# Patient Record
Sex: MALE | Race: WHITE | NOT HISPANIC OR LATINO | Employment: FULL TIME | URBAN - METROPOLITAN AREA
[De-identification: names, ages, dates, MRNs, and addresses within clinical notes are randomized per-mention and may not be internally consistent; named-entity substitution may affect disease eponyms.]

---

## 2017-01-09 RX ORDER — CELECOXIB 200 MG/1
CAPSULE ORAL
Qty: 30 CAPSULE | Refills: 3 | Status: SHIPPED | OUTPATIENT
Start: 2017-01-09 | End: 2017-05-22 | Stop reason: SDUPTHER

## 2017-01-23 RX ORDER — OMEPRAZOLE 20 MG/1
CAPSULE, DELAYED RELEASE ORAL
Qty: 30 CAPSULE | Refills: 5 | Status: SHIPPED | OUTPATIENT
Start: 2017-01-23 | End: 2017-07-23 | Stop reason: SDUPTHER

## 2017-02-15 ENCOUNTER — PATIENT MESSAGE (OUTPATIENT)
Dept: FAMILY MEDICINE | Facility: CLINIC | Age: 42
End: 2017-02-15

## 2017-02-16 ENCOUNTER — PATIENT MESSAGE (OUTPATIENT)
Dept: FAMILY MEDICINE | Facility: CLINIC | Age: 42
End: 2017-02-16

## 2017-02-16 RX ORDER — MUPIROCIN 20 MG/G
OINTMENT TOPICAL 3 TIMES DAILY
Qty: 39 G | Refills: 2 | Status: SHIPPED | OUTPATIENT
Start: 2017-02-16 | End: 2017-02-26

## 2017-02-22 RX ORDER — LOSARTAN POTASSIUM 100 MG/1
TABLET ORAL
Qty: 30 TABLET | Refills: 2 | Status: SHIPPED | OUTPATIENT
Start: 2017-02-22 | End: 2017-05-22 | Stop reason: SDUPTHER

## 2017-05-23 RX ORDER — CELECOXIB 200 MG/1
CAPSULE ORAL
Qty: 30 CAPSULE | Refills: 3 | Status: SHIPPED | OUTPATIENT
Start: 2017-05-23 | End: 2017-09-10 | Stop reason: SDUPTHER

## 2017-05-23 RX ORDER — LOSARTAN POTASSIUM 100 MG/1
TABLET ORAL
Qty: 30 TABLET | Refills: 2 | Status: SHIPPED | OUTPATIENT
Start: 2017-05-23 | End: 2017-08-30 | Stop reason: SDUPTHER

## 2017-07-23 RX ORDER — OMEPRAZOLE 20 MG/1
CAPSULE, DELAYED RELEASE ORAL
Qty: 30 CAPSULE | Refills: 5 | Status: SHIPPED | OUTPATIENT
Start: 2017-07-23 | End: 2017-10-02 | Stop reason: SDUPTHER

## 2017-08-30 RX ORDER — LOSARTAN POTASSIUM 100 MG/1
TABLET ORAL
Qty: 30 TABLET | Refills: 2 | Status: SHIPPED | OUTPATIENT
Start: 2017-08-30 | End: 2017-11-29 | Stop reason: SDUPTHER

## 2017-09-10 RX ORDER — CELECOXIB 200 MG/1
CAPSULE ORAL
Qty: 30 CAPSULE | Refills: 3 | Status: SHIPPED | OUTPATIENT
Start: 2017-09-10 | End: 2017-10-02 | Stop reason: SDUPTHER

## 2017-09-30 DIAGNOSIS — M51.9 LUMBAR DISC DISEASE: Primary | ICD-10-CM

## 2017-10-02 RX ORDER — MUPIROCIN 20 MG/G
OINTMENT TOPICAL
Qty: 44 G | Refills: 1 | Status: SHIPPED | OUTPATIENT
Start: 2017-10-02 | End: 2019-06-13 | Stop reason: SDUPTHER

## 2017-10-02 RX ORDER — CELECOXIB 200 MG/1
CAPSULE ORAL
Qty: 30 CAPSULE | Refills: 3 | Status: SHIPPED | OUTPATIENT
Start: 2017-10-02 | End: 2017-12-14 | Stop reason: SDUPTHER

## 2017-10-02 NOTE — PROGRESS NOTES
Refill Authorization Note     is requesting a refill authorization.    Brief assessment and rational for refill: DENY; recently sent in / needs fu  Name of medication: CELECOXIB 200 MG CAPSULE / mupirocin ointment   How patient will take medication: t1t po BID / apply to affected area TID  Amount/Quantity of medication ordered: 30d           Refills Authorized: No  If authorized number of refills: 0  If refills not authorized provide recommendations: Needs f/u  Medication-related problems identified: Therapeutic duplication  Medication Therapy Plan: DENY Celecoxib recently sent in for 90d on 9/10.  Will order CMP for updated labs.  Needs to be scheduled. DENY mupirocin: pt reported past use for heat rash in past.  Should f/u if pt still needs relief  Comments: Dc'd duplicate meds    Lab Results   Component Value Date    CREATININE 1.1 11/06/2013    BUN 9 11/06/2013     11/06/2013    K 3.9 11/06/2013     11/06/2013    CO2 28 11/06/2013      BP Readings from Last 3 Encounters:   09/30/16 138/88   02/16/16 114/70   04/14/15 (!) 142/82

## 2017-10-02 NOTE — PROGRESS NOTES
Refill Authorization Note     is requesting a refill authorization.    Brief assessment and rational for refill: APPROVE: prr  Name of medication: omeprazole (PRILOSEC) 20 MG capsule  How patient will take medication: t1t po daily   Amount/Quantity of medication ordered: 90d            Refills Authorized: Yes  If authorized number of refills: 0        Medication Therapy Plan: Last commented as stable on 9/16.  Consider weaning conversation with pt.  Approve for 90d   Comments:   Lab Results   Component Value Date    WBC 12.55 11/06/2013    HGB 15.3 11/06/2013    HCT 45.2 11/06/2013    MCV 92 11/06/2013     (H) 11/06/2013

## 2017-10-03 RX ORDER — OMEPRAZOLE 20 MG/1
20 CAPSULE, DELAYED RELEASE ORAL DAILY
Qty: 90 CAPSULE | Refills: 0 | Status: SHIPPED | OUTPATIENT
Start: 2017-10-03 | End: 2018-01-22 | Stop reason: SDUPTHER

## 2017-10-12 ENCOUNTER — PATIENT MESSAGE (OUTPATIENT)
Dept: FAMILY MEDICINE | Facility: CLINIC | Age: 42
End: 2017-10-12

## 2017-10-13 ENCOUNTER — PATIENT MESSAGE (OUTPATIENT)
Dept: FAMILY MEDICINE | Facility: CLINIC | Age: 42
End: 2017-10-13

## 2017-10-13 ENCOUNTER — LAB VISIT (OUTPATIENT)
Dept: LAB | Facility: HOSPITAL | Age: 42
End: 2017-10-13
Attending: FAMILY MEDICINE
Payer: COMMERCIAL

## 2017-10-13 DIAGNOSIS — M51.9 LUMBAR DISC DISEASE: ICD-10-CM

## 2017-10-13 LAB
ALBUMIN SERPL BCP-MCNC: 3.7 G/DL
ALP SERPL-CCNC: 67 U/L
ALT SERPL W/O P-5'-P-CCNC: 38 U/L
ANION GAP SERPL CALC-SCNC: 8 MMOL/L
AST SERPL-CCNC: 26 U/L
BILIRUB SERPL-MCNC: 0.7 MG/DL
BUN SERPL-MCNC: 15 MG/DL
CALCIUM SERPL-MCNC: 9.5 MG/DL
CHLORIDE SERPL-SCNC: 107 MMOL/L
CO2 SERPL-SCNC: 26 MMOL/L
CREAT SERPL-MCNC: 1.1 MG/DL
EST. GFR  (AFRICAN AMERICAN): >60 ML/MIN/1.73 M^2
EST. GFR  (NON AFRICAN AMERICAN): >60 ML/MIN/1.73 M^2
GLUCOSE SERPL-MCNC: 134 MG/DL
POTASSIUM SERPL-SCNC: 4.7 MMOL/L
PROT SERPL-MCNC: 7.8 G/DL
SODIUM SERPL-SCNC: 141 MMOL/L

## 2017-10-13 PROCEDURE — 36415 COLL VENOUS BLD VENIPUNCTURE: CPT | Mod: PO

## 2017-10-13 PROCEDURE — 80053 COMPREHEN METABOLIC PANEL: CPT

## 2017-11-14 ENCOUNTER — PATIENT OUTREACH (OUTPATIENT)
Dept: ADMINISTRATIVE | Facility: HOSPITAL | Age: 42
End: 2017-11-14

## 2017-11-29 ENCOUNTER — OFFICE VISIT (OUTPATIENT)
Dept: FAMILY MEDICINE | Facility: CLINIC | Age: 42
End: 2017-11-29
Payer: COMMERCIAL

## 2017-11-29 ENCOUNTER — PATIENT MESSAGE (OUTPATIENT)
Dept: FAMILY MEDICINE | Facility: CLINIC | Age: 42
End: 2017-11-29

## 2017-11-29 VITALS
BODY MASS INDEX: 36.93 KG/M2 | WEIGHT: 278.69 LBS | DIASTOLIC BLOOD PRESSURE: 84 MMHG | HEIGHT: 73 IN | TEMPERATURE: 99 F | HEART RATE: 72 BPM | SYSTOLIC BLOOD PRESSURE: 134 MMHG

## 2017-11-29 DIAGNOSIS — I10 ESSENTIAL HYPERTENSION: Primary | ICD-10-CM

## 2017-11-29 DIAGNOSIS — M51.9 LUMBAR DISC DISEASE: ICD-10-CM

## 2017-11-29 DIAGNOSIS — K21.9 GASTROESOPHAGEAL REFLUX DISEASE, ESOPHAGITIS PRESENCE NOT SPECIFIED: ICD-10-CM

## 2017-11-29 DIAGNOSIS — Z23 NEED FOR PNEUMOCOCCAL VACCINATION: ICD-10-CM

## 2017-11-29 PROCEDURE — 90472 IMMUNIZATION ADMIN EACH ADD: CPT | Mod: S$GLB,,, | Performed by: FAMILY MEDICINE

## 2017-11-29 PROCEDURE — 90670 PCV13 VACCINE IM: CPT | Mod: S$GLB,,, | Performed by: FAMILY MEDICINE

## 2017-11-29 PROCEDURE — 90471 IMMUNIZATION ADMIN: CPT | Mod: S$GLB,,, | Performed by: FAMILY MEDICINE

## 2017-11-29 PROCEDURE — 90686 IIV4 VACC NO PRSV 0.5 ML IM: CPT | Mod: S$GLB,,, | Performed by: FAMILY MEDICINE

## 2017-11-29 PROCEDURE — 99214 OFFICE O/P EST MOD 30 MIN: CPT | Mod: 25,S$GLB,, | Performed by: FAMILY MEDICINE

## 2017-11-29 PROCEDURE — 99999 PR PBB SHADOW E&M-EST. PATIENT-LVL III: CPT | Mod: PBBFAC,,, | Performed by: FAMILY MEDICINE

## 2017-11-29 RX ORDER — LOSARTAN POTASSIUM 100 MG/1
100 TABLET ORAL DAILY
Qty: 90 TABLET | Refills: 3 | Status: SHIPPED | OUTPATIENT
Start: 2017-11-29 | End: 2018-12-04 | Stop reason: SDUPTHER

## 2017-11-29 RX ORDER — CLOTRIMAZOLE AND BETAMETHASONE DIPROPIONATE 10; .64 MG/G; MG/G
CREAM TOPICAL 2 TIMES DAILY
Qty: 60 G | Refills: 2 | Status: SHIPPED | OUTPATIENT
Start: 2017-11-29 | End: 2019-02-15 | Stop reason: SDUPTHER

## 2017-11-29 NOTE — PROGRESS NOTES
Subjective:     THIS DOCUMENT WAS MADE IN PART WITH [employer] DICTATION SOFTWARE. OCCASIONALLY THIS SOFTWARE MAY MISINTERPRET WORDS OR PHRASES.     Patient ID: Jose Boggs is a 41 y.o. male.    Chief Complaint: Follow-up (labs---)    HPI    follow up several chronic conditions. Everything appears stable, no acute concerns or recent exacerbations, with exception of a rash.     Hypertension, chronic condition, this is satisfactory and stable.    GERD, controlled,  With current medications. Although if he does miss a day or two symptoms to return. 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 This seems to have helped improved his back but there is still pain    'heat rash', rash inner thighs. He has been applying Bactroban but this has not been helping.    Active Ambulatory Problems     Diagnosis Date Noted    Hypertension 12/14/2012    GERD (gastroesophageal reflux disease) 12/14/2012    Lumbar disc disease 12/09/2013    Myalgia and myositis 12/09/2013     Resolved Ambulatory Problems     Diagnosis Date Noted    No Resolved Ambulatory Problems     Past Medical History:   Diagnosis Date    GERD (gastroesophageal reflux disease)     Hypertension     Low back pain      Current Outpatient Prescriptions on File Prior to Visit   Medication Sig Dispense Refill    celecoxib (CELEBREX) 200 MG capsule TAKE 1 CAPSULE BY MOUTH TWICE A DAY 30 capsule 3    mupirocin (BACTROBAN) 2 % ointment APPLY TO AFFECTED AREA 3 TIMES A DAY 44 g 1    omeprazole (PRILOSEC) 20 MG capsule Take 1 capsule (20 mg total) by mouth once daily. 90 capsule 0     No current facility-administered medications on file prior to visit.    '  Review of patient's allergies indicates:   Allergen Reactions    Slo-niacin  [niacin]      Other reaction(s): Flushing (skin)     Social History   Substance Use Topics    Smoking status: Never Smoker    Smokeless tobacco: Never Used     Alcohol use Not on file     History reviewed. No pertinent family history.      Review of Systems   Constitutional: Negative for activity change and unexpected weight change.   HENT: Negative for hearing loss, rhinorrhea and trouble swallowing.    Eyes: Negative for discharge and visual disturbance.   Respiratory: Negative for chest tightness and wheezing.    Cardiovascular: Negative for chest pain and palpitations.   Gastrointestinal: Negative for blood in stool, constipation, diarrhea and vomiting.   Endocrine: Negative for polydipsia and polyuria.   Genitourinary: Negative for difficulty urinating, hematuria and urgency.   Musculoskeletal: Negative for arthralgias, joint swelling and neck pain.   Neurological: Negative for weakness and headaches.   Psychiatric/Behavioral: Negative for confusion and dysphoric mood.       Objective:      Physical Exam   Constitutional: He is oriented to person, place, and time. He appears well-developed and well-nourished. No distress.   HENT:   Head: Normocephalic and atraumatic.   Eyes: No scleral icterus.   Neck: Normal range of motion. Neck supple.   Cardiovascular: Normal rate, regular rhythm and normal heart sounds.  Exam reveals no gallop.    No murmur heard.  Pulmonary/Chest: Effort normal. No respiratory distress.   Neurological: He is alert and oriented to person, place, and time. He has normal reflexes.   Skin: Skin is warm and dry. He is not diaphoretic.   Psychiatric: He has a normal mood and affect. His behavior is normal.   Vitals reviewed.      Assessment:       1. Essential hypertension    2. Gastroesophageal reflux disease, esophagitis presence not specified    3. Lumbar disc disease    4. Need for pneumococcal vaccination        Plan:       Jose was seen today for follow-up.    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  -     clotrimazole-betamethasone 1-0.05% (LOTRISONE) cream; Apply topically 2 (two) times daily.  -     naltrexone-bupropion 8-90 mg TbSR; Take 2 tablets by mouth 2 (two) times daily. If this is the first month then follow  or physicians specific instructions.  -     Influenza - Quadrivalent (3 years & older) (PF)    '     H/o pneumonia, had 23    Discussed Contrave, his wife has had good success, he feels he needs some additional help with appetite control and weight loss. After a long discussion about risk and long-term success in maintaining weight he still would wish to try Contrave.  Instructions given, following two months

## 2017-12-08 ENCOUNTER — TELEPHONE (OUTPATIENT)
Dept: FAMILY MEDICINE | Facility: CLINIC | Age: 42
End: 2017-12-08

## 2017-12-08 RX ORDER — OSELTAMIVIR PHOSPHATE 75 MG/1
75 CAPSULE ORAL DAILY
Qty: 10 CAPSULE | Refills: 0 | Status: SHIPPED | OUTPATIENT
Start: 2017-12-08 | End: 2017-12-18

## 2017-12-13 ENCOUNTER — PATIENT MESSAGE (OUTPATIENT)
Dept: FAMILY MEDICINE | Facility: CLINIC | Age: 42
End: 2017-12-13

## 2017-12-15 ENCOUNTER — PATIENT MESSAGE (OUTPATIENT)
Dept: FAMILY MEDICINE | Facility: CLINIC | Age: 42
End: 2017-12-15

## 2017-12-15 RX ORDER — CELECOXIB 200 MG/1
200 CAPSULE ORAL DAILY
Qty: 90 CAPSULE | Refills: 1 | Status: SHIPPED | OUTPATIENT
Start: 2017-12-15 | End: 2018-02-27 | Stop reason: SDUPTHER

## 2017-12-15 NOTE — TELEPHONE ENCOUNTER
Refill Authorization Note     is requesting a refill authorization.    Brief assessment and rationale for refill: APPROVE: prr  Amount/Quantity of medication ordered: 90d         Refills Authorized: Yes  If authorized number of refills: 2           Medication Therapy Plan: Kidney labs WNL: approve 9 more mo.  Name and strength of medication: celecoxib 200 mg  How patient will take medication: t1t po daily   Medication reconciliation completed: No  Comments:   Lab Results   Component Value Date    CREATININE 1.1 10/13/2017    BUN 15 10/13/2017     10/13/2017    K 4.7 10/13/2017     10/13/2017    CO2 26 10/13/2017      BP Readings from Last 3 Encounters:   11/29/17 134/84   09/30/16 138/88   02/16/16 114/70

## 2017-12-19 ENCOUNTER — TELEPHONE (OUTPATIENT)
Dept: FAMILY MEDICINE | Facility: CLINIC | Age: 42
End: 2017-12-19

## 2017-12-19 NOTE — TELEPHONE ENCOUNTER
PA request rec'd from [facility] ([id number]) for celecoxib.  Phone: [phone]    PA Initiated via [email or web address]  Key: [id number]    Awaiting response.

## 2017-12-28 ENCOUNTER — TELEPHONE (OUTPATIENT)
Dept: FAMILY MEDICINE | Facility: CLINIC | Age: 42
End: 2017-12-28

## 2017-12-28 NOTE — TELEPHONE ENCOUNTER
----- Message from Anai Caban sent at 12/28/2017 10:11 AM CST -----  Contact: [facility]  Calling to check on PA status. On Rx celecoxib (CELEBREX) 200 MG   [id number] - [place] - [address] [facility]  [address]  [place] [address]  Phone: [phone] Fax: [phone]

## 2017-12-28 NOTE — TELEPHONE ENCOUNTER
Spoke with [facility] and they stated that they would send PA request via fax.  Awaiting fax and will begin Pa.      PA submitted   KEY: [id number]

## 2018-01-15 ENCOUNTER — PATIENT OUTREACH (OUTPATIENT)
Dept: ADMINISTRATIVE | Facility: HOSPITAL | Age: 43
End: 2018-01-15

## 2018-01-23 RX ORDER — OMEPRAZOLE 20 MG/1
20 CAPSULE, DELAYED RELEASE ORAL DAILY
Qty: 90 CAPSULE | Refills: 1 | Status: SHIPPED | OUTPATIENT
Start: 2018-01-23 | End: 2018-07-25 | Stop reason: SDUPTHER

## 2018-01-23 NOTE — PROGRESS NOTES
Refill Authorization Note     is requesting a refill authorization.    Brief assessment and rationale for refill: APPROVE: prr  Amount/Quantity of medication ordered: 90d         Refills Authorized: Yes  If authorized number of refills: 1           Medication Therapy Plan: GERD commented as stable; approve for 6 more mo   Name and strength of medication: OMEPRAZOLE DR 20 MG CAPSULE  How patient will take medication: t1t po daily   Medication reconciliation completed: No  Comments:   Lab Results   Component Value Date    WBC 12.55 11/06/2013    HGB 15.3 11/06/2013    HCT 45.2 11/06/2013    MCV 92 11/06/2013     (H) 11/06/2013

## 2018-02-25 DIAGNOSIS — M51.9 LUMBAR DISC DISEASE: Primary | ICD-10-CM

## 2018-02-27 RX ORDER — CELECOXIB 200 MG/1
200 CAPSULE ORAL DAILY
Qty: 90 CAPSULE | Refills: 0 | Status: SHIPPED | OUTPATIENT
Start: 2018-02-27 | End: 2018-06-19 | Stop reason: SDUPTHER

## 2018-02-27 NOTE — PROGRESS NOTES
Refill Authorization Note     is requesting a refill authorization.    Brief assessment and rationale for refill: DEFER; looks like dosage was adjusted; needs CBC  Amount/Quantity of medication ordered: 90d         Refills Authorized: Yes  If authorized number of refills: 0        Medication-related problems identified:   Requires labs  Dose adjustment  Medication Therapy Plan: Looks like pt had been receiving sig for BID but last prescription was changed to ONCE daily; per pt chart no other records of dose being changed.  Will adjust to once daily per last Rx and defer to you.  Needs to update CBC   Name and strength of medication: CELECOXIB 200 MG CAPSULE  How patient will take medication: t1t po daily   Medication reconciliation completed: No  Comments:   Lab Results   Component Value Date    CREATININE 1.1 10/13/2017    BUN 15 10/13/2017     10/13/2017    K 4.7 10/13/2017     10/13/2017    CO2 26 10/13/2017      BP Readings from Last 3 Encounters:   11/29/17 134/84   09/30/16 138/88   02/16/16 114/70      Lab Results   Component Value Date    WBC 12.55 11/06/2013    HGB 15.3 11/06/2013    HCT 45.2 11/06/2013    MCV 92 11/06/2013     (H) 11/06/2013

## 2018-02-28 RX ORDER — CELECOXIB 200 MG/1
CAPSULE ORAL
Qty: 30 CAPSULE | Refills: 3 | Status: SHIPPED | OUTPATIENT
Start: 2018-02-28 | End: 2018-07-09

## 2018-02-28 NOTE — TELEPHONE ENCOUNTER
Spoke with pt and informed him that he needs to have CBC lab done. Pt will go 03/03/2018 to [facility] to have labs done.     Please remove medication as it was already sent in on 02/27/2018. Not sure why its showing up again.     Thank you.

## 2018-02-28 NOTE — TELEPHONE ENCOUNTER
----- Message from Trudi Villarreal sent at 2/28/2018 10:02 AM CST -----  Contact: self  Patient is returning Sara's call.  Please call patient at [phone].  Thanks!

## 2018-02-28 NOTE — TELEPHONE ENCOUNTER
Attempted to contact pt to inform him that he is due for lab (CBC) and needs to schedule.     No answer; left message to return call to schedule, also sent pt message on [facility] portal.

## 2018-03-15 ENCOUNTER — PATIENT MESSAGE (OUTPATIENT)
Dept: FAMILY MEDICINE | Facility: CLINIC | Age: 43
End: 2018-03-15

## 2018-03-16 ENCOUNTER — LAB VISIT (OUTPATIENT)
Dept: LAB | Facility: HOSPITAL | Age: 43
End: 2018-03-16
Attending: FAMILY MEDICINE
Payer: MEDICAID

## 2018-03-16 DIAGNOSIS — M51.9 LUMBAR DISC DISEASE: ICD-10-CM

## 2018-03-16 LAB
ERYTHROCYTE [DISTWIDTH] IN BLOOD BY AUTOMATED COUNT: 12.4 %
HCT VFR BLD AUTO: 45.7 %
HGB BLD-MCNC: 15.5 G/DL
MCH RBC QN AUTO: 31.1 PG
MCHC RBC AUTO-ENTMCNC: 33.9 G/DL
MCV RBC AUTO: 92 FL
PLATELET # BLD AUTO: 353 K/UL
PMV BLD AUTO: 10.5 FL
RBC # BLD AUTO: 4.99 M/UL
WBC # BLD AUTO: 8.22 K/UL

## 2018-03-16 PROCEDURE — 36415 COLL VENOUS BLD VENIPUNCTURE: CPT | Mod: PO

## 2018-03-16 PROCEDURE — 85027 COMPLETE CBC AUTOMATED: CPT

## 2018-06-19 ENCOUNTER — PATIENT MESSAGE (OUTPATIENT)
Dept: FAMILY MEDICINE | Facility: CLINIC | Age: 43
End: 2018-06-19

## 2018-06-19 DIAGNOSIS — M51.9 LUMBAR DISC DISEASE: ICD-10-CM

## 2018-06-19 RX ORDER — CELECOXIB 200 MG/1
200 CAPSULE ORAL DAILY
Qty: 90 CAPSULE | Refills: 0 | Status: SHIPPED | OUTPATIENT
Start: 2018-06-19 | End: 2018-07-09

## 2018-06-27 ENCOUNTER — PATIENT MESSAGE (OUTPATIENT)
Dept: FAMILY MEDICINE | Facility: CLINIC | Age: 43
End: 2018-06-27

## 2018-07-09 ENCOUNTER — PATIENT MESSAGE (OUTPATIENT)
Dept: FAMILY MEDICINE | Facility: CLINIC | Age: 43
End: 2018-07-09

## 2018-07-09 RX ORDER — MELOXICAM 15 MG/1
15 TABLET ORAL DAILY
Qty: 30 TABLET | Refills: 1 | Status: SHIPPED | OUTPATIENT
Start: 2018-07-09 | End: 2018-08-13

## 2018-07-09 NOTE — TELEPHONE ENCOUNTER
Try meloxicam 15 mg. Similar type of medication. Tell him not to be concerned about 15 mg versus 200, you can't compare milligram from one product to the next. The effective dosage is equivalent.     Watch for any side effects or gastrointestinal complications. All Nsaids must be taken with caution. If it doesn't work maybe we can use this as information to attempt a repeat prior authorization.

## 2018-07-09 NOTE — TELEPHONE ENCOUNTER
----- Message from Flores Mitchell sent at 7/9/2018  2:30 PM CDT -----  Contact: self [phone]  Please call him regarding the authorization for the celebrex.  His insurance denied the authorization and wants him to try something else. Thank you!

## 2018-07-09 NOTE — TELEPHONE ENCOUNTER
----- Message from Halley Ortiz sent at 7/9/2018 11:50 AM CDT -----  Contact: self   Patient need to speak with a nurse Paulette regarding a authorization on his prescription please call back at [phone]

## 2018-07-25 NOTE — PROGRESS NOTES
Refill Authorization Note     is requesting a refill authorization.    Brief assessment and rationale for refill: APPROVE; prr  Amount/Quantity of medication ordered: 90d  Date of last appointment: 11/29/2017     Refills Authorized: Yes  If authorized number of refills: 0           Medication Therapy Plan: GERD- commented as stable at lov; Labs-wnl; Approve 3 more months   Name and strength of medication: omeprazole (PRILOSEC) 20 MG capsule  How patient will take medication: t1c po qd  Medication reconciliation completed: Yes  Comments:   Lab Results   Component Value Date    WBC 8.22 03/16/2018    HGB 15.5 03/16/2018    HCT 45.7 03/16/2018    MCV 92 03/16/2018     (H) 03/16/2018       No results found for: MG     No results found for: [id number]

## 2018-07-26 RX ORDER — OMEPRAZOLE 20 MG/1
20 CAPSULE, DELAYED RELEASE ORAL DAILY
Qty: 90 CAPSULE | Refills: 0 | Status: SHIPPED | OUTPATIENT
Start: 2018-07-26 | End: 2018-10-24 | Stop reason: SDUPTHER

## 2018-08-12 ENCOUNTER — PATIENT MESSAGE (OUTPATIENT)
Dept: FAMILY MEDICINE | Facility: CLINIC | Age: 43
End: 2018-08-12

## 2018-08-12 DIAGNOSIS — M51.9 LUMBAR DISC DISEASE: ICD-10-CM

## 2018-08-13 RX ORDER — CELECOXIB 200 MG/1
200 CAPSULE ORAL DAILY
Qty: 90 CAPSULE | Refills: 0 | Status: SHIPPED | OUTPATIENT
Start: 2018-08-13 | End: 2019-04-08 | Stop reason: SDUPTHER

## 2018-08-13 NOTE — TELEPHONE ENCOUNTER
See my chart message. I sent in a for Celebrex and anticipate PA required. Note that recently tried meloxicam less effective with upset stomach. Over-the-counter insets like Advil and naproxen also upset stomach. Previously did well in Celebrex without any adverse effects.

## 2018-08-16 ENCOUNTER — PATIENT MESSAGE (OUTPATIENT)
Dept: FAMILY MEDICINE | Facility: CLINIC | Age: 43
End: 2018-08-16

## 2018-08-16 ENCOUNTER — TELEPHONE (OUTPATIENT)
Dept: FAMILY MEDICINE | Facility: CLINIC | Age: 43
End: 2018-08-16

## 2018-08-16 NOTE — TELEPHONE ENCOUNTER
PA request rec'd from [facility] ([id number]) for Celecoxib.  Phone: [phone]  Pt Id: [id number]    PA Initiated via [email or web address]  Key: [id number]    Awaiting response.

## 2018-08-20 NOTE — TELEPHONE ENCOUNTER
rec'd fax from [facility]. Rx approved from 8-16-18 to 8-16-19. Info faxed to pharmacy.

## 2018-09-06 NOTE — TELEPHONE ENCOUNTER
Received refill request for meloxicam though I thought we switched back to Celebrex.  Please verify before I deny or approve

## 2018-09-07 RX ORDER — MELOXICAM 15 MG/1
TABLET ORAL
Qty: 30 TABLET | Refills: 1 | OUTPATIENT
Start: 2018-09-07

## 2018-10-11 DIAGNOSIS — M51.9 LUMBAR DISC DISEASE: ICD-10-CM

## 2018-10-11 RX ORDER — CELECOXIB 200 MG/1
200 CAPSULE ORAL DAILY
Qty: 90 CAPSULE | Refills: 0 | Status: SHIPPED | OUTPATIENT
Start: 2018-10-11 | End: 2019-01-08 | Stop reason: SDUPTHER

## 2018-10-24 RX ORDER — OMEPRAZOLE 20 MG/1
CAPSULE, DELAYED RELEASE ORAL
Qty: 90 CAPSULE | Refills: 0 | Status: SHIPPED | OUTPATIENT
Start: 2018-10-24 | End: 2020-02-11

## 2018-10-29 RX ORDER — OMEPRAZOLE 20 MG/1
CAPSULE, DELAYED RELEASE ORAL
Qty: 90 CAPSULE | Refills: 0 | Status: SHIPPED | OUTPATIENT
Start: 2018-10-29 | End: 2019-01-08 | Stop reason: SDUPTHER

## 2018-11-03 RX ORDER — OMEPRAZOLE 20 MG/1
CAPSULE, DELAYED RELEASE ORAL
Qty: 90 CAPSULE | Refills: 0 | Status: SHIPPED | OUTPATIENT
Start: 2018-11-03 | End: 2019-01-08 | Stop reason: SDUPTHER

## 2018-12-04 RX ORDER — LOSARTAN POTASSIUM 100 MG/1
100 TABLET ORAL DAILY
Qty: 90 TABLET | Refills: 0 | Status: SHIPPED | OUTPATIENT
Start: 2018-12-04 | End: 2019-02-26 | Stop reason: SDUPTHER

## 2019-01-08 ENCOUNTER — OFFICE VISIT (OUTPATIENT)
Dept: FAMILY MEDICINE | Facility: CLINIC | Age: 44
End: 2019-01-08
Payer: MEDICAID

## 2019-01-08 VITALS
TEMPERATURE: 98 F | DIASTOLIC BLOOD PRESSURE: 88 MMHG | HEIGHT: 73 IN | BODY MASS INDEX: 36.41 KG/M2 | HEART RATE: 84 BPM | RESPIRATION RATE: 18 BRPM | SYSTOLIC BLOOD PRESSURE: 130 MMHG | WEIGHT: 274.69 LBS

## 2019-01-08 DIAGNOSIS — K21.9 GASTROESOPHAGEAL REFLUX DISEASE, ESOPHAGITIS PRESENCE NOT SPECIFIED: ICD-10-CM

## 2019-01-08 DIAGNOSIS — R35.0 URINARY FREQUENCY: ICD-10-CM

## 2019-01-08 DIAGNOSIS — I10 ESSENTIAL HYPERTENSION: ICD-10-CM

## 2019-01-08 DIAGNOSIS — M51.9 LUMBAR DISC DISEASE: ICD-10-CM

## 2019-01-08 DIAGNOSIS — Z00.00 WELLNESS EXAMINATION: Primary | ICD-10-CM

## 2019-01-08 PROCEDURE — 99396 PR PREVENTIVE VISIT,EST,40-64: ICD-10-PCS | Mod: S$PBB,,, | Performed by: FAMILY MEDICINE

## 2019-01-08 PROCEDURE — 99999 PR PBB SHADOW E&M-EST. PATIENT-LVL IV: ICD-10-PCS | Mod: PBBFAC,,, | Performed by: FAMILY MEDICINE

## 2019-01-08 PROCEDURE — 99214 OFFICE O/P EST MOD 30 MIN: CPT | Mod: PBBFAC,PN | Performed by: FAMILY MEDICINE

## 2019-01-08 PROCEDURE — 99396 PREV VISIT EST AGE 40-64: CPT | Mod: S$PBB,,, | Performed by: FAMILY MEDICINE

## 2019-01-08 PROCEDURE — 99999 PR PBB SHADOW E&M-EST. PATIENT-LVL IV: CPT | Mod: PBBFAC,,, | Performed by: FAMILY MEDICINE

## 2019-01-08 NOTE — PROGRESS NOTES
THIS DOCUMENT WAS MADE IN PART WITH VOICE RECOGNITION SOFTWARE.  OCCASIONALLY THIS SOFTWARE WILL MISINTERPRET WORDS OR PHRASES.      Jose Boggs  1975    Jose was seen today for annual exam and urinary frequency.    Diagnoses and all orders for this visit:    Wellness examination  -     Comprehensive metabolic panel; Future  -     Lipid panel; Future  -     CBC auto differential; Future    Urinary frequency  -     Ambulatory referral to Urology  He asked about hytrin, Though if he does not have significant BPH I don't know that this will help. Since symptoms began at a young age need to rule out congenital outflow instruction. Could consider anti-cholinergic but this should not be started without ruling out obstruction first therefore I do recommend proceeding with urology consultation    Essential hypertension  This chronic condition is currently stable.    Gastroesophageal reflux disease, esophagitis presence not specified  This chronic condition is currently stable.    Lumbar disc disease  Improved now that not driving  Working in [employer]         Subjective     Chief Complaint   Patient presents with    Annual Exam    Urinary Frequency      x years        HPI:      Urinary freq, 'all my life', and many in family  Father on hytrin  Worse with coffee, also at night      HPI elements addressed above in the assessment and plan including problems, diagnosis, stability/instability,  improving/worsening, and chronicity will not be duplicated in this section. Any important additional HPI topics will be discussed here if needed.      Active Ambulatory Problems     Diagnosis Date Noted    Hypertension 12/14/2012    GERD (gastroesophageal reflux disease) 12/14/2012    Lumbar disc disease 12/09/2013    Myalgia and myositis 12/09/2013     Resolved Ambulatory Problems     Diagnosis Date Noted    No Resolved Ambulatory Problems     Past Medical History:   Diagnosis Date    GERD (gastroesophageal  reflux disease)     Hypertension     Low back pain        Current Outpatient Medications on File Prior to Visit   Medication Sig Dispense Refill    celecoxib (CELEBREX) 200 MG capsule Take 1 capsule (200 mg total) by mouth once daily. 90 capsule 0    clotrimazole-betamethasone 1-0.05% (LOTRISONE) cream Apply topically 2 (two) times daily. 60 g 2    losartan (COZAAR) 100 MG tablet Take 1 tablet (100 mg total) by mouth once daily. 90 tablet 0    mupirocin (BACTROBAN) 2 % ointment APPLY TO AFFECTED AREA 3 TIMES A DAY 44 g 1    omeprazole (PRILOSEC) 20 MG capsule TAKE 1 CAPSULE BY MOUTH EVERY DAY 90 capsule 0    [DISCONTINUED] celecoxib (CELEBREX) 200 MG capsule TAKE 1 CAPSULE (200 MG TOTAL) BY MOUTH ONCE DAILY. 90 capsule 0    [DISCONTINUED] naltrexone-bupropion 8-90 mg TbSR Take 2 tablets by mouth 2 (two) times daily. If this is the first month then follow  or physicians specific instructions. 120 tablet 5    [DISCONTINUED] omeprazole (PRILOSEC) 20 MG capsule TAKE 1 CAPSULE BY MOUTH EVERY DAY 90 capsule 0    [DISCONTINUED] omeprazole (PRILOSEC) 20 MG capsule TAKE 1 CAPSULE BY MOUTH EVERY DAY 90 capsule 0     No current facility-administered medications on file prior to visit.        Review of patient's allergies indicates:   Allergen Reactions    Slo-niacin  [niacin]      Other reaction(s): Flushing (skin)       History reviewed. No pertinent family history.    Social History     Tobacco Use    Smoking status: Never Smoker    Smokeless tobacco: Never Used   Substance Use Topics    Alcohol use: Not on file    Drug use: Not on file         Review of Systems   Constitutional: Negative for activity change and unexpected weight change.   HENT: Negative for hearing loss, rhinorrhea and trouble swallowing.    Eyes: Negative for discharge and visual disturbance.   Respiratory: Negative for chest tightness and wheezing.    Cardiovascular: Negative for chest pain and palpitations.   Gastrointestinal:  Negative for blood in stool, constipation, diarrhea and vomiting.   Endocrine: Positive for polyuria. Negative for polydipsia.   Genitourinary: Positive for urgency. Negative for difficulty urinating and hematuria.   Musculoskeletal: Negative for arthralgias, joint swelling and neck pain.   Neurological: Negative for weakness and headaches.   Psychiatric/Behavioral: Negative for confusion and dysphoric mood.       Objective     Physical Exam   Constitutional: He is oriented to person, place, and time. He appears well-developed and well-nourished. No distress.   HENT:   Head: Normocephalic and atraumatic.   Nose: Nose normal.   Mouth/Throat: Oropharynx is clear and moist. No oropharyngeal exudate.   bilat cerumen   Eyes: Conjunctivae and EOM are normal. Pupils are equal, round, and reactive to light. Right eye exhibits no discharge. Left eye exhibits no discharge. No scleral icterus.   Neck: Normal range of motion. Neck supple. No JVD present. No tracheal deviation present. No thyromegaly present.   Cardiovascular: Normal rate, regular rhythm, normal heart sounds and intact distal pulses. Exam reveals no gallop and no friction rub.   No murmur heard.  Pulmonary/Chest: Effort normal and breath sounds normal. No stridor. No respiratory distress. He has no wheezes. He has no rales. He exhibits no tenderness.   Abdominal: Soft. Bowel sounds are normal. He exhibits no distension and no mass. There is no tenderness. There is no rebound and no guarding.   Lymphadenopathy:     He has no cervical adenopathy.   Neurological: He is alert and oriented to person, place, and time. He has normal reflexes. He displays normal reflexes. No cranial nerve deficit. He exhibits normal muscle tone. Coordination normal.   Skin: Skin is warm. No rash noted. He is not diaphoretic.   Psychiatric: He has a normal mood and affect. His behavior is normal. Judgment and thought content normal.   Vitals reviewed.      Vitals:    01/08/19 1025   BP:  "130/88   BP Location: Right arm   Patient Position: Sitting   BP Method: X-Large (Manual)   Pulse: 84   Resp: 18   Temp: 98.1 °F (36.7 °C)   TempSrc: Oral   Weight: 124.6 kg (274 lb 11.1 oz)   Height: 6' 1" (1.854 m)       MOST RECENT LABS IN OUR ELECTRONIC MEDICAL RECORD:     Results for orders placed or performed in visit on 03/16/18   CBC Without Differential   Result Value Ref Range    WBC 8.22 3.90 - 12.70 K/uL    RBC 4.99 4.60 - 6.20 M/uL    Hemoglobin 15.5 14.0 - 18.0 g/dL    Hematocrit 45.7 40.0 - 54.0 %    MCV 92 82 - 98 fL    MCH 31.1 (H) 27.0 - 31.0 pg    MCHC 33.9 32.0 - 36.0 g/dL    RDW 12.4 11.5 - 14.5 %    Platelets 353 (H) 150 - 350 K/uL    MPV 10.5 9.2 - 12.9 fL         Age specific and appropriate preventative healthcare discussed/ health maintenance reviewed. Discussed healthy diet and regular exercise. Discussed age-appropriate preventative screening tests and recommendations. Discussed recommended follow-up based on age and conditions.  "

## 2019-01-12 ENCOUNTER — LAB VISIT (OUTPATIENT)
Dept: LAB | Facility: HOSPITAL | Age: 44
End: 2019-01-12
Attending: FAMILY MEDICINE
Payer: MEDICAID

## 2019-01-12 DIAGNOSIS — Z00.00 WELLNESS EXAMINATION: ICD-10-CM

## 2019-01-12 LAB
ALBUMIN SERPL BCP-MCNC: 3.8 G/DL
ALP SERPL-CCNC: 60 U/L
ALT SERPL W/O P-5'-P-CCNC: 28 U/L
ANION GAP SERPL CALC-SCNC: 8 MMOL/L
AST SERPL-CCNC: 23 U/L
BASOPHILS # BLD AUTO: 0.06 K/UL
BASOPHILS NFR BLD: 0.7 %
BILIRUB SERPL-MCNC: 0.8 MG/DL
BUN SERPL-MCNC: 16 MG/DL
CALCIUM SERPL-MCNC: 9.4 MG/DL
CHLORIDE SERPL-SCNC: 103 MMOL/L
CHOLEST SERPL-MCNC: 164 MG/DL
CHOLEST/HDLC SERPL: 4 {RATIO}
CO2 SERPL-SCNC: 28 MMOL/L
CREAT SERPL-MCNC: 1 MG/DL
DIFFERENTIAL METHOD: ABNORMAL
EOSINOPHIL # BLD AUTO: 0.3 K/UL
EOSINOPHIL NFR BLD: 3.9 %
ERYTHROCYTE [DISTWIDTH] IN BLOOD BY AUTOMATED COUNT: 12.3 %
EST. GFR  (AFRICAN AMERICAN): >60 ML/MIN/1.73 M^2
EST. GFR  (NON AFRICAN AMERICAN): >60 ML/MIN/1.73 M^2
GLUCOSE SERPL-MCNC: 119 MG/DL
HCT VFR BLD AUTO: 48.1 %
HDLC SERPL-MCNC: 41 MG/DL
HDLC SERPL: 25 %
HGB BLD-MCNC: 15.7 G/DL
IMM GRANULOCYTES # BLD AUTO: 0.03 K/UL
IMM GRANULOCYTES NFR BLD AUTO: 0.4 %
LDLC SERPL CALC-MCNC: 103.2 MG/DL
LYMPHOCYTES # BLD AUTO: 3.6 K/UL
LYMPHOCYTES NFR BLD: 42.7 %
MCH RBC QN AUTO: 30.8 PG
MCHC RBC AUTO-ENTMCNC: 32.6 G/DL
MCV RBC AUTO: 94 FL
MONOCYTES # BLD AUTO: 0.6 K/UL
MONOCYTES NFR BLD: 7.4 %
NEUTROPHILS # BLD AUTO: 3.8 K/UL
NEUTROPHILS NFR BLD: 44.9 %
NONHDLC SERPL-MCNC: 123 MG/DL
NRBC BLD-RTO: 0 /100 WBC
PLATELET # BLD AUTO: 363 K/UL
PMV BLD AUTO: 10.5 FL
POTASSIUM SERPL-SCNC: 4.1 MMOL/L
PROT SERPL-MCNC: 7.8 G/DL
RBC # BLD AUTO: 5.1 M/UL
SODIUM SERPL-SCNC: 139 MMOL/L
TRIGL SERPL-MCNC: 99 MG/DL
WBC # BLD AUTO: 8.5 K/UL

## 2019-01-12 PROCEDURE — 85025 COMPLETE CBC W/AUTO DIFF WBC: CPT

## 2019-01-12 PROCEDURE — 36415 COLL VENOUS BLD VENIPUNCTURE: CPT | Mod: PO

## 2019-01-12 PROCEDURE — 80053 COMPREHEN METABOLIC PANEL: CPT

## 2019-01-12 PROCEDURE — 80061 LIPID PANEL: CPT

## 2019-01-14 ENCOUNTER — PATIENT MESSAGE (OUTPATIENT)
Dept: FAMILY MEDICINE | Facility: CLINIC | Age: 44
End: 2019-01-14

## 2019-02-15 RX ORDER — CLOTRIMAZOLE AND BETAMETHASONE DIPROPIONATE 10; .64 MG/G; MG/G
CREAM TOPICAL 2 TIMES DAILY
Qty: 60 G | Refills: 1 | Status: SHIPPED | OUTPATIENT
Start: 2019-02-15 | End: 2020-05-05 | Stop reason: SDUPTHER

## 2019-02-26 RX ORDER — LOSARTAN POTASSIUM 100 MG/1
TABLET ORAL
Qty: 90 TABLET | Refills: 0 | Status: SHIPPED | OUTPATIENT
Start: 2019-02-26 | End: 2019-05-26 | Stop reason: SDUPTHER

## 2019-04-01 ENCOUNTER — PATIENT MESSAGE (OUTPATIENT)
Dept: FAMILY MEDICINE | Facility: CLINIC | Age: 44
End: 2019-04-01

## 2019-04-08 DIAGNOSIS — M51.9 LUMBAR DISC DISEASE: ICD-10-CM

## 2019-04-08 RX ORDER — CELECOXIB 200 MG/1
200 CAPSULE ORAL DAILY
Qty: 90 CAPSULE | Refills: 0 | Status: SHIPPED | OUTPATIENT
Start: 2019-04-08 | End: 2019-07-09 | Stop reason: SDUPTHER

## 2019-04-11 ENCOUNTER — PATIENT MESSAGE (OUTPATIENT)
Dept: FAMILY MEDICINE | Facility: CLINIC | Age: 44
End: 2019-04-11

## 2019-04-11 NOTE — TELEPHONE ENCOUNTER
See my chart message, okay for jury duty excuse for lumbar degenerative disc disease and chronic back pain. I will sign off on this.  I responded to his other question through my chart.

## 2019-04-17 ENCOUNTER — OFFICE VISIT (OUTPATIENT)
Dept: FAMILY MEDICINE | Facility: CLINIC | Age: 44
End: 2019-04-17
Payer: MEDICAID

## 2019-04-17 VITALS
SYSTOLIC BLOOD PRESSURE: 140 MMHG | BODY MASS INDEX: 36.41 KG/M2 | WEIGHT: 274.69 LBS | TEMPERATURE: 98 F | HEIGHT: 73 IN | DIASTOLIC BLOOD PRESSURE: 78 MMHG | OXYGEN SATURATION: 97 % | HEART RATE: 88 BPM

## 2019-04-17 DIAGNOSIS — K13.0 LIP FISSURE: Primary | ICD-10-CM

## 2019-04-17 DIAGNOSIS — J00 ACUTE RHINITIS: ICD-10-CM

## 2019-04-17 PROCEDURE — 99999 PR PBB SHADOW E&M-EST. PATIENT-LVL III: ICD-10-PCS | Mod: PBBFAC,,, | Performed by: PHYSICIAN ASSISTANT

## 2019-04-17 PROCEDURE — 99213 PR OFFICE/OUTPT VISIT, EST, LEVL III, 20-29 MIN: ICD-10-PCS | Mod: S$PBB,,, | Performed by: PHYSICIAN ASSISTANT

## 2019-04-17 PROCEDURE — 99999 PR PBB SHADOW E&M-EST. PATIENT-LVL III: CPT | Mod: PBBFAC,,, | Performed by: PHYSICIAN ASSISTANT

## 2019-04-17 PROCEDURE — 99213 OFFICE O/P EST LOW 20 MIN: CPT | Mod: PBBFAC,PO | Performed by: PHYSICIAN ASSISTANT

## 2019-04-17 PROCEDURE — 99213 OFFICE O/P EST LOW 20 MIN: CPT | Mod: S$PBB,,, | Performed by: PHYSICIAN ASSISTANT

## 2019-04-17 NOTE — PROGRESS NOTES
Subjective:       Patient ID: Jose Boggs is a 43 y.o. male.    Chief Complaint: cracked lip (3 weeks)    HPI   Cracked mid lower lip x 3 wks  Irritated  No hx trauma  Congestion  Mouth breathing at night  Sx worse at night  Pt using moisturizer   Review of Systems   Constitutional: Negative.  Negative for activity change, appetite change, chills, diaphoresis, fatigue, fever and unexpected weight change.   HENT: Positive for congestion and postnasal drip. Negative for sinus pressure, sinus pain and sore throat.    Eyes: Negative.    Respiratory: Negative.    Cardiovascular: Negative.  Negative for chest pain and leg swelling.   Gastrointestinal: Negative.    Endocrine: Negative.    Genitourinary: Negative.    Musculoskeletal: Negative.    Skin: Positive for rash.       Objective:      Physical Exam   Constitutional: He appears well-developed and well-nourished. No distress.   HENT:   Head: Normocephalic and atraumatic.   Right Ear: External ear normal.   Left Ear: External ear normal.   Nose: Nose normal.   Mouth/Throat: Oropharynx is clear and moist. No oropharyngeal exudate.   Sinuses non tender  Mucus clear  Small 1 cm mid lower lip fissure  No sign of infection   Musculoskeletal: He exhibits no edema.   Skin: Skin is warm and dry. No rash noted.   Vitals reviewed.      Assessment:       1. Lip fissure    2. Acute rhinitis        Plan:       Lip fissure    Acute rhinitis    discussed otc's  Discussed moisturizer and then seal with paraffin base sealant   vaporizer

## 2019-05-26 RX ORDER — LOSARTAN POTASSIUM 100 MG/1
TABLET ORAL
Qty: 90 TABLET | Refills: 0 | Status: SHIPPED | OUTPATIENT
Start: 2019-05-26 | End: 2019-08-25 | Stop reason: SDUPTHER

## 2019-06-05 ENCOUNTER — PATIENT MESSAGE (OUTPATIENT)
Dept: FAMILY MEDICINE | Facility: CLINIC | Age: 44
End: 2019-06-05

## 2019-06-06 ENCOUNTER — OFFICE VISIT (OUTPATIENT)
Dept: FAMILY MEDICINE | Facility: CLINIC | Age: 44
End: 2019-06-06
Payer: MEDICAID

## 2019-06-06 VITALS
BODY MASS INDEX: 36.54 KG/M2 | HEART RATE: 74 BPM | SYSTOLIC BLOOD PRESSURE: 128 MMHG | DIASTOLIC BLOOD PRESSURE: 86 MMHG | WEIGHT: 275.69 LBS | TEMPERATURE: 98 F | OXYGEN SATURATION: 97 % | HEIGHT: 73 IN

## 2019-06-06 DIAGNOSIS — H91.92 DECREASED HEARING OF LEFT EAR: Primary | ICD-10-CM

## 2019-06-06 DIAGNOSIS — H61.23 BILATERAL IMPACTED CERUMEN: ICD-10-CM

## 2019-06-06 DIAGNOSIS — L01.00 IMPETIGO: ICD-10-CM

## 2019-06-06 PROCEDURE — 69210 EAR CERUMEN REMOVAL: ICD-10-PCS | Mod: S$PBB,,, | Performed by: NURSE PRACTITIONER

## 2019-06-06 PROCEDURE — 69210 REMOVE IMPACTED EAR WAX UNI: CPT | Mod: PBBFAC,PN | Performed by: NURSE PRACTITIONER

## 2019-06-06 PROCEDURE — 99999 PR PBB SHADOW E&M-EST. PATIENT-LVL IV: ICD-10-PCS | Mod: PBBFAC,,, | Performed by: NURSE PRACTITIONER

## 2019-06-06 PROCEDURE — 99214 OFFICE O/P EST MOD 30 MIN: CPT | Mod: S$PBB,25,, | Performed by: NURSE PRACTITIONER

## 2019-06-06 PROCEDURE — 99999 PR PBB SHADOW E&M-EST. PATIENT-LVL IV: CPT | Mod: PBBFAC,,, | Performed by: NURSE PRACTITIONER

## 2019-06-06 PROCEDURE — 99214 OFFICE O/P EST MOD 30 MIN: CPT | Mod: PBBFAC,PN | Performed by: NURSE PRACTITIONER

## 2019-06-06 PROCEDURE — 69210 REMOVE IMPACTED EAR WAX UNI: CPT | Mod: S$PBB,,, | Performed by: NURSE PRACTITIONER

## 2019-06-06 PROCEDURE — 99214 PR OFFICE/OUTPT VISIT, EST, LEVL IV, 30-39 MIN: ICD-10-PCS | Mod: S$PBB,25,, | Performed by: NURSE PRACTITIONER

## 2019-06-06 RX ORDER — AMOXICILLIN AND CLAVULANATE POTASSIUM 875; 125 MG/1; MG/1
1 TABLET, FILM COATED ORAL EVERY 12 HOURS
Qty: 14 TABLET | Refills: 0 | Status: SHIPPED | OUTPATIENT
Start: 2019-06-06 | End: 2019-06-13 | Stop reason: ALTCHOICE

## 2019-06-06 NOTE — PROCEDURES
Ear Cerumen Removal  Date/Time: 6/6/2019 1:50 PM  Performed by: Gucci Montilla NP  Authorized by: Gucci Montilla NP       Local anesthetic:  None  Location details:  Left ear  Procedure type: curette    Patient tolerance:  Patient tolerated the procedure well with no immediate complications     Copious irrigation.  All of wax removed.
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
## 2019-06-13 ENCOUNTER — OFFICE VISIT (OUTPATIENT)
Dept: FAMILY MEDICINE | Facility: CLINIC | Age: 44
End: 2019-06-13
Payer: MEDICAID

## 2019-06-13 VITALS
DIASTOLIC BLOOD PRESSURE: 74 MMHG | WEIGHT: 275.13 LBS | TEMPERATURE: 98 F | HEART RATE: 81 BPM | HEIGHT: 73 IN | OXYGEN SATURATION: 96 % | SYSTOLIC BLOOD PRESSURE: 124 MMHG | BODY MASS INDEX: 36.46 KG/M2

## 2019-06-13 DIAGNOSIS — K12.1 STOMATITIS: ICD-10-CM

## 2019-06-13 DIAGNOSIS — L01.00 IMPETIGO: Primary | ICD-10-CM

## 2019-06-13 PROCEDURE — 99213 PR OFFICE/OUTPT VISIT, EST, LEVL III, 20-29 MIN: ICD-10-PCS | Mod: S$PBB,,, | Performed by: NURSE PRACTITIONER

## 2019-06-13 PROCEDURE — 99999 PR PBB SHADOW E&M-EST. PATIENT-LVL IV: ICD-10-PCS | Mod: PBBFAC,,, | Performed by: NURSE PRACTITIONER

## 2019-06-13 PROCEDURE — 99214 OFFICE O/P EST MOD 30 MIN: CPT | Mod: PBBFAC,PN | Performed by: NURSE PRACTITIONER

## 2019-06-13 PROCEDURE — 99999 PR PBB SHADOW E&M-EST. PATIENT-LVL IV: CPT | Mod: PBBFAC,,, | Performed by: NURSE PRACTITIONER

## 2019-06-13 PROCEDURE — 99213 OFFICE O/P EST LOW 20 MIN: CPT | Mod: S$PBB,,, | Performed by: NURSE PRACTITIONER

## 2019-06-13 RX ORDER — MUPIROCIN 20 MG/G
OINTMENT TOPICAL 3 TIMES DAILY
Qty: 22 G | Refills: 1 | Status: SHIPPED | OUTPATIENT
Start: 2019-06-13 | End: 2019-06-20

## 2019-06-13 RX ORDER — AMOXICILLIN AND CLAVULANATE POTASSIUM 875; 125 MG/1; MG/1
1 TABLET, FILM COATED ORAL EVERY 12 HOURS
Qty: 14 TABLET | Refills: 0 | Status: SHIPPED | OUTPATIENT
Start: 2019-06-13 | End: 2019-06-20

## 2019-06-13 NOTE — PROGRESS NOTES
"This dictation has been generated using Modal Fluency Dictation some phonetic errors may occur. Please contact author for clarification if needed.     Problem List Items Addressed This Visit     None      Visit Diagnoses     Impetigo    -  Primary    Stomatitis              Orders Placed This Encounter    amoxicillin-clavulanate 875-125mg (AUGMENTIN) 875-125 mg per tablet    mupirocin (BACTROBAN) 2 % ointment     Impetigo antibiotic as above and follow up in 1 week if needed. Consider ENT input next.   Improving. Add Bactroban partly to keep moist and skin protected.     Follow up in about 1 week (around 6/20/2019), or if symptoms worsen or fail to improve.    ________________________________________________________________  ________________________________________________________________      Chief Complaint   Patient presents with    Follow-up     Follow up from last visit of cracked lips. Pt stated "i just finished the antibiotics today."      History of present illness  This 43 y.o. presents today for complaint of follow up rash on lips.  Lips improved.  He still has some irritation.  Still notes some redness on the lower lip somewhat on the sides and LEs in the middle.  Overall improvement noted.  No further ear issues.   Recently seen for problem with his lips and seen by me 6/6/19.  He has been using therapies as directed.  Patient does note a honey crust on them in the morning.  It is not present at the visit.  Denies rash elsewhere.  Tongue is not involved.  Denies any gland swelling. No fever or chills.    Answers for HPI/ROS submitted by the patient on 6/7/2019   activity change: No  unexpected weight change: No  neck pain: No  hearing loss: Yes  rhinorrhea: No  trouble swallowing: No  eye discharge: No  visual disturbance: No  chest tightness: No  wheezing: No  chest pain: No  palpitations: No  blood in stool: No  constipation: No  vomiting: No  diarrhea: No  polydipsia: No  polyuria: No  difficulty " urinating: No  urgency: No  hematuria: No  joint swelling: No  arthralgias: No  headaches: No  weakness: No  confusion: No  dysphoric mood: No      Past medical and social history reviewed.  Patient new to me.  Follows with in the system    Past Medical History:   Diagnosis Date    GERD (gastroesophageal reflux disease)     Hypertension     Low back pain        History reviewed. No pertinent surgical history.    History reviewed. No pertinent family history.    Social History     Socioeconomic History    Marital status:      Spouse name: Not on file    Number of children: Not on file    Years of education: Not on file    Highest education level: Not on file   Occupational History    Not on file   Social Needs    Financial resource strain: Not very hard    Food insecurity:     Worry: Never true     Inability: Never true    Transportation needs:     Medical: No     Non-medical: No   Tobacco Use    Smoking status: Never Smoker    Smokeless tobacco: Never Used   Substance and Sexual Activity    Alcohol use: Not on file    Drug use: Not on file    Sexual activity: Not on file   Lifestyle    Physical activity:     Days per week: 3 days     Minutes per session: 40 min    Stress: Not at all   Relationships    Social connections:     Talks on phone: More than three times a week     Gets together: More than three times a week     Attends Anabaptist service: Not on file     Active member of club or organization: Yes     Attends meetings of clubs or organizations: More than 4 times per year     Relationship status:    Other Topics Concern    Not on file   Social History Narrative    Not on file       Current Outpatient Medications   Medication Sig Dispense Refill    celecoxib (CELEBREX) 200 MG capsule TAKE 1 CAPSULE (200 MG TOTAL) BY MOUTH ONCE DAILY. 90 capsule 0    clotrimazole-betamethasone 1-0.05% (LOTRISONE) cream APPLY TOPICALLY 2 (TWO) TIMES DAILY. 60 g 1    losartan (COZAAR) 100 MG  tablet TAKE 1 TABLET BY MOUTH EVERY DAY 90 tablet 0    omeprazole (PRILOSEC) 20 MG capsule TAKE 1 CAPSULE BY MOUTH EVERY DAY 90 capsule 0    amoxicillin-clavulanate 875-125mg (AUGMENTIN) 875-125 mg per tablet Take 1 tablet by mouth every 12 (twelve) hours. for 7 days 14 tablet 0    mupirocin (BACTROBAN) 2 % ointment Apply topically 3 (three) times daily. Tid to affected area for 7 days 22 g 1     No current facility-administered medications for this visit.        Review of patient's allergies indicates:   Allergen Reactions    Slo-niacin  [niacin]      Other reaction(s): Flushing (skin)       Physical examination  Vitals Reviewed  Gen. Well-dressed well-nourished   Skin warm dry and intact.  No rashes noted.  HEENT.   Irritation noted to the lower lip.  No crusting noted.  No active bleeding.  Neck is supple without adenopathy  Chest.  Respirations are even unlabored.     Neuro. Awake alert oriented x4.  Normal judgment and cognition noted.  Extremities no clubbing cyanosis or edema noted.     Call or return to clinic prn if these symptoms worsen or fail to improve as anticipated.

## 2019-07-03 ENCOUNTER — HOSPITAL ENCOUNTER (EMERGENCY)
Facility: HOSPITAL | Age: 44
Discharge: HOME | End: 2019-07-03
Attending: EMERGENCY MEDICINE
Payer: MEDICAID

## 2019-07-03 VITALS
TEMPERATURE: 98 F | OXYGEN SATURATION: 98 % | BODY MASS INDEX: 34.65 KG/M2 | DIASTOLIC BLOOD PRESSURE: 83 MMHG | HEIGHT: 74 IN | HEART RATE: 90 BPM | WEIGHT: 270 LBS | RESPIRATION RATE: 20 BRPM | SYSTOLIC BLOOD PRESSURE: 141 MMHG
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
## 2019-07-03 DIAGNOSIS — S83.92XA SPRAIN OF LEFT KNEE, UNSPECIFIED LIGAMENT, INITIAL ENCOUNTER: Primary | ICD-10-CM

## 2019-07-03 PROCEDURE — 25000003 PHARM REV CODE 250: Performed by: EMERGENCY MEDICINE

## 2019-07-03 PROCEDURE — 29505 APPLICATION LONG LEG SPLINT: CPT | Mod: LT

## 2019-07-03 PROCEDURE — 99283 EMERGENCY DEPT VISIT LOW MDM: CPT | Mod: 25

## 2019-07-03 RX ORDER — OXYCODONE HYDROCHLORIDE 10 MG/1
10 TABLET ORAL
Status: COMPLETED | OUTPATIENT
Start: 2019-07-03 | End: 2019-07-03

## 2019-07-03 RX ORDER — TRAMADOL HYDROCHLORIDE 50 MG/1
50 TABLET ORAL EVERY 6 HOURS PRN
Qty: 12 TABLET | Refills: 0 | Status: SHIPPED | OUTPATIENT
Start: 2019-07-03 | End: 2020-02-11

## 2019-07-03 RX ADMIN — OXYCODONE HYDROCHLORIDE 10 MG: 10 TABLET ORAL at 04:07

## 2019-07-03 NOTE — ED PROVIDER NOTES
Encounter Date: 7/3/2019    SCRIBE #1 NOTE: I, Georgia Gama, am scribing for, and in the presence of, Baljit Sidhu MD.       History     Chief Complaint   Patient presents with    Knee Pain     left. post fall       Time seen by provider: 3:57 PM on 07/03/2019    Jose Boggs is a 43 y.o. Male who presents to the ED with an onset of left knee pain secondary to a fall 2 hours ago. The pt states that while ambulating he stumbled and fell to the ground, hitting his left knee. He states that he cannot left his lift knee or stand upon it. The patient denies any other symptoms at this time. He has no pertinent PSHx noted and no pertinent orthopedic PMHx noted. He is allergic to Slo-niacin.         The history is provided by the patient.     Review of patient's allergies indicates:   Allergen Reactions    Slo-niacin  [niacin]      Other reaction(s): Flushing (skin)     Past Medical History:   Diagnosis Date    GERD (gastroesophageal reflux disease)     Hypertension     Low back pain      History reviewed. No pertinent surgical history.  History reviewed. No pertinent family history.  Social History     Tobacco Use    Smoking status: Never Smoker    Smokeless tobacco: Never Used   Substance Use Topics    Alcohol use: Not on file    Drug use: Not on file     Review of Systems   Constitutional: Negative for activity change, appetite change, chills, fatigue and fever.   Eyes: Negative for visual disturbance.   Respiratory: Negative for apnea and shortness of breath.    Cardiovascular: Negative for chest pain and palpitations.   Gastrointestinal: Negative for abdominal distention and abdominal pain.   Genitourinary: Negative for difficulty urinating.   Musculoskeletal: Positive for arthralgias (left knee) and gait problem. Negative for neck pain.   Skin: Negative for pallor and rash.   Neurological: Negative for headaches.   Hematological: Does not bruise/bleed easily.   Psychiatric/Behavioral: Negative  for agitation.       Physical Exam     Initial Vitals [07/03/19 1549]   BP Pulse Resp Temp SpO2   (!) 141/83 90 20 97.9 °F (36.6 °C) 98 %      MAP       --         Physical Exam    Nursing note and vitals reviewed.  Constitutional: He appears well-developed and well-nourished.   HENT:   Head: Normocephalic and atraumatic.   Eyes: Conjunctivae are normal.   Neck: Normal range of motion. Neck supple.   Cardiovascular: Normal rate, regular rhythm and normal heart sounds. Exam reveals no gallop and no friction rub.    No murmur heard.  Pulmonary/Chest: Breath sounds normal. No respiratory distress. He has no wheezes. He has no rhonchi. He has no rales.   Musculoskeletal: Normal range of motion.        Left knee: He exhibits no effusion, no LCL laxity and no MCL laxity. No tenderness found.        Left upper leg: He exhibits no tenderness.   No tenderness in thighs. No left knee joint effusion. No medial or lateral laxity or tenderness. Negative for anterior drawer.    Neurological: He is alert and oriented to person, place, and time.   Skin: Skin is warm and dry.   Psychiatric: He has a normal mood and affect.         ED Course   Procedures  Labs Reviewed - No data to display       Imaging Results          X-Ray Knee 3 View Left (Final result)  Result time 07/03/19 17:30:31    Final result by Jacob Schwarz MD (07/03/19 17:30:31)                 Narrative:    EXAMINATION:  XR KNEE 3 VIEW LEFT    CLINICAL HISTORY:  Unspecified fall, initial encounter    TECHNIQUE:  AP, lateral, and Merchant views of the left knee were performed.    COMPARISON:  None    FINDINGS:  No acute fracture.  Rounded ossific fragment along the distal quadriceps tendon likely relates or remotely fragmented enthesophyte.  There are superior patellar pole enthesophytes.  Slight lateral patellar tilt.  Minimal degenerative change in the medial and patellofemoral compartments.  No knee joint effusion.      Electronically signed by: Jacob  Schwarz  Date:    07/03/2019  Time:    17:30                               Medical Decision Making:   History:   Old Medical Records: I decided to obtain old medical records.  Independently Interpreted Test(s):   I have ordered and independently interpreted X-rays - see summary below.       <> Summary of X-Ray Reading(s): Knee x-rays independently interpreted by me failed to demonstrate any fracture or dislocation.  Clinical Tests:   Radiological Study: Ordered and Reviewed  ED Management:  43-year-old male presents with severe knee pain.  He has no effusion or laxity with ligamentous injury less likely.  Symptoms most likely reflect a meniscal injury. He is placed in a knee immobilizer and given crutches with a prescription for tramadol.  He is referred to Dr. Block for further evaluation of this knee pain.       APC / Resident Notes:   I, Dr. Baljit Sidhu III, personally performed the services described in this documentation. All medical record entries made by the scribe were at my direction and in my presence.  I have reviewed the chart and agree that the record reflects my personal performance and is accurate and complete       Scribe Attestation:   Scribe #1: I performed the above scribed service and the documentation accurately describes the services I performed. I attest to the accuracy of the note.               Clinical Impression:       ICD-10-CM ICD-9-CM   1. Sprain of left knee, unspecified ligament, initial encounter S83.92XA 844.9   2. Fall W19.XXXA E888.9         Disposition:   Disposition: Discharged  Condition: Stable                        Baljit Sidhu III, MD  07/03/19 9619

## 2019-07-05 ENCOUNTER — PATIENT MESSAGE (OUTPATIENT)
Dept: FAMILY MEDICINE | Facility: CLINIC | Age: 44
End: 2019-07-05

## 2019-07-05 DIAGNOSIS — M79.605 LEFT LEG PAIN: Primary | ICD-10-CM

## 2019-07-05 NOTE — TELEPHONE ENCOUNTER
See my chart message.  I signed the order for Orthopedics.  He was requesting to be seen on Monday

## 2019-07-08 ENCOUNTER — PATIENT MESSAGE (OUTPATIENT)
Dept: FAMILY MEDICINE | Facility: CLINIC | Age: 44
End: 2019-07-08

## 2019-07-09 DIAGNOSIS — M51.9 LUMBAR DISC DISEASE: ICD-10-CM

## 2019-07-09 RX ORDER — CELECOXIB 200 MG/1
200 CAPSULE ORAL DAILY
Qty: 30 CAPSULE | Refills: 2 | Status: SHIPPED | OUTPATIENT
Start: 2019-07-09 | End: 2019-10-18 | Stop reason: SDUPTHER

## 2019-07-10 ENCOUNTER — OFFICE VISIT (OUTPATIENT)
Dept: FAMILY MEDICINE | Facility: CLINIC | Age: 44
End: 2019-07-10
Payer: MEDICAID

## 2019-07-10 VITALS
DIASTOLIC BLOOD PRESSURE: 94 MMHG | SYSTOLIC BLOOD PRESSURE: 132 MMHG | TEMPERATURE: 99 F | WEIGHT: 273.38 LBS | OXYGEN SATURATION: 98 % | HEIGHT: 74 IN | HEART RATE: 84 BPM | BODY MASS INDEX: 35.08 KG/M2

## 2019-07-10 DIAGNOSIS — S89.92XA LEFT KNEE INJURY, INITIAL ENCOUNTER: Primary | ICD-10-CM

## 2019-07-10 PROCEDURE — 99999 PR PBB SHADOW E&M-EST. PATIENT-LVL III: ICD-10-PCS | Mod: PBBFAC,,, | Performed by: FAMILY MEDICINE

## 2019-07-10 PROCEDURE — 99214 OFFICE O/P EST MOD 30 MIN: CPT | Mod: S$PBB,,, | Performed by: FAMILY MEDICINE

## 2019-07-10 PROCEDURE — 99213 OFFICE O/P EST LOW 20 MIN: CPT | Mod: PBBFAC,PN | Performed by: FAMILY MEDICINE

## 2019-07-10 PROCEDURE — 99214 PR OFFICE/OUTPT VISIT, EST, LEVL IV, 30-39 MIN: ICD-10-PCS | Mod: S$PBB,,, | Performed by: FAMILY MEDICINE

## 2019-07-10 PROCEDURE — 99999 PR PBB SHADOW E&M-EST. PATIENT-LVL III: CPT | Mod: PBBFAC,,, | Performed by: FAMILY MEDICINE

## 2019-07-10 RX ORDER — NABUMETONE 500 MG/1
500 TABLET, FILM COATED ORAL 2 TIMES DAILY
Qty: 60 TABLET | Refills: 1 | Status: SHIPPED | OUTPATIENT
Start: 2019-07-10 | End: 2020-01-16

## 2019-07-10 NOTE — LETTER
July 10, 2019      Jose Disla MD  [address]  [place] [address]           [facility] [address]  [address]  [place] [address]  Phone: [phone]  Fax: [phone]          Patient: Jose Boggs   MR Number: [id number]   YOB: 1975   Date of Visit: 7/10/2019       Dear Dr. Jose Disla:    Thank you for referring Jose Boggs to me for evaluation. Attached you will find relevant portions of my assessment and plan of care.    If you have questions, please do not hesitate to call me. I look forward to following Jose Boggs along with you.    Sincerely,    Amol Bales MD    Enclosure  CC:  No Recipients    If you would like to receive this communication electronically, please contact [email or web address] or [phone] to request more information on [facility] [employer] access.    For providers and/or their staff who would like to refer a patient to [facility], please contact us through our one-stop-shop provider referral line, [facility], at [phone].    If you feel you have received this communication in error or would no longer like to receive these types of communications, please e-mail [email or web address]

## 2019-07-10 NOTE — PROGRESS NOTES
Assessment and Plan:    1. Left knee injury, initial encounter  Likely distal quadriceps tear.  Ice, compression, rest.  Patient will do progressive gentle stretching, quadriceps sets, un weighted knee extensions as tolerated.  Continue using crutch as needed.  Return to clinic if pain does not improve from today's level or severe weakness.  Imaging not necessary at this time, I do not believe the knee joint is the primary cause of his pain and discomfort.  - nabumetone (RELAFEN) 500 MG tablet; Take 1 tablet (500 mg total) by mouth 2 (two) times daily.  Dispense: 60 tablet; Refill: 1          ______________________________________________________________________  Subjective:    Chief Complaint:  Chief Complaint   Patient presents with    Leg Pain     Left        HPI:  Jose is a 43 y.o. year old     Leg pain  Patient presented to emergency room on July 3, 2019 complaining of left knee pain. Pain started after a fall.  Reports he was having trouble standing and or bending the joint.  He was evaluated in the emergency room, imaging was unremarkable.  He was recommended a knee brace and discharged to follow up with me today.  He was given prescription for knee immobilizer and crutches to aid while he heals.  Was prescribed tramadol 50 mg p.r.n. for pain.    Reports pain is slightly improved.  Is regaining ability to extend left knee.      Medications:  Current Outpatient Medications on File Prior to Visit   Medication Sig Dispense Refill    celecoxib (CELEBREX) 200 MG capsule TAKE 1 CAPSULE (200 MG TOTAL) BY MOUTH ONCE DAILY. 30 capsule 2    clotrimazole-betamethasone 1-0.05% (LOTRISONE) cream APPLY TOPICALLY 2 (TWO) TIMES DAILY. 60 g 1    losartan (COZAAR) 100 MG tablet TAKE 1 TABLET BY MOUTH EVERY DAY 90 tablet 0    omeprazole (PRILOSEC) 20 MG capsule TAKE 1 CAPSULE BY MOUTH EVERY DAY 90 capsule 0    traMADol (ULTRAM) 50 mg tablet Take 1 tablet (50 mg total) by mouth every 6 (six) hours as needed for Pain. 12  "tablet 0     No current facility-administered medications on file prior to visit.        Review of Systems:  Review of Systems   Constitutional: Positive for activity change. Negative for unexpected weight change.   HENT: Negative for hearing loss, rhinorrhea and trouble swallowing.    Eyes: Negative for discharge and visual disturbance.   Respiratory: Negative for chest tightness and wheezing.    Cardiovascular: Negative for chest pain and palpitations.   Gastrointestinal: Negative for blood in stool, constipation, diarrhea and vomiting.   Endocrine: Negative for polydipsia and polyuria.   Genitourinary: Negative for difficulty urinating, hematuria and urgency.   Musculoskeletal: Positive for arthralgias and joint swelling. Negative for neck pain.   Neurological: Negative for weakness and headaches.   Psychiatric/Behavioral: Negative for confusion and dysphoric mood.       Past Medical History:  Past Medical History:   Diagnosis Date    GERD (gastroesophageal reflux disease)     Hypertension     Low back pain        Objective:    Vitals:  Vitals:    07/10/19 0841   BP: (!) 132/94   Pulse: 84   Temp: 98.5 °F (36.9 °C)   TempSrc: Oral   SpO2: 98%   Weight: 124 kg (273 lb 5.9 oz)   Height: 6' 2" (1.88 m)   PainSc:   2   PainLoc: Leg       Physical Exam   Constitutional: No distress.   HENT:   Head: Normocephalic and atraumatic.   Eyes: Pupils are equal, round, and reactive to light. EOM are normal.   Neck: Neck supple.   Cardiovascular: Normal rate and regular rhythm. Exam reveals no friction rub.   No murmur heard.  Pulmonary/Chest: Effort normal and breath sounds normal.   Abdominal: Soft. Bowel sounds are normal. He exhibits no distension. There is no tenderness.   Musculoskeletal:   Localized swelling and mild ecchymosis along the distal quadriceps muscle.  Tender to palpation along the distal quadriceps.  The knee exam was fairly unremarkable.  No medial or lateral joint line tenderness. No patellar tendon " tenderness. Mild tenderness of quadriceps tendon.  Ligaments intact.   Skin: Skin is warm and dry. No rash noted.   Psychiatric: He has a normal mood and affect. His behavior is normal.       Data:  Previous Emergency room records reviewed and pertinent for Evaluation of knee pain.      Amol Bales MD  Family Medicine

## 2019-07-24 RX ORDER — OMEPRAZOLE 20 MG/1
CAPSULE, DELAYED RELEASE ORAL
Qty: 90 CAPSULE | Refills: 1 | Status: SHIPPED | OUTPATIENT
Start: 2019-07-24 | End: 2020-01-20

## 2019-08-25 RX ORDER — LOSARTAN POTASSIUM 100 MG/1
TABLET ORAL
Qty: 90 TABLET | Refills: 0 | Status: SHIPPED | OUTPATIENT
Start: 2019-08-25 | End: 2019-11-21 | Stop reason: SDUPTHER

## 2019-10-18 DIAGNOSIS — M51.9 LUMBAR DISC DISEASE: ICD-10-CM

## 2019-10-18 RX ORDER — CELECOXIB 200 MG/1
200 CAPSULE ORAL DAILY
Qty: 30 CAPSULE | Refills: 2 | Status: SHIPPED | OUTPATIENT
Start: 2019-10-18 | End: 2020-01-16

## 2019-11-21 RX ORDER — LOSARTAN POTASSIUM 100 MG/1
TABLET ORAL
Qty: 90 TABLET | Refills: 1 | Status: SHIPPED | OUTPATIENT
Start: 2019-11-21 | End: 2019-12-03

## 2019-12-02 ENCOUNTER — PATIENT MESSAGE (OUTPATIENT)
Dept: FAMILY MEDICINE | Facility: CLINIC | Age: 44
End: 2019-12-02

## 2019-12-03 ENCOUNTER — PATIENT MESSAGE (OUTPATIENT)
Dept: FAMILY MEDICINE | Facility: CLINIC | Age: 44
End: 2019-12-03

## 2019-12-03 RX ORDER — OLMESARTAN MEDOXOMIL 20 MG/1
20 TABLET ORAL DAILY
Qty: 90 TABLET | Refills: 1 | Status: SHIPPED | OUTPATIENT
Start: 2019-12-03 | End: 2020-03-30

## 2019-12-03 RX ORDER — VALSARTAN 160 MG/1
160 TABLET ORAL DAILY
Qty: 90 TABLET | Refills: 1 | Status: SHIPPED | OUTPATIENT
Start: 2019-12-03 | End: 2019-12-03

## 2019-12-03 NOTE — TELEPHONE ENCOUNTER
Per Patient:    might have misspelled it but pharmacist said not to take the losartan or valsartan.   Problem: Fluid Volume Deficit  Goal: Fluid Balance    Intervention: Monitor and Manage Hypovolemia  Pt admin fluids today

## 2019-12-03 NOTE — TELEPHONE ENCOUNTER
See my chart message is.  There is no way I can control the manufacture of the medication.  That is up to the pharmacist and that will change from month to month so to relieve his anxiety should change the product altogether.  I will send in valsartan.  160 mg, must check blood pressure to make sure this is adequate.

## 2020-01-16 DIAGNOSIS — M51.9 LUMBAR DISC DISEASE: ICD-10-CM

## 2020-01-16 RX ORDER — CELECOXIB 200 MG/1
200 CAPSULE ORAL DAILY
Qty: 30 CAPSULE | Refills: 2 | Status: SHIPPED | OUTPATIENT
Start: 2020-01-16 | End: 2020-04-13

## 2020-01-20 RX ORDER — OMEPRAZOLE 20 MG/1
CAPSULE, DELAYED RELEASE ORAL
Qty: 90 CAPSULE | Refills: 1 | Status: SHIPPED | OUTPATIENT
Start: 2020-01-20 | End: 2020-07-27

## 2020-02-11 ENCOUNTER — OFFICE VISIT (OUTPATIENT)
Dept: FAMILY MEDICINE | Facility: CLINIC | Age: 45
End: 2020-02-11
Payer: MEDICAID

## 2020-02-11 VITALS
TEMPERATURE: 99 F | WEIGHT: 273.38 LBS | DIASTOLIC BLOOD PRESSURE: 80 MMHG | HEIGHT: 74 IN | HEART RATE: 92 BPM | RESPIRATION RATE: 18 BRPM | SYSTOLIC BLOOD PRESSURE: 114 MMHG | BODY MASS INDEX: 35.08 KG/M2

## 2020-02-11 DIAGNOSIS — I10 ESSENTIAL HYPERTENSION: Primary | ICD-10-CM

## 2020-02-11 DIAGNOSIS — K21.9 GASTROESOPHAGEAL REFLUX DISEASE, ESOPHAGITIS PRESENCE NOT SPECIFIED: ICD-10-CM

## 2020-02-11 DIAGNOSIS — M51.9 LUMBAR DISC DISEASE: ICD-10-CM

## 2020-02-11 PROCEDURE — 99213 OFFICE O/P EST LOW 20 MIN: CPT | Mod: PBBFAC,PN | Performed by: FAMILY MEDICINE

## 2020-02-11 PROCEDURE — 99214 OFFICE O/P EST MOD 30 MIN: CPT | Mod: S$PBB,,, | Performed by: FAMILY MEDICINE

## 2020-02-11 PROCEDURE — 99999 PR PBB SHADOW E&M-EST. PATIENT-LVL III: CPT | Mod: PBBFAC,,, | Performed by: FAMILY MEDICINE

## 2020-02-11 PROCEDURE — 99999 PR PBB SHADOW E&M-EST. PATIENT-LVL III: ICD-10-PCS | Mod: PBBFAC,,, | Performed by: FAMILY MEDICINE

## 2020-02-11 PROCEDURE — 99214 PR OFFICE/OUTPT VISIT, EST, LEVL IV, 30-39 MIN: ICD-10-PCS | Mod: S$PBB,,, | Performed by: FAMILY MEDICINE

## 2020-02-11 NOTE — PROGRESS NOTES
THIS DOCUMENT WAS MADE IN PART WITH VOICE RECOGNITION SOFTWARE.  OCCASIONALLY THIS SOFTWARE WILL MISINTERPRET WORDS OR PHRASES.      Jose Boggs  1975    Jose was seen today for annual exam.    Diagnoses and all orders for this visit:    Essential hypertension  -     Comprehensive metabolic panel; Future  -     Lipid panel; Future  -     TSH; Future  -     CBC auto differential; Future  Chronic condition, appears stable and well controlled.  Continue Benicar.  Continue to work on exercise and healthy diet and weight management.    Lumbar disc disease  He reports improvement in symptoms since he stopped driving.  He still has some soreness and occasional right-sided sciatica.  But symptoms are generally much better tolerated.  Still he has significant decrease in range of motion very poor flexibility so I encouraged him to continue regular exercise work more on stretching.  Continue Celebrex    Gastroesophageal reflux disease, esophagitis presence not specified  Symptoms are controlled with omeprazole.      Subjective     Chief Complaint   Patient presents with    Annual Exam       HPI    Routine follow-up.  History of hypertension, chronic stable well controlled.  Lumbar degenerative disc disease and chronic back pain.  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  He does work a sedentary job in an office now but still has more flexibility and is getting up moving around more which seems to help.  Reflux is stable and controlled    Active Ambulatory Problems     Diagnosis Date Noted    Hypertension 12/14/2012    GERD (gastroesophageal reflux disease) 12/14/2012    Lumbar disc disease 12/09/2013    Myalgia and myositis 12/09/2013     Resolved Ambulatory Problems     Diagnosis Date Noted    No Resolved Ambulatory Problems     Past Medical History:   Diagnosis Date    Low back pain          Review of Systems   Constitutional: Negative for activity change and  unexpected weight change.   HENT: Negative for hearing loss, rhinorrhea and trouble swallowing.    Eyes: Negative for discharge and visual disturbance.   Respiratory: Negative for chest tightness and wheezing.    Cardiovascular: Negative for chest pain and palpitations.   Gastrointestinal: Negative for blood in stool, constipation, diarrhea and vomiting.   Endocrine: Negative for polydipsia and polyuria.   Genitourinary: Negative for difficulty urinating, hematuria and urgency.   Musculoskeletal: Negative for arthralgias, joint swelling and neck pain.   Neurological: Negative for weakness and headaches.   Psychiatric/Behavioral: Negative for confusion and dysphoric mood.       Objective     Physical Exam   Constitutional: He is oriented to person, place, and time. He appears well-developed and well-nourished. No distress.   HENT:   Head: Normocephalic and atraumatic.   Right Ear: External ear normal.   Left Ear: External ear normal.   Nose: Nose normal.   Mouth/Throat: Oropharynx is clear and moist. No oropharyngeal exudate.   Eyes: Pupils are equal, round, and reactive to light. Conjunctivae and EOM are normal. Right eye exhibits no discharge. Left eye exhibits no discharge. No scleral icterus.   Neck: Normal range of motion. Neck supple. No JVD present. No thyromegaly present.   Cardiovascular: Normal rate, regular rhythm and normal heart sounds. Exam reveals no gallop.   No murmur heard.  Pulmonary/Chest: Effort normal. No stridor. No respiratory distress. He has no wheezes.   Abdominal: Soft. Bowel sounds are normal. He exhibits no distension. There is no tenderness. There is no rebound and no guarding.   Neurological: He is alert and oriented to person, place, and time. He has normal reflexes.   Skin: Skin is warm and dry. He is not diaphoretic.   Psychiatric: He has a normal mood and affect. His behavior is normal.   Vitals reviewed.    Vitals:    02/11/20 1511   BP: 114/80   BP Location: Left arm   Patient  "Position: Sitting   BP Method: Large (Manual)   Pulse: 92   Resp: 18   Temp: 98.9 °F (37.2 °C)   TempSrc: Oral   Weight: 124 kg (273 lb 5.9 oz)   Height: 6' 2" (1.88 m)       MOST RECENT LABS IN OUR ELECTRONIC MEDICAL RECORD:     Results for orders placed or performed in visit on 01/12/19   Comprehensive metabolic panel   Result Value Ref Range    Sodium 139 136 - 145 mmol/L    Potassium 4.1 3.5 - 5.1 mmol/L    Chloride 103 95 - 110 mmol/L    CO2 28 23 - 29 mmol/L    Glucose 119 (H) 70 - 110 mg/dL    BUN, Bld 16 6 - 20 mg/dL    Creatinine 1.0 0.5 - 1.4 mg/dL    Calcium 9.4 8.7 - 10.5 mg/dL    Total Protein 7.8 6.0 - 8.4 g/dL    Albumin 3.8 3.5 - 5.2 g/dL    Total Bilirubin 0.8 0.1 - 1.0 mg/dL    Alkaline Phosphatase 60 55 - 135 U/L    AST 23 10 - 40 U/L    ALT 28 10 - 44 U/L    Anion Gap 8 8 - 16 mmol/L    eGFR if African American >60.0 >60 mL/min/1.73 m^2    eGFR if non African American >60.0 >60 mL/min/1.73 m^2   Lipid panel   Result Value Ref Range    Cholesterol 164 120 - 199 mg/dL    Triglycerides 99 30 - 150 mg/dL    HDL 41 40 - 75 mg/dL    LDL Cholesterol 103.2 63.0 - 159.0 mg/dL    Hdl/Cholesterol Ratio 25.0 20.0 - 50.0 %    Total Cholesterol/HDL Ratio 4.0 2.0 - 5.0    Non-HDL Cholesterol 123 mg/dL   CBC auto differential   Result Value Ref Range    WBC 8.50 3.90 - 12.70 K/uL    RBC 5.10 4.60 - 6.20 M/uL    Hemoglobin 15.7 14.0 - 18.0 g/dL    Hematocrit 48.1 40.0 - 54.0 %    Mean Corpuscular Volume 94 82 - 98 fL    Mean Corpuscular Hemoglobin 30.8 27.0 - 31.0 pg    Mean Corpuscular Hemoglobin Conc 32.6 32.0 - 36.0 g/dL    RDW 12.3 11.5 - 14.5 %    Platelets 363 (H) 150 - 350 K/uL    MPV 10.5 9.2 - 12.9 fL    Immature Granulocytes 0.4 0.0 - 0.5 %    Gran # (ANC) 3.8 1.8 - 7.7 K/uL    Immature Grans (Abs) 0.03 0.00 - 0.04 K/uL    Lymph # 3.6 1.0 - 4.8 K/uL    Mono # 0.6 0.3 - 1.0 K/uL    Eos # 0.3 0.0 - 0.5 K/uL    Baso # 0.06 0.00 - 0.20 K/uL    nRBC 0 0 /100 WBC    Gran% 44.9 38.0 - 73.0 %    Lymph% 42.7 " 18.0 - 48.0 %    Mono% 7.4 4.0 - 15.0 %    Eosinophil% 3.9 0.0 - 8.0 %    Basophil% 0.7 0.0 - 1.9 %    Differential Method Automated

## 2020-02-15 ENCOUNTER — LAB VISIT (OUTPATIENT)
Dept: LAB | Facility: HOSPITAL | Age: 45
End: 2020-02-15
Attending: FAMILY MEDICINE
Payer: MEDICAID

## 2020-02-15 DIAGNOSIS — I10 ESSENTIAL HYPERTENSION: ICD-10-CM

## 2020-02-15 LAB
ALBUMIN SERPL BCP-MCNC: 3.7 G/DL (ref 3.5–5.2)
ALP SERPL-CCNC: 64 U/L (ref 55–135)
ALT SERPL W/O P-5'-P-CCNC: 31 U/L (ref 10–44)
ANION GAP SERPL CALC-SCNC: 7 MMOL/L (ref 8–16)
AST SERPL-CCNC: 22 U/L (ref 10–40)
BASOPHILS # BLD AUTO: 0.1 K/UL (ref 0–0.2)
BASOPHILS NFR BLD: 1 % (ref 0–1.9)
BILIRUB SERPL-MCNC: 0.5 MG/DL (ref 0.1–1)
BUN SERPL-MCNC: 13 MG/DL (ref 6–20)
CALCIUM SERPL-MCNC: 9.5 MG/DL (ref 8.7–10.5)
CHLORIDE SERPL-SCNC: 104 MMOL/L (ref 95–110)
CHOLEST SERPL-MCNC: 173 MG/DL (ref 120–199)
CHOLEST/HDLC SERPL: 4.6 {RATIO} (ref 2–5)
CO2 SERPL-SCNC: 27 MMOL/L (ref 23–29)
CREAT SERPL-MCNC: 1 MG/DL (ref 0.5–1.4)
DIFFERENTIAL METHOD: ABNORMAL
EOSINOPHIL # BLD AUTO: 1 K/UL (ref 0–0.5)
EOSINOPHIL NFR BLD: 9.9 % (ref 0–8)
ERYTHROCYTE [DISTWIDTH] IN BLOOD BY AUTOMATED COUNT: 12.4 % (ref 11.5–14.5)
EST. GFR  (AFRICAN AMERICAN): >60 ML/MIN/1.73 M^2
EST. GFR  (NON AFRICAN AMERICAN): >60 ML/MIN/1.73 M^2
GLUCOSE SERPL-MCNC: 180 MG/DL (ref 70–110)
HCT VFR BLD AUTO: 46.6 % (ref 40–54)
HDLC SERPL-MCNC: 38 MG/DL (ref 40–75)
HDLC SERPL: 22 % (ref 20–50)
HGB BLD-MCNC: 15.3 G/DL (ref 14–18)
IMM GRANULOCYTES # BLD AUTO: 0.03 K/UL (ref 0–0.04)
IMM GRANULOCYTES NFR BLD AUTO: 0.3 % (ref 0–0.5)
LDLC SERPL CALC-MCNC: 109.8 MG/DL (ref 63–159)
LYMPHOCYTES # BLD AUTO: 3.9 K/UL (ref 1–4.8)
LYMPHOCYTES NFR BLD: 38.9 % (ref 18–48)
MCH RBC QN AUTO: 30.7 PG (ref 27–31)
MCHC RBC AUTO-ENTMCNC: 32.8 G/DL (ref 32–36)
MCV RBC AUTO: 94 FL (ref 82–98)
MONOCYTES # BLD AUTO: 0.8 K/UL (ref 0.3–1)
MONOCYTES NFR BLD: 7.4 % (ref 4–15)
NEUTROPHILS # BLD AUTO: 4.3 K/UL (ref 1.8–7.7)
NEUTROPHILS NFR BLD: 42.5 % (ref 38–73)
NONHDLC SERPL-MCNC: 135 MG/DL
NRBC BLD-RTO: 0 /100 WBC
PLATELET # BLD AUTO: 335 K/UL (ref 150–350)
PMV BLD AUTO: 10.6 FL (ref 9.2–12.9)
POTASSIUM SERPL-SCNC: 4.2 MMOL/L (ref 3.5–5.1)
PROT SERPL-MCNC: 7.6 G/DL (ref 6–8.4)
RBC # BLD AUTO: 4.98 M/UL (ref 4.6–6.2)
SODIUM SERPL-SCNC: 138 MMOL/L (ref 136–145)
TRIGL SERPL-MCNC: 126 MG/DL (ref 30–150)
TSH SERPL DL<=0.005 MIU/L-ACNC: 1.1 UIU/ML (ref 0.4–4)
WBC # BLD AUTO: 10.09 K/UL (ref 3.9–12.7)

## 2020-02-15 PROCEDURE — 84443 ASSAY THYROID STIM HORMONE: CPT

## 2020-02-15 PROCEDURE — 80053 COMPREHEN METABOLIC PANEL: CPT

## 2020-02-15 PROCEDURE — 85025 COMPLETE CBC W/AUTO DIFF WBC: CPT

## 2020-02-15 PROCEDURE — 36415 COLL VENOUS BLD VENIPUNCTURE: CPT | Mod: PO

## 2020-02-15 PROCEDURE — 80061 LIPID PANEL: CPT

## 2020-02-16 ENCOUNTER — TELEPHONE (OUTPATIENT)
Dept: FAMILY MEDICINE | Facility: CLINIC | Age: 45
End: 2020-02-16

## 2020-02-16 DIAGNOSIS — R73.09 ELEVATED GLUCOSE: Primary | ICD-10-CM

## 2020-02-17 ENCOUNTER — PATIENT MESSAGE (OUTPATIENT)
Dept: FAMILY MEDICINE | Facility: CLINIC | Age: 45
End: 2020-02-17

## 2020-02-19 ENCOUNTER — TELEPHONE (OUTPATIENT)
Dept: FAMILY MEDICINE | Facility: CLINIC | Age: 45
End: 2020-02-19

## 2020-02-19 ENCOUNTER — LAB VISIT (OUTPATIENT)
Dept: LAB | Facility: HOSPITAL | Age: 45
End: 2020-02-19
Attending: FAMILY MEDICINE
Payer: MEDICAID

## 2020-02-19 ENCOUNTER — PATIENT MESSAGE (OUTPATIENT)
Dept: FAMILY MEDICINE | Facility: CLINIC | Age: 45
End: 2020-02-19

## 2020-02-19 DIAGNOSIS — R73.09 ELEVATED GLUCOSE: ICD-10-CM

## 2020-02-19 LAB
ESTIMATED AVG GLUCOSE: 197 MG/DL (ref 68–131)
HBA1C MFR BLD HPLC: 8.5 % (ref 4–5.6)

## 2020-02-19 PROCEDURE — 36415 COLL VENOUS BLD VENIPUNCTURE: CPT | Mod: PO

## 2020-02-19 PROCEDURE — 83036 HEMOGLOBIN GLYCOSYLATED A1C: CPT

## 2020-02-19 RX ORDER — DEXTROSE 4 G
TABLET,CHEWABLE ORAL
Qty: 1 EACH | Status: SHIPPED | OUTPATIENT
Start: 2020-02-19 | End: 2023-08-04

## 2020-02-19 NOTE — TELEPHONE ENCOUNTER
Patient aware of labs.  A1c elevated suggesting new onset diabetes.  Please schedule a follow-up with me within the next few weeks.  I sent in a prescription for a glucometer and asked him to pick this up and bring to the appointment and we can show him how to use it.

## 2020-02-21 DIAGNOSIS — E11.9 TYPE 2 DIABETES MELLITUS WITHOUT COMPLICATION, UNSPECIFIED WHETHER LONG TERM INSULIN USE: ICD-10-CM

## 2020-03-04 ENCOUNTER — OFFICE VISIT (OUTPATIENT)
Dept: FAMILY MEDICINE | Facility: CLINIC | Age: 45
End: 2020-03-04
Payer: MEDICAID

## 2020-03-04 VITALS
HEIGHT: 74 IN | DIASTOLIC BLOOD PRESSURE: 84 MMHG | TEMPERATURE: 99 F | BODY MASS INDEX: 34.69 KG/M2 | WEIGHT: 270.31 LBS | HEART RATE: 88 BPM | RESPIRATION RATE: 18 BRPM | SYSTOLIC BLOOD PRESSURE: 132 MMHG

## 2020-03-04 DIAGNOSIS — E11.9 NEW ONSET TYPE 2 DIABETES MELLITUS: Primary | ICD-10-CM

## 2020-03-04 PROCEDURE — 99999 PR PBB SHADOW E&M-EST. PATIENT-LVL III: CPT | Mod: PBBFAC,,, | Performed by: FAMILY MEDICINE

## 2020-03-04 PROCEDURE — 99214 OFFICE O/P EST MOD 30 MIN: CPT | Mod: S$PBB,,, | Performed by: FAMILY MEDICINE

## 2020-03-04 PROCEDURE — 99214 PR OFFICE/OUTPT VISIT, EST, LEVL IV, 30-39 MIN: ICD-10-PCS | Mod: S$PBB,,, | Performed by: FAMILY MEDICINE

## 2020-03-04 PROCEDURE — 99999 PR PBB SHADOW E&M-EST. PATIENT-LVL III: ICD-10-PCS | Mod: PBBFAC,,, | Performed by: FAMILY MEDICINE

## 2020-03-04 PROCEDURE — 99213 OFFICE O/P EST LOW 20 MIN: CPT | Mod: PBBFAC,PN | Performed by: FAMILY MEDICINE

## 2020-03-04 RX ORDER — LANCETS
EACH MISCELLANEOUS
COMMUNITY
Start: 2020-02-19 | End: 2020-09-23

## 2020-03-04 NOTE — PROGRESS NOTES
THIS DOCUMENT WAS MADE IN PART WITH VOICE RECOGNITION SOFTWARE.  OCCASIONALLY THIS SOFTWARE WILL MISINTERPRET WORDS OR PHRASES.      Jose Boggs  1975    Jose was seen today for follow-up.    Diagnoses and all orders for this visit:    New onset type 2 diabetes mellitus  -     Ambulatory referral/consult to Diabetes Education; Future  -     Basic metabolic panel; Future  -     Hemoglobin A1c; Future    Discussed diet in detail.  We will arrange consultation with diabetic education but I did give instruction on starting.  He was instructed on how to check glucose.  He will check once a day, every other morning and then days in between vary between mid day evening before bed just to get a better sample.  I would like him to report these readings to me in a few weeks.  May consider beginning metformin if not rapidly dropping.  But there is significant room for dietary improvement so I would like his home readings after few weeks before starting any medications    Appointment duration greater than 25 min., more than 50% face-to-face counseling      Subjective     Chief Complaint   Patient presents with    Follow-up     hypertension/lab results       HPI    Follow-up after recent labs.  Fasting glucose 180.  Followed by hemoglobin A1c of 8.5.  Just got back from vacation, has not made any significant dietary changes yet but willing to do so now.    Active Ambulatory Problems     Diagnosis Date Noted    Hypertension 12/14/2012    GERD (gastroesophageal reflux disease) 12/14/2012    Lumbar disc disease 12/09/2013    Myalgia and myositis 12/09/2013     Resolved Ambulatory Problems     Diagnosis Date Noted    No Resolved Ambulatory Problems     Past Medical History:   Diagnosis Date    Low back pain          Review of Systems  He denies any acute vision changes urinary changes or other.    Objective     Physical Exam  Vitals:    03/04/20 1615   BP: 132/84   BP Location: Right arm   Patient Position:  "Sitting   BP Method: Medium (Manual)   Pulse: 88   Resp: 18   Temp: 98.8 °F (37.1 °C)   TempSrc: Oral   Weight: 122.6 kg (270 lb 4.5 oz)   Height: 6' 2" (1.88 m)       MOST RECENT LABS IN OUR ELECTRONIC MEDICAL RECORD:     Results for orders placed or performed in visit on 02/19/20   Hemoglobin A1c   Result Value Ref Range    Hemoglobin A1C 8.5 (H) 4.0 - 5.6 %    Estimated Avg Glucose 197 (H) 68 - 131 mg/dL         "

## 2020-03-16 ENCOUNTER — PATIENT OUTREACH (OUTPATIENT)
Dept: ADMINISTRATIVE | Facility: OTHER | Age: 45
End: 2020-03-16

## 2020-03-30 RX ORDER — OLMESARTAN MEDOXOMIL 20 MG/1
TABLET ORAL
Qty: 90 TABLET | Refills: 1 | Status: SHIPPED | OUTPATIENT
Start: 2020-03-30 | End: 2020-09-25

## 2020-04-11 DIAGNOSIS — M51.9 LUMBAR DISC DISEASE: ICD-10-CM

## 2020-04-13 ENCOUNTER — PATIENT OUTREACH (OUTPATIENT)
Dept: ADMINISTRATIVE | Facility: OTHER | Age: 45
End: 2020-04-13

## 2020-04-13 RX ORDER — CELECOXIB 200 MG/1
200 CAPSULE ORAL DAILY
Qty: 30 CAPSULE | Refills: 2 | Status: SHIPPED | OUTPATIENT
Start: 2020-04-13 | End: 2020-07-08

## 2020-04-20 ENCOUNTER — PATIENT OUTREACH (OUTPATIENT)
Dept: ADMINISTRATIVE | Facility: OTHER | Age: 45
End: 2020-04-20

## 2020-04-21 ENCOUNTER — CLINICAL SUPPORT (OUTPATIENT)
Dept: DIABETES | Facility: CLINIC | Age: 45
End: 2020-04-21
Payer: MEDICAID

## 2020-04-21 DIAGNOSIS — E11.9 NEW ONSET TYPE 2 DIABETES MELLITUS: ICD-10-CM

## 2020-04-21 PROCEDURE — G0108 PR DIAB MANAGE TRN  PER INDIV: ICD-10-PCS | Mod: 95,,, | Performed by: DIETITIAN, REGISTERED

## 2020-04-21 PROCEDURE — G0108 DIAB MANAGE TRN  PER INDIV: HCPCS | Mod: 95,,, | Performed by: DIETITIAN, REGISTERED

## 2020-04-21 NOTE — PROGRESS NOTES
Diabetes Education  Author: Jackeline Chowdhury RD, CDE  Date: 4/21/2020    Diabetes Care Management Summary  Diabetes Education Record Assessment/Progress: Initial  Current Diabetes Risk Level: Moderate     Last A1c:   Lab Results   Component Value Date    HGBA1C 8.5 (H) 02/19/2020     Last visit with Diabetes Educator: Last Education Visit: Not Found    Diabetes Type  Diabetes Type : Type II    Diabetes History  Diabetes Diagnosis: 0-1 year-Patient is newly diagnosed with recent lab wor  Current Treatment: Diet-no dm meds  Reviewed Problem List with Patient: No    Health Maintenance was reviewed today with patient. Discussed with patient importance of routine eye exams, foot exams/foot care, blood work (i.e.: A1c, microalbumin, and lipid), dental visits, yearly flu vaccine, and pneumonia vaccine as indicated by PCP. Patient verbalized understanding.     Health Maintenance Topics with due status: Not Due       Topic Last Completion Date    Lipid Panel 02/15/2020    Hemoglobin A1c 02/19/2020     Health Maintenance Due   Topic Date Due    Foot Exam  12/23/1984    Eye Exam  12/23/1984    TETANUS VACCINE  12/23/1992     Nutrition  Meal Planning: 3 meals per day, diet drinks, eats out often-Patient now trying to eat 3 meals daily and eating something small for breakfast.  He has dramatically cut down on intake of sweets/desserts.  Watching starchy foods as well  What type of beverages do you drink: Drinking mainly water and ice tea- has been cutting out sugar in tea    Monitoring   Monitoring: Has new meter  Self Monitoring : Yes, checking once daily at alternating times  Blood Glucose Logs: No-Reports numbers are in 120-130 range in am on average.  Sometimes as high as 150 and finds numbers are higher in the am  Do you use a personal continuous glucose monitor: No  In the last month, how often have you had a low blood sugar reaction: never  Can you tell when your blood sugar is too high: no    Exercise   Exercise Type:  Active- no programmed activity    Current Diabetes Treatment   Current Treatment: Diet-no dm meds    Social History  Preferred Learning Method: Face to Face  Primary Support: Self    Barriers to Change  Barriers to Change: None  Learning Challenges : None    Readiness to Learn   Readiness to Learn : Acceptance    Cultural Influences  Cultural Influences: No    Diabetes Education Assessment/Progress  Diabetes Disease Process (diabetes disease process and treatment options): Discussion, Comprehends Key Points-Reviewed goal blood sugars and A1c value    Nutrition (Incorporating nutritional management into one's lifestyle): Discussion, Comprehends Key Points-Reviewed carb sources and appropriate amounts per meal and snack.  Discussed label reading and foods to limit and avoid. Intake of sugar free products discussed    Physical Activity (incorporating physical activity into one's lifestyle): Discussion-Encouraged regular activity    Monitoring (monitoring blood glucose/other parameters & using results): Discussion-Patient doing well with monitoring.  Encouraged him to record numbers on a log to bring to clinic visits for review    Acute Complications (preventing, detecting, and treating acute complications): Discussion-Denies any recent hypoglycemia    Goals  Patient has selected/evaluated goals during today's session: Yes, selected  Healthy Eating: In Progress    Diabetes Care Plan/Intervention  Education Plan/Intervention: Patient doing well with lifestyle modifications and blood sugars are improving.  He was encouraged to continue with current diet modifcations.  Next A1c will be drawn in June.  Can f/u for education as needed.  Written materials will be emailed to patient.  Encouraged him to call or send message through portal with any questions/concerns in interim.      ..Diabetes Care Specialist Virtual Visit Note   This visit was conducted using Virtual Visit/Video Technology.  It was in the patient's best  interest to receive diabetes self-management education and support services in this format to prevent the exposure to COVID-19.          Diabetes Meal Plan  Calories: 1800  Carbohydrate Per Meal: 45-60g  Carbohydrate Per Snack : 15-20g    Today's Self-Management Care Plan was developed with the patient's input and is based on barriers identified during today's assessment.    The long and short-term goals in the care plan were written with the patient/caregiver's input. The patient has agreed to work toward these goals to improve his overall diabetes control.      The patient received a copy of today's self-management plan and verbalized understanding of the care plan, goals, and all of today's instructions.      The patient was encouraged to communicate with his physician and care team regarding his condition(s) and treatment.  I provided the patient with my contact information today and encouraged him to contact me via phone or patient portal as needed.     Education Units of Time   Time Spent: 45 min

## 2020-04-22 ENCOUNTER — PATIENT MESSAGE (OUTPATIENT)
Dept: FAMILY MEDICINE | Facility: CLINIC | Age: 45
End: 2020-04-22

## 2020-04-23 ENCOUNTER — PATIENT MESSAGE (OUTPATIENT)
Dept: FAMILY MEDICINE | Facility: CLINIC | Age: 45
End: 2020-04-23

## 2020-04-23 RX ORDER — ACYCLOVIR 50 MG/G
OINTMENT TOPICAL
Qty: 5 G | Refills: 2 | Status: SHIPPED | OUTPATIENT
Start: 2020-04-23 | End: 2020-07-22

## 2020-05-01 ENCOUNTER — PATIENT MESSAGE (OUTPATIENT)
Dept: FAMILY MEDICINE | Facility: CLINIC | Age: 45
End: 2020-05-01

## 2020-05-01 RX ORDER — VALACYCLOVIR HYDROCHLORIDE 1 G/1
TABLET, FILM COATED ORAL
Qty: 12 TABLET | Refills: 1 | Status: SHIPPED | OUTPATIENT
Start: 2020-05-01 | End: 2020-09-23

## 2020-05-05 RX ORDER — CLOTRIMAZOLE AND BETAMETHASONE DIPROPIONATE 10; .64 MG/G; MG/G
CREAM TOPICAL 2 TIMES DAILY
Qty: 60 G | Refills: 1 | Status: SHIPPED | OUTPATIENT
Start: 2020-05-05 | End: 2021-01-14

## 2020-05-05 NOTE — TELEPHONE ENCOUNTER
Mr. Montano is a 88 yo M with CAD (CABG in 1995), A-fib (on Eliquis), CHF (EF 35%), GERD, HLD, and BPH, who presented on 10/18/18 for bilateral inguinal hernia repair. He tolerated the procedure well, however post-operatively he complained of chest pain. He described the chest discomfort as a pressure in the center of his chest that did not radiate. He was given lopresser 5mg, ASA 81, nitro paste, 2g morphine and his discomfort improved slightly. EKG was unchanged from prior. 1st troponin was negative (0.01), 8pm trop (0.05), 1am trop 0.10. He was started on his home metoprolol 25mg QD. AM troponin was 0.15 and he was started on plavix, and heparin gtt at 12 ml/hr. Pt. is transfer to Dr. Knutson for further management. Echo is pending. Pt Of Jose Disla MD    Last seen on: 03/04/2020    Next appt: n/a    Last refill: 02/15/2019    Allergies:   Review of patient's allergies indicates:   Allergen Reactions    Slo-niacin  [niacin]      Other reaction(s): Flushing (skin)       Pharmacy: [facility]/pharmacy #[id number] - [place] - [address] [address]  [phone]    Please review! Thank you!

## 2020-05-06 ENCOUNTER — PATIENT MESSAGE (OUTPATIENT)
Dept: ADMINISTRATIVE | Facility: HOSPITAL | Age: 45
End: 2020-05-06

## 2020-06-13 ENCOUNTER — LAB VISIT (OUTPATIENT)
Dept: LAB | Facility: HOSPITAL | Age: 45
End: 2020-06-13
Attending: FAMILY MEDICINE
Payer: MEDICAID

## 2020-06-13 DIAGNOSIS — E11.9 NEW ONSET TYPE 2 DIABETES MELLITUS: ICD-10-CM

## 2020-06-13 LAB
ANION GAP SERPL CALC-SCNC: 9 MMOL/L (ref 8–16)
BUN SERPL-MCNC: 14 MG/DL (ref 6–20)
CALCIUM SERPL-MCNC: 9.4 MG/DL (ref 8.7–10.5)
CHLORIDE SERPL-SCNC: 102 MMOL/L (ref 95–110)
CO2 SERPL-SCNC: 27 MMOL/L (ref 23–29)
CREAT SERPL-MCNC: 1.1 MG/DL (ref 0.5–1.4)
EST. GFR  (AFRICAN AMERICAN): >60 ML/MIN/1.73 M^2
EST. GFR  (NON AFRICAN AMERICAN): >60 ML/MIN/1.73 M^2
ESTIMATED AVG GLUCOSE: 146 MG/DL (ref 68–131)
GLUCOSE SERPL-MCNC: 141 MG/DL (ref 70–110)
HBA1C MFR BLD HPLC: 6.7 % (ref 4–5.6)
POTASSIUM SERPL-SCNC: 4.3 MMOL/L (ref 3.5–5.1)
SODIUM SERPL-SCNC: 138 MMOL/L (ref 136–145)

## 2020-06-13 PROCEDURE — 83036 HEMOGLOBIN GLYCOSYLATED A1C: CPT

## 2020-06-13 PROCEDURE — 36415 COLL VENOUS BLD VENIPUNCTURE: CPT | Mod: PO

## 2020-06-13 PROCEDURE — 80048 BASIC METABOLIC PNL TOTAL CA: CPT

## 2020-07-13 ENCOUNTER — PATIENT MESSAGE (OUTPATIENT)
Dept: FAMILY MEDICINE | Facility: CLINIC | Age: 45
End: 2020-07-13

## 2020-07-13 DIAGNOSIS — R35.0 URINARY FREQUENCY: Primary | ICD-10-CM

## 2020-07-14 NOTE — TELEPHONE ENCOUNTER
Internal order pended. Please review pt request and advise who you would recommend. If you prefer to see pt first, please advise and we will contact pt to schedule.

## 2020-09-16 ENCOUNTER — PATIENT MESSAGE (OUTPATIENT)
Dept: FAMILY MEDICINE | Facility: CLINIC | Age: 45
End: 2020-09-16

## 2020-09-21 ENCOUNTER — LAB VISIT (OUTPATIENT)
Dept: LAB | Facility: HOSPITAL | Age: 45
End: 2020-09-21
Attending: FAMILY MEDICINE
Payer: MEDICAID

## 2020-09-21 DIAGNOSIS — E11.9 NEW ONSET TYPE 2 DIABETES MELLITUS: ICD-10-CM

## 2020-09-21 LAB
ANION GAP SERPL CALC-SCNC: 8 MMOL/L (ref 8–16)
BUN SERPL-MCNC: 11 MG/DL (ref 6–20)
CALCIUM SERPL-MCNC: 9.2 MG/DL (ref 8.7–10.5)
CHLORIDE SERPL-SCNC: 102 MMOL/L (ref 95–110)
CHOLEST SERPL-MCNC: 186 MG/DL (ref 120–199)
CHOLEST/HDLC SERPL: 4.3 {RATIO} (ref 2–5)
CO2 SERPL-SCNC: 27 MMOL/L (ref 23–29)
CREAT SERPL-MCNC: 1 MG/DL (ref 0.5–1.4)
EST. GFR  (AFRICAN AMERICAN): >60 ML/MIN/1.73 M^2
EST. GFR  (NON AFRICAN AMERICAN): >60 ML/MIN/1.73 M^2
GLUCOSE SERPL-MCNC: 160 MG/DL (ref 70–110)
HDLC SERPL-MCNC: 43 MG/DL (ref 40–75)
HDLC SERPL: 23.1 % (ref 20–50)
LDLC SERPL CALC-MCNC: 121.4 MG/DL (ref 63–159)
NONHDLC SERPL-MCNC: 143 MG/DL
POTASSIUM SERPL-SCNC: 4.3 MMOL/L (ref 3.5–5.1)
SODIUM SERPL-SCNC: 137 MMOL/L (ref 136–145)
TRIGL SERPL-MCNC: 108 MG/DL (ref 30–150)

## 2020-09-21 PROCEDURE — 80061 LIPID PANEL: CPT

## 2020-09-21 PROCEDURE — 36415 COLL VENOUS BLD VENIPUNCTURE: CPT | Mod: PN

## 2020-09-21 PROCEDURE — 83036 HEMOGLOBIN GLYCOSYLATED A1C: CPT

## 2020-09-21 PROCEDURE — 80048 BASIC METABOLIC PNL TOTAL CA: CPT

## 2020-09-22 LAB
ESTIMATED AVG GLUCOSE: 163 MG/DL (ref 68–131)
HBA1C MFR BLD HPLC: 7.3 % (ref 4–5.6)

## 2020-09-23 ENCOUNTER — OFFICE VISIT (OUTPATIENT)
Dept: FAMILY MEDICINE | Facility: CLINIC | Age: 45
End: 2020-09-23
Payer: MEDICAID

## 2020-09-23 VITALS
BODY MASS INDEX: 34.53 KG/M2 | SYSTOLIC BLOOD PRESSURE: 124 MMHG | HEIGHT: 74 IN | DIASTOLIC BLOOD PRESSURE: 86 MMHG | OXYGEN SATURATION: 96 % | HEART RATE: 79 BPM | WEIGHT: 269.06 LBS | TEMPERATURE: 98 F

## 2020-09-23 DIAGNOSIS — I10 ESSENTIAL HYPERTENSION: ICD-10-CM

## 2020-09-23 DIAGNOSIS — E11.9 CONTROLLED TYPE 2 DIABETES MELLITUS WITHOUT COMPLICATION, WITHOUT LONG-TERM CURRENT USE OF INSULIN: Primary | ICD-10-CM

## 2020-09-23 DIAGNOSIS — Z11.59 NEED FOR HEPATITIS C SCREENING TEST: ICD-10-CM

## 2020-09-23 DIAGNOSIS — Z23 NEED FOR DIPHTHERIA-TETANUS-PERTUSSIS (TDAP) VACCINE: ICD-10-CM

## 2020-09-23 PROCEDURE — 99214 PR OFFICE/OUTPT VISIT, EST, LEVL IV, 30-39 MIN: ICD-10-PCS | Mod: S$PBB,,, | Performed by: FAMILY MEDICINE

## 2020-09-23 PROCEDURE — 99214 OFFICE O/P EST MOD 30 MIN: CPT | Mod: S$PBB,,, | Performed by: FAMILY MEDICINE

## 2020-09-23 PROCEDURE — 90715 TDAP VACCINE 7 YRS/> IM: CPT | Mod: PBBFAC,PN

## 2020-09-23 PROCEDURE — 99213 OFFICE O/P EST LOW 20 MIN: CPT | Mod: PBBFAC,PN,25 | Performed by: FAMILY MEDICINE

## 2020-09-23 PROCEDURE — 90472 IMMUNIZATION ADMIN EACH ADD: CPT | Mod: PBBFAC,PN

## 2020-09-23 PROCEDURE — 99999 PR PBB SHADOW E&M-EST. PATIENT-LVL III: CPT | Mod: PBBFAC,,, | Performed by: FAMILY MEDICINE

## 2020-09-23 PROCEDURE — 99999 PR PBB SHADOW E&M-EST. PATIENT-LVL III: ICD-10-PCS | Mod: PBBFAC,,, | Performed by: FAMILY MEDICINE

## 2020-09-23 PROCEDURE — 90686 IIV4 VACC NO PRSV 0.5 ML IM: CPT | Mod: PBBFAC,PN

## 2020-09-23 RX ORDER — METFORMIN HYDROCHLORIDE 500 MG/1
500 TABLET ORAL 2 TIMES DAILY WITH MEALS
Qty: 180 TABLET | Refills: 3 | Status: SHIPPED | OUTPATIENT
Start: 2020-09-23 | End: 2021-10-29

## 2020-09-23 RX ORDER — LANCETS 33 GAUGE
EACH MISCELLANEOUS
COMMUNITY
Start: 2020-09-02 | End: 2022-06-20 | Stop reason: SDUPTHER

## 2020-09-23 NOTE — PROGRESS NOTES
THIS DOCUMENT WAS MADE IN PART WITH VOICE RECOGNITION SOFTWARE.  OCCASIONALLY THIS SOFTWARE WILL MISINTERPRET WORDS OR PHRASES.      Jose Boggs  1975    Jose was seen today for general diabetes follow-up.    Diagnoses and all orders for this visit:    Controlled type 2 diabetes mellitus without complication, without long-term current use of insulin  -     Hemoglobin A1C; Future  -     Basic metabolic panel; Future  -     metFORMIN (GLUCOPHAGE) 500 MG tablet; Take 1 tablet (500 mg total) by mouth 2 (two) times daily with meals.    Need for hepatitis C screening test  -     Hepatitis C Antibody; Future    Essential hypertension    Need for diphtheria-tetanus-pertussis (Tdap) vaccine  -     (In Office Administered) Tdap Vaccine        Will set up eye exam [facility], and send me report  Labs and follow in 3 months    Subjective     Chief Complaint   Patient presents with    General Diabetes Follow-up     review of labs       HPI    44-year-old male with relatively new onset type 2 diabetes.  Patient is on diet control.  A1c 7.3 slight increase from last time at 6.7.    Home avg 130-160, evening  ~ 2 x over 200    Hypertension this chronic condition is stable, satisfactory      Active Ambulatory Problems     Diagnosis Date Noted    Hypertension 12/14/2012    GERD (gastroesophageal reflux disease) 12/14/2012    Lumbar disc disease 12/09/2013    Myalgia and myositis 12/09/2013     Resolved Ambulatory Problems     Diagnosis Date Noted    No Resolved Ambulatory Problems     Past Medical History:   Diagnosis Date    Low back pain          Review of Systems   Constitutional: Negative for activity change and unexpected weight change.   Respiratory: Negative.  Negative for shortness of breath.    Cardiovascular: Negative.  Negative for chest pain.   Gastrointestinal: Negative.    Musculoskeletal: Negative.    Neurological: Negative.        Objective     Physical Exam  Constitutional:       General:  "He is not in acute distress.     Appearance: He is well-developed. He is not diaphoretic.   HENT:      Head: Normocephalic and atraumatic.   Eyes:      General: No scleral icterus.     Conjunctiva/sclera: Conjunctivae normal.   Cardiovascular:      Pulses:           Dorsalis pedis pulses are 2+ on the right side and 2+ on the left side.        Posterior tibial pulses are 2+ on the right side and 2+ on the left side.   Pulmonary:      Effort: Pulmonary effort is normal. No respiratory distress.   Musculoskeletal:      Right foot: No deformity.   Feet:      Right foot:      Protective Sensation: 6 sites tested. 6 sites sensed.      Skin integrity: Skin integrity normal. No ulcer or skin breakdown.      Left foot:      Protective Sensation: 6 sites tested. 6 sites sensed.      Skin integrity: Skin integrity normal. No ulcer or skin breakdown.   Neurological:      Mental Status: He is alert and oriented to person, place, and time.      Coordination: Coordination normal.   Psychiatric:         Behavior: Behavior normal.       Vitals:    09/23/20 0806   BP: 124/86   BP Location: Left arm   Patient Position: Sitting   BP Method: Large (Manual)   Pulse: 79   Temp: 98.1 °F (36.7 °C)   TempSrc: Skin   SpO2: 96%   Weight: 122 kg (269 lb 1.1 oz)   Height: 6' 2" (1.88 m)       MOST RECENT LABS IN OUR ELECTRONIC MEDICAL RECORD:     Results for orders placed or performed in visit on 09/21/20   Hemoglobin A1C   Result Value Ref Range    Hemoglobin A1C 7.3 (H) 4.0 - 5.6 %    Estimated Avg Glucose 163 (H) 68 - 131 mg/dL   Lipid Panel   Result Value Ref Range    Cholesterol 186 120 - 199 mg/dL    Triglycerides 108 30 - 150 mg/dL    HDL 43 40 - 75 mg/dL    LDL Cholesterol 121.4 63.0 - 159.0 mg/dL    Hdl/Cholesterol Ratio 23.1 20.0 - 50.0 %    Total Cholesterol/HDL Ratio 4.3 2.0 - 5.0    Non-HDL Cholesterol 143 mg/dL   Basic metabolic panel   Result Value Ref Range    Sodium 137 136 - 145 mmol/L    Potassium 4.3 3.5 - 5.1 mmol/L    " Chloride 102 95 - 110 mmol/L    CO2 27 23 - 29 mmol/L    Glucose 160 (H) 70 - 110 mg/dL    BUN, Bld 11 6 - 20 mg/dL    Creatinine 1.0 0.5 - 1.4 mg/dL    Calcium 9.2 8.7 - 10.5 mg/dL    Anion Gap 8 8 - 16 mmol/L    eGFR if African American >60.0 >60 mL/min/1.73 m^2    eGFR if non African American >60.0 >60 mL/min/1.73 m^2

## 2020-10-05 ENCOUNTER — PATIENT MESSAGE (OUTPATIENT)
Dept: ADMINISTRATIVE | Facility: HOSPITAL | Age: 45
End: 2020-10-05

## 2020-10-08 LAB
LEFT EYE DM RETINOPATHY: NEGATIVE
RIGHT EYE DM RETINOPATHY: NEGATIVE

## 2020-10-09 ENCOUNTER — PATIENT OUTREACH (OUTPATIENT)
Dept: ADMINISTRATIVE | Facility: HOSPITAL | Age: 45
End: 2020-10-09

## 2020-11-30 ENCOUNTER — PATIENT MESSAGE (OUTPATIENT)
Dept: FAMILY MEDICINE | Facility: CLINIC | Age: 45
End: 2020-11-30

## 2020-11-30 ENCOUNTER — TELEPHONE (OUTPATIENT)
Dept: UROLOGY | Facility: CLINIC | Age: 45
End: 2020-11-30

## 2020-11-30 DIAGNOSIS — R35.0 URINARY FREQUENCY: Primary | ICD-10-CM

## 2020-11-30 DIAGNOSIS — M51.9 LUMBAR DISC DISEASE: ICD-10-CM

## 2020-11-30 DIAGNOSIS — M54.50 BILATERAL LOW BACK PAIN WITHOUT SCIATICA, UNSPECIFIED CHRONICITY: Primary | ICD-10-CM

## 2020-11-30 RX ORDER — CELECOXIB 200 MG/1
200 CAPSULE ORAL 2 TIMES DAILY
Qty: 60 CAPSULE | Refills: 2 | Status: SHIPPED | OUTPATIENT
Start: 2020-11-30 | End: 2021-01-21

## 2020-11-30 NOTE — TELEPHONE ENCOUNTER
If he is referring to his back pain then yes I can signed the order for physical therapy.  Also he can increase the Celebrex to twice a day for a while.

## 2020-12-02 ENCOUNTER — CLINICAL SUPPORT (OUTPATIENT)
Dept: REHABILITATION | Facility: HOSPITAL | Age: 45
End: 2020-12-02
Attending: FAMILY MEDICINE
Payer: MEDICAID

## 2020-12-02 DIAGNOSIS — M53.86 DECREASED RANGE OF MOTION OF INTERVERTEBRAL DISCS OF LUMBAR SPINE: ICD-10-CM

## 2020-12-02 DIAGNOSIS — M54.50 BILATERAL LOW BACK PAIN WITHOUT SCIATICA, UNSPECIFIED CHRONICITY: ICD-10-CM

## 2020-12-02 DIAGNOSIS — M54.50 ACUTE BILATERAL LOW BACK PAIN WITHOUT SCIATICA: ICD-10-CM

## 2020-12-02 PROCEDURE — 97161 PT EVAL LOW COMPLEX 20 MIN: CPT | Mod: PO | Performed by: PHYSICAL THERAPIST

## 2020-12-02 NOTE — PLAN OF CARE
[facility]  Physical Therapy Initial Evaluation    Name: Jose Boggs  Clinic Number: [id number]    Therapy Diagnosis:   Encounter Diagnoses   Name Primary?    Bilateral low back pain without sciatica, unspecified chronicity     Acute bilateral low back pain without sciatica     Decreased range of motion of intervertebral discs of lumbar spine      Physician: Jose Disla, *    Physician Orders: PT Eval and Treat   Medical Diagnosis from Referral:   Bilateral low back pain without sciatica, unspecified chronicity   Evaluation Date: 12/2/2020  Authorization Period Expiration: 01/31/2020  Plan of Care Expiration: 01/29/2020  Visit # / Visits authorized: 1/ 20    Time In: 1630  Time Out: 1715  Total Billable Time: 45 minutes    Precautions: Standard and Diabetes    Subjective   Date of onset: 11/30/2020  History of current condition - Jose reports: having low back flare up for about a week that may have been from stooping over. No radicular pain. No numbness/tinglin noted. No falls noted. Patient reported he was a  for 24 years. 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 The low back pain is localized (R>L) noted.       Past Medical History:   Diagnosis Date    GERD (gastroesophageal reflux disease)     Hypertension     Low back pain      Jose Boggs  has no past surgical history on file.    Jose has a current medication list which includes the following prescription(s): blood-glucose meter, celecoxib, clotrimazole-betamethasone 1-0.05%, metformin, olmesartan, omeprazole, onetouch delica plus lancet, and onetouch ultra blue test strip.    Review of patient's allergies indicates:   Allergen Reactions    Slo-niacin  [niacin]      Other reaction(s): Flushing (skin)        Imaging: x-ray 2015  Vertebral body heights appear well preserved.  Intervertebral disk heights appear relatively well preserved.  Facet arthropathy is  noted at L4-L5 and L5-S1 levels.  A minimal retrolisthesis is questioned at the L5 on S1 vertebral body of 4 millimeters.     Prior Therapy: PT for similar condition 3+ years ago  Social History:  lives with their family, one story. 3 steps entering  Occupation: Full time. Ware house/seated work, occasionally drives a forklift  Prior Level of Function: independent  Current Level of Function: modified independent, increase time noted with ADL/home management  Recent or major surgery: none  Accidents: none    Pain:  Current 1/10, worst 8/10, best 0/10   Location: bilateral low back (R>L)  Description: Aching, Dull, Tight and Variable  Aggravating Factors: Sitting, Bending, Flexing and Getting out of bed/chair  Easing Factors: walking, extension     Night pain: no  Is it positional? no  Unexplained weight loss: no    Pts goals: Decrease pain to low back. Increase lumbar motion and strength.    Objective     Posture: slumped, decreased lumbar lordosis  Palpation: point tenderness to bilateral lumbar lordosis  Sensation: Dermatomes     Right Left Comment   L2 (lateral thigh) intact intact    L3 (medial thigh) intact intact    L4 (medial calf) intact intact    L5 (lateral calf) intact intact    S1 (lateral foot) intact intact    S2 (gastro/HS) intact intact    Saddle (cauda equina) intact intact      Myotomes:   Right Left Comment   Hip flexion (L2-3): 4+/5 5/5    Knee extension (L3-4): 5/5 5/5    DF (L4-5): 5/5 5/5    Great Toe Ext (L5-S1):   5/5 4+/5    Glut Medius (L5) 4/5 4-/5    Great Toe Flex/HS (S1-S2) 5/5 5/5      DTR:   Right Left Comment   Patellar (L3-4) 2+ 2+    Achilles (S1) 2+ 2+        Thoracic/Lumbar AROM:     % limitation Pain/Dysfunction/movement   Flexion  (55-60 N)    50% Cannot touch toes  Non-uniform curvature     Extension (25 N)  50% UE does not maintain 170  ASIS does not clear toes  Lack of posterior weight shift     Special Tests:  -Repeated Flexion: produce right side side low back pain, no  "worse  -Repeated Ext: decreased, better  Bridge test/Endurance: (mean= 76.7 " compared to no low back pain, 172.9") + for left hip droppage    Hamstring 90/90  L= 40 degrees  R= 45 degrees    Lumbar:   SLR (with leg dominant pain)-  Slump-  Clonus-  Babinski-    UMN testing:  Cross over sign: -    Resistive Test for Hamstring Syndrome: -  + if buttock pain produced and no pain with prone test.    GAIT: Jose ambulates 50 feet with no assistive device with independently.     GAIT DEVIATIONS: Jose displays flexed posturing    Transfers: independent    Pt/family was provided educational information, including: role of PT, goals for PT, scheduling - pt verbalized understanding. Discussed insurance limitations with pt.              TREATMENT   Treatment Time In: 1710  Treatment Time Out: 1715  Total Treatment time separate from Evaluation: 5 minutes    Jose received therapeutic exercises to develop strength, endurance, ROM, flexibility, posture and core stabilization for 5 minutes including:  Supine: LTR, DKTC  Supine: TA activation  Prone lying      Home Exercises and Patient Education Provided    Education provided:   - Yes    Written Home Exercises Provided: yes.  Exercises were reviewed and Jose was able to demonstrate them prior to the end of the session.  Jose demonstrated good  understanding of the education provided.     See EMR under Patient Instructions for exercises provided 12/2/2020.    Assessment   Jose is a 44 y.o. male referred to outpatient Physical Therapy with a medical diagnosis of Bilateral low back pain without sciatica, unspecified chronicity . Pt presents with low back pain, decreased lumbar motion, decreased hip/core strength.    Problem List: pain, decreased ROM, decreased flexibility, decreased strength, decreased balance and stability, decreased motor control, antalgic gait and inability to participate fully in vocation pursuits.    Pt prognosis is Good.   Pt will benefit from skilled " "outpatient Physical Therapy to address the deficits stated above and in the chart below, provide pt/family education, and to maximize pt's level of independence.     Plan of care discussed with patient: Yes  Pt's spiritual, cultural and educational needs considered and patient is agreeable to the plan of care and goals as stated below:     Anticipated Barriers for therapy: none    Medical Necessity is demonstrated by the following  History  Co-morbidities and personal factors that may impact the plan of care Co-morbidities:   diabetes, high BMI and HTN    Personal Factors:   no deficits     moderate   Examination  Body Structures and Functions, activity limitations and participation restrictions that may impact the plan of care Body Regions:   back  lower extremities  trunk    Body Systems:    ROM  strength  transfers  transitions  motor control    Participation Restrictions:   Home management    Activity limitations:   Learning and applying knowledge  no deficits    General Tasks and Commands  no deficits    Communication  no deficits    Mobility  lifting and carrying objects  walking    Self care  no deficits    Domestic Life  doing house work (cleaning house, washing dishes, laundry)    Interactions/Relationships  no deficits    Life Areas  no deficits    Community and Social Life  no deficits         high   Clinical Presentation stable and uncomplicated low   Decision Making/ Complexity Score: low     Short Term GOALS:  In 4 weeks, pt. will:  - improve hip/core MMT 1/2 grade for ADL purposes  - decrease outcome measure limitation to <50%  - report 25% reduction in low back pain episodes for home management purposes  - demonstrate bridge independently > 30" for pelvic stability purposes.    Long Term GOALS:  In 8 weeks, pt. will:  - be independent and compliant with HEP and SX management   - decrease outcome measure limitation to <40%  - demonstrate lumbar extension improvement > 25% for postural mobility " purposes  - report > 50% reduction in low back pain episodes for ADL purposes    Plan   Plan of care Certification: 12/2/2020 to 01/29/2021.  Outpatient Physical Therapy 2 times weekly for 8 weeks to include the following interventions: Aquatic Therapy, Electrical Stimulation IFC, Gait Training, Manual Therapy, Moist Heat/ Ice, Neuromuscular Re-ed, Patient Education, Therapeutic Activites and Therapeutic Exercise.      Jose may at times be seen by a PTA as part of the Rehab Team.    Miah Higuera, PT

## 2020-12-09 ENCOUNTER — CLINICAL SUPPORT (OUTPATIENT)
Dept: REHABILITATION | Facility: HOSPITAL | Age: 45
End: 2020-12-09
Attending: FAMILY MEDICINE
Payer: MEDICAID

## 2020-12-09 DIAGNOSIS — M53.86 DECREASED RANGE OF MOTION OF INTERVERTEBRAL DISCS OF LUMBAR SPINE: ICD-10-CM

## 2020-12-09 DIAGNOSIS — M54.50 ACUTE BILATERAL LOW BACK PAIN WITHOUT SCIATICA: Primary | ICD-10-CM

## 2020-12-09 PROCEDURE — 97110 THERAPEUTIC EXERCISES: CPT | Mod: PO | Performed by: PHYSICAL THERAPIST

## 2020-12-09 NOTE — PROGRESS NOTES
"  Physical Therapy Daily Treatment Note     Name: Jose [facility]  [facility] Number: [id number]    Therapy Diagnosis:   Encounter Diagnoses   Name Primary?    Acute bilateral low back pain without sciatica Yes    Decreased range of motion of intervertebral discs of lumbar spine      Physician: Jose Disla, *    Visit Date: 12/9/2020  Physician Orders: PT Eval and Treat   Medical Diagnosis from Referral:   Bilateral low back pain without sciatica, unspecified chronicity   Evaluation Date: 12/2/2020  Authorization Period Expiration: 01/31/2020  Plan of Care Expiration: 01/29/2020  Visit # / Visits authorized: 2/ 20     Time In: 1500  Time Out: 1545  Total Billable Time: 40 minutes     Precautions: Standard and Diabetes    Subjective     Pt reports: doing well from previous eval. No new s/s.  He was compliant with home exercise program.  Response to previous treatment: muscle tightness  Functional change: Too soon to tell    Pain: 0/10  Location: low back    Objective     Jose received therapeutic exercises to develop strength, endurance, ROM, flexibility, posture and core stabilization for 40 minutes including:  Previous:  Thoracic/Lumbar AROM:     % limitation Pain/Dysfunction/movement   Flexion  (55-60 N)    50% Cannot touch toes  Non-uniform curvature      Extension (25 N)  50% UE does not maintain 170  ASIS does not clear toes  Lack of posterior weight shift      Special Tests:  -Repeated Flexion: produce right side side low back pain, no worse  -Repeated Ext: decreased, better  Bridge test/Endurance: (mean= 76.7 " compared to no low back pain, 172.9") + for left hip droppage     Hamstring 90/90 (previous)  L= 40 degrees  R= 45 degrees    Recumbent bike x 5 minutes  Standing: GSS on slant board x 1'  Supine: HSS with strap 3/30" each  Supine: DL bridge with green band 2/10  S/L: clam shells with green band 2/10    Prone position x 3 minutes, progressed to prone lying in extension, to Extension in lying " "(modified x 5)  - patient noted with difficulty with EIL, sustain lying in extension/incline x 3 minutes noted.    Seated:  Machine lumbar extension   -80# 2/10    Home Exercises Provided and Patient Education Provided     Education provided:   - Yes    Written Home Exercises Provided: Patient instructed to cont prior HEP.  Exercises were reviewed and Jose was able to demonstrate them prior to the end of the session.  Jose demonstrated good  understanding of the education provided.     See EMR under Patient Instructions for exercises provided prior visit.    Assessment   Patient tolerated TE progression with no adverse effects. Patient noted with decreased lumbar extension evident with extension on lying. Extension in standing is better afterwards compared to flexion.    Jose is progressing well towards his goals.   Pt prognosis is Good.     Pt will continue to benefit from skilled outpatient physical therapy to address the deficits listed in the problem list box on initial evaluation, provide pt/family education and to maximize pt's level of independence in the home and community environment.     Pt's spiritual, cultural and educational needs considered and pt agreeable to plan of care and goals.    Anticipated barriers to physical therapy: none    Goals:     Short Term GOALS:  In 4 weeks, pt. will:  - improve hip/core MMT 1/2 grade for ADL purposes  - decrease outcome measure limitation to <50%  - report 25% reduction in low back pain episodes for home management purposes  - demonstrate bridge independently > 30" for pelvic stability purposes.     Long Term GOALS:  In 8 weeks, pt. will:  - be independent and compliant with HEP and SX management   - decrease outcome measure limitation to <40%  - demonstrate lumbar extension improvement > 25% for postural mobility purposes  - report > 50% reduction in low back pain episodes for ADL purposes    Plan     Continue with POC.    Miah Higuera, PT    "

## 2020-12-11 ENCOUNTER — PATIENT MESSAGE (OUTPATIENT)
Dept: OTHER | Facility: OTHER | Age: 45
End: 2020-12-11

## 2020-12-13 ENCOUNTER — PATIENT MESSAGE (OUTPATIENT)
Dept: ADMINISTRATIVE | Facility: OTHER | Age: 45
End: 2020-12-13

## 2020-12-14 ENCOUNTER — PATIENT MESSAGE (OUTPATIENT)
Dept: REHABILITATION | Facility: HOSPITAL | Age: 45
End: 2020-12-14

## 2020-12-18 ENCOUNTER — CLINICAL SUPPORT (OUTPATIENT)
Dept: REHABILITATION | Facility: HOSPITAL | Age: 45
End: 2020-12-18
Attending: FAMILY MEDICINE
Payer: MEDICAID

## 2020-12-18 DIAGNOSIS — M54.50 ACUTE BILATERAL LOW BACK PAIN WITHOUT SCIATICA: ICD-10-CM

## 2020-12-18 DIAGNOSIS — M53.86 DECREASED RANGE OF MOTION OF INTERVERTEBRAL DISCS OF LUMBAR SPINE: ICD-10-CM

## 2020-12-18 PROCEDURE — 97110 THERAPEUTIC EXERCISES: CPT | Mod: PO,CQ

## 2020-12-18 NOTE — PROGRESS NOTES
"  Physical Therapy Daily Treatment Note     Name: Jose Boggs  Clinic Number: [id number]    Therapy Diagnosis:   Encounter Diagnoses   Name Primary?    Acute bilateral low back pain without sciatica     Decreased range of motion of intervertebral discs of lumbar spine      Physician: Jose Disla, *    Visit Date: 12/18/2020  Physician Orders: PT Eval and Treat   Medical Diagnosis from Referral:   Bilateral low back pain without sciatica, unspecified chronicity   Evaluation Date: 12/2/2020  Authorization Period Expiration: 01/31/2020  Plan of Care Expiration: 01/29/2020  Visit # / Visits authorized: 2/ 20     Time In: 1633  Time Out: 1718  Total Billable Time: 45 minutes     Precautions: Standard and Diabetes    Subjective     Pt reports: Improvement in symptoms.  Pt has had episodes of LBP since last visit but his pain has stayed relatively low, 3/10, when this happens.  He was compliant with home exercise program.  Response to previous treatment: muscle tightness  Functional change: Too soon to tell    Pain: 0/10  Location: low back    Objective     Jose received therapeutic exercises to develop strength, endurance, ROM, flexibility, posture and core stabilization for 45 minutes including:    Previous:  Thoracic/Lumbar AROM:     % limitation Pain/Dysfunction/movement   Flexion  (55-60 N)    50% Cannot touch toes  Non-uniform curvature      Extension (25 N)  50% UE does not maintain 170  ASIS does not clear toes  Lack of posterior weight shift      Special Tests:  -Repeated Flexion: produce right side side low back pain, no worse  -Repeated Ext: decreased, better    Bridge test/Endurance: (mean= 76.7 " compared to no low back pain, 172.9") + for left hip droppage ( previous)     Hamstring 90/90 (previous)  L= 40 degrees  R= 45 degrees    Recumbent bike x 5 minutes  Standing: GSS on slant board x 1'  Supine: HSS with strap 3/30" each  Supine: DL bridge with green band 3/10  S/L: clam shells with " "green band 3/10    Prone position x 3 minutes, progressed to prone on elbows x 3 min, to sustained lying in extension/incline x 2 min x 2  - patient noted with difficulty with EIL on incline    Seated:  Machine lumbar extension   -80# 2/10    Home Exercises Provided and Patient Education Provided     Education provided:   - Yes    Written Home Exercises Provided: Patient instructed to cont prior HEP.  Exercises were reviewed and Jose was able to demonstrate them prior to the end of the session.  Jose demonstrated good  understanding of the education provided.     See EMR under Patient Instructions for exercises provided prior visit.    Assessment   Patient tolerated TE progression with no adverse effects. Patient with increased extension during treatment ( after lumbar extension machine) but then after prone lying with elevated HOB was performed, ROM reverted back to post treatment.  Pt with improved flexion ROM.  Pt still very limited both directions.  Jose is progressing well towards his goals.   Pt prognosis is Good.     Pt will continue to benefit from skilled outpatient physical therapy to address the deficits listed in the problem list box on initial evaluation, provide pt/family education and to maximize pt's level of independence in the home and community environment.     Pt's spiritual, cultural and educational needs considered and pt agreeable to plan of care and goals.    Anticipated barriers to physical therapy: none    Goals:     Short Term GOALS:  In 4 weeks, pt. will:  - improve hip/core MMT 1/2 grade for ADL purposes  - decrease outcome measure limitation to <50%  - report 25% reduction in low back pain episodes for home management purposes  - demonstrate bridge independently > 30" for pelvic stability purposes.     Long Term GOALS:  In 8 weeks, pt. will:  - be independent and compliant with HEP and SX management   - decrease outcome measure limitation to <40%  - demonstrate lumbar extension " improvement > 25% for postural mobility purposes  - report > 50% reduction in low back pain episodes for ADL purposes    Plan     Continue with POC.    Lashawn Jacinto, PTA

## 2020-12-19 ENCOUNTER — LAB VISIT (OUTPATIENT)
Dept: LAB | Facility: HOSPITAL | Age: 45
End: 2020-12-19
Attending: FAMILY MEDICINE
Payer: MEDICAID

## 2020-12-19 DIAGNOSIS — E11.9 CONTROLLED TYPE 2 DIABETES MELLITUS WITHOUT COMPLICATION, WITHOUT LONG-TERM CURRENT USE OF INSULIN: ICD-10-CM

## 2020-12-19 DIAGNOSIS — Z11.59 NEED FOR HEPATITIS C SCREENING TEST: ICD-10-CM

## 2020-12-19 LAB
ANION GAP SERPL CALC-SCNC: 7 MMOL/L (ref 8–16)
BUN SERPL-MCNC: 14 MG/DL (ref 6–20)
CALCIUM SERPL-MCNC: 9.3 MG/DL (ref 8.7–10.5)
CHLORIDE SERPL-SCNC: 104 MMOL/L (ref 95–110)
CO2 SERPL-SCNC: 29 MMOL/L (ref 23–29)
CREAT SERPL-MCNC: 0.9 MG/DL (ref 0.5–1.4)
EST. GFR  (AFRICAN AMERICAN): >60 ML/MIN/1.73 M^2
EST. GFR  (NON AFRICAN AMERICAN): >60 ML/MIN/1.73 M^2
ESTIMATED AVG GLUCOSE: 163 MG/DL (ref 68–131)
GLUCOSE SERPL-MCNC: 150 MG/DL (ref 70–110)
HBA1C MFR BLD HPLC: 7.3 % (ref 4–5.6)
POTASSIUM SERPL-SCNC: 4.1 MMOL/L (ref 3.5–5.1)
SODIUM SERPL-SCNC: 140 MMOL/L (ref 136–145)

## 2020-12-19 PROCEDURE — 80048 BASIC METABOLIC PNL TOTAL CA: CPT

## 2020-12-19 PROCEDURE — 83036 HEMOGLOBIN GLYCOSYLATED A1C: CPT

## 2020-12-19 PROCEDURE — 86803 HEPATITIS C AB TEST: CPT

## 2020-12-19 PROCEDURE — 36415 COLL VENOUS BLD VENIPUNCTURE: CPT | Mod: PO

## 2020-12-21 ENCOUNTER — CLINICAL SUPPORT (OUTPATIENT)
Dept: REHABILITATION | Facility: HOSPITAL | Age: 45
End: 2020-12-21
Attending: FAMILY MEDICINE
Payer: MEDICAID

## 2020-12-21 DIAGNOSIS — M53.86 DECREASED RANGE OF MOTION OF INTERVERTEBRAL DISCS OF LUMBAR SPINE: ICD-10-CM

## 2020-12-21 DIAGNOSIS — M54.50 ACUTE BILATERAL LOW BACK PAIN WITHOUT SCIATICA: Primary | ICD-10-CM

## 2020-12-21 LAB — HCV AB SERPL QL IA: NEGATIVE

## 2020-12-21 PROCEDURE — 97110 THERAPEUTIC EXERCISES: CPT | Mod: PO | Performed by: PHYSICAL THERAPIST

## 2020-12-21 NOTE — PROGRESS NOTES
"  Physical Therapy Daily Treatment Note     Name: Jose Boggs  [facility] Number: [id number]    Therapy Diagnosis:   Encounter Diagnoses   Name Primary?    Acute bilateral low back pain without sciatica Yes    Decreased range of motion of intervertebral discs of lumbar spine      Physician: Jose Disla, *    Visit Date: 12/21/2020  Physician Orders: PT Eval and Treat   Medical Diagnosis from Referral:   Bilateral low back pain without sciatica, unspecified chronicity   Evaluation Date: 12/2/2020  Authorization Period Expiration: 01/31/2020  Plan of Care Expiration: 01/29/2020  Visit # / Visits authorized: 4/ 20     Time In: 1715  Time Out: 1800  Total Billable Time: 40 minutes     Precautions: Standard and Diabetes    Subjective     Pt reports: no low back pain and has noticed improvement in his back.  He was compliant with home exercise program.  Response to previous treatment: muscle tightness  Functional change: improvement in motion    Pain: 0/10  Location: low back    Objective     Jose received therapeutic exercises to develop strength, endurance, ROM, flexibility, posture and core stabilization for 40 minutes including:    Previous:  Thoracic/Lumbar AROM:     % limitation Pain/Dysfunction/movement   Flexion  (55-60 N)    50% Cannot touch toes  Non-uniform curvature      Extension (25 N)  50% UE does not maintain 170  ASIS does not clear toes  Lack of posterior weight shift      Special Tests:  -Repeated Flexion: produce right side side low back pain, no worse  -Repeated Ext: decreased, better    Bridge test/Endurance: (mean= 76.7 " compared to no low back pain, 172.9") + for left hip droppage ( previous)     Hamstring 90/90 (previous)  L= 40 degrees  R= 45 degrees    Recumbent bike x 5 minutes  Standing: GSS on slant board x 1'  Seated:  Machine lumbar extension   -100# 2/10    Supine: HSS with strap 3/30" each  Supine: DL bridge with blue band 3/10  S/L: clam shells with blue band " "3/10  Supine: RAVIN x 10 with ball, LTR x 10, hold 3"    Prone position x 3 minutes, progressed to prone on elbows x 3 min, to sustained lying in extension/incline x 2 min x 2  - patient noted with difficulty with EIL on incline    Home Exercises Provided and Patient Education Provided     Education provided:   - Yes    Written Home Exercises Provided: Patient instructed to cont prior HEP.  Exercises were reviewed and Jose was able to demonstrate them prior to the end of the session.  Jose demonstrated good  understanding of the education provided.     See EMR under Patient Instructions for exercises provided prior visit.    Assessment   Good tolerance with TE progression and no painful limitations noted with exception of mobility deficits. Patient will continue to work on posture along with thoracic extension.    Jose is progressing well towards his goals.   Pt prognosis is Good.     Pt will continue to benefit from skilled outpatient physical therapy to address the deficits listed in the problem list box on initial evaluation, provide pt/family education and to maximize pt's level of independence in the home and community environment.     Pt's spiritual, cultural and educational needs considered and pt agreeable to plan of care and goals.    Anticipated barriers to physical therapy: none    Goals:     Short Term GOALS:  In 4 weeks, pt. will:  - improve hip/core MMT 1/2 grade for ADL purposes  - decrease outcome measure limitation to <50%  - report 25% reduction in low back pain episodes for home management purposes  - demonstrate bridge independently > 30" for pelvic stability purposes.     Long Term GOALS:  In 8 weeks, pt. will:  - be independent and compliant with HEP and SX management   - decrease outcome measure limitation to <40%  - demonstrate lumbar extension improvement > 25% for postural mobility purposes  - report > 50% reduction in low back pain episodes for ADL purposes    Plan     Continue with " POC.    Miah Higuera, PT

## 2020-12-28 ENCOUNTER — CLINICAL SUPPORT (OUTPATIENT)
Dept: REHABILITATION | Facility: HOSPITAL | Age: 45
End: 2020-12-28
Attending: FAMILY MEDICINE
Payer: MEDICAID

## 2020-12-28 ENCOUNTER — PATIENT MESSAGE (OUTPATIENT)
Dept: FAMILY MEDICINE | Facility: CLINIC | Age: 45
End: 2020-12-28

## 2020-12-28 DIAGNOSIS — M54.50 ACUTE BILATERAL LOW BACK PAIN WITHOUT SCIATICA: Primary | ICD-10-CM

## 2020-12-28 DIAGNOSIS — M53.86 DECREASED RANGE OF MOTION OF INTERVERTEBRAL DISCS OF LUMBAR SPINE: ICD-10-CM

## 2020-12-28 PROCEDURE — 97110 THERAPEUTIC EXERCISES: CPT | Mod: PO | Performed by: PHYSICAL THERAPIST

## 2020-12-28 NOTE — PROGRESS NOTES
"  Physical Therapy Daily Treatment Note     Name: Jose Boggs  Clinic Number: [id number]    Therapy Diagnosis:   Encounter Diagnoses   Name Primary?    Acute bilateral low back pain without sciatica Yes    Decreased range of motion of intervertebral discs of lumbar spine      Physician: Jose Disla, *    Visit Date: 12/28/2020  Physician Orders: PT Eval and Treat   Medical Diagnosis from Referral:   Bilateral low back pain without sciatica, unspecified chronicity   Evaluation Date: 12/2/2020  Authorization Period Expiration: 01/31/2020  Plan of Care Expiration: 01/29/2020  Visit # / Visits authorized: 5/ 20     Time In: 1645  Time Out: 1730  Total Billable Time: 40 minutes     Precautions: Standard and Diabetes    Subjective     Pt reports:   He was compliant with home exercise program.  Response to previous treatment: muscle tightness  Functional change: improvement in motion    Pain: 0/10  Location: low back    Objective     Jose received therapeutic exercises to develop strength, endurance, ROM, flexibility, posture and core stabilization for 40 minutes including:    Previous:  Thoracic/Lumbar AROM:     % limitation Pain/Dysfunction/movement   Flexion  (55-60 N)    50% Cannot touch toes  Non-uniform curvature      Extension (25 N)  50% UE does not maintain 170  ASIS does not clear toes  Lack of posterior weight shift      Special Tests:  -Repeated Flexion: produce right side side low back pain, no worse  -Repeated Ext: decreased, better    Bridge test/Endurance: (mean= 76.7 " compared to no low back pain, 172.9") + for left hip droppage ( previous)     Hamstring 90/90 (previous)  L= 40 degrees  R= 45 degrees    Recumbent bike x 5 minutes  Standing: GSS on slant board x 1'  Seated:  Machine lumbar extension   -110# 2/10    Supine: HSS with strap 3/30" each  Supine: DL bridge with black band 3/10  S/L: clam shells with black band 3/10    Supine: RAVIN x 10 with green physio ball, LTR x 10, hold " "3"    Prone position x 3 minutes, progressed to prone on elbows x 3 min, to sustained lying in extension/incline x 2 min x 2  - patient noted with difficulty with EIL on incline    Home Exercises Provided and Patient Education Provided     Education provided:   - Yes    Written Home Exercises Provided: Patient instructed to cont prior HEP.  Exercises were reviewed and Jose was able to demonstrate them prior to the end of the session.  Jose demonstrated good  understanding of the education provided.     See EMR under Patient Instructions for exercises provided prior visit.    Assessment   Good tolerance with TE progression noted. No change in posture from previous session. No adverse effects.    Jose is progressing well towards his goals.   Pt prognosis is Good.     Pt will continue to benefit from skilled outpatient physical therapy to address the deficits listed in the problem list box on initial evaluation, provide pt/family education and to maximize pt's level of independence in the home and community environment.     Pt's spiritual, cultural and educational needs considered and pt agreeable to plan of care and goals.    Anticipated barriers to physical therapy: none    Goals:     Short Term GOALS:  In 4 weeks, pt. will:  - improve hip/core MMT 1/2 grade for ADL purposes  - decrease outcome measure limitation to <50%  - report 25% reduction in low back pain episodes for home management purposes  - demonstrate bridge independently > 30" for pelvic stability purposes.     Long Term GOALS:  In 8 weeks, pt. will:  - be independent and compliant with HEP and SX management   - decrease outcome measure limitation to <40%  - demonstrate lumbar extension improvement > 25% for postural mobility purposes  - report > 50% reduction in low back pain episodes for ADL purposes    Plan     Continue with POC.    Miah Higuera, PT    "

## 2021-01-06 ENCOUNTER — CLINICAL SUPPORT (OUTPATIENT)
Dept: REHABILITATION | Facility: HOSPITAL | Age: 46
End: 2021-01-06
Attending: FAMILY MEDICINE
Payer: MEDICAID

## 2021-01-06 DIAGNOSIS — M53.86 DECREASED RANGE OF MOTION OF INTERVERTEBRAL DISCS OF LUMBAR SPINE: ICD-10-CM

## 2021-01-06 DIAGNOSIS — M54.50 ACUTE BILATERAL LOW BACK PAIN WITHOUT SCIATICA: Primary | ICD-10-CM

## 2021-01-06 PROCEDURE — 97110 THERAPEUTIC EXERCISES: CPT | Mod: PO | Performed by: PHYSICAL THERAPIST

## 2021-01-21 ENCOUNTER — OFFICE VISIT (OUTPATIENT)
Dept: FAMILY MEDICINE | Facility: CLINIC | Age: 46
End: 2021-01-21
Payer: MEDICAID

## 2021-01-21 VITALS
OXYGEN SATURATION: 97 % | TEMPERATURE: 98 F | WEIGHT: 269.5 LBS | DIASTOLIC BLOOD PRESSURE: 80 MMHG | BODY MASS INDEX: 34.59 KG/M2 | HEIGHT: 74 IN | HEART RATE: 87 BPM | SYSTOLIC BLOOD PRESSURE: 118 MMHG

## 2021-01-21 DIAGNOSIS — M51.9 LUMBAR DISC DISEASE: ICD-10-CM

## 2021-01-21 DIAGNOSIS — I10 ESSENTIAL HYPERTENSION: ICD-10-CM

## 2021-01-21 DIAGNOSIS — E11.9 CONTROLLED TYPE 2 DIABETES MELLITUS WITHOUT COMPLICATION, WITHOUT LONG-TERM CURRENT USE OF INSULIN: Primary | ICD-10-CM

## 2021-01-21 PROCEDURE — 99999 PR PBB SHADOW E&M-EST. PATIENT-LVL III: ICD-10-PCS | Mod: PBBFAC,,, | Performed by: FAMILY MEDICINE

## 2021-01-21 PROCEDURE — 99214 OFFICE O/P EST MOD 30 MIN: CPT | Mod: S$PBB,,, | Performed by: FAMILY MEDICINE

## 2021-01-21 PROCEDURE — 99999 PR PBB SHADOW E&M-EST. PATIENT-LVL III: CPT | Mod: PBBFAC,,, | Performed by: FAMILY MEDICINE

## 2021-01-21 PROCEDURE — 99214 PR OFFICE/OUTPT VISIT, EST, LEVL IV, 30-39 MIN: ICD-10-PCS | Mod: S$PBB,,, | Performed by: FAMILY MEDICINE

## 2021-01-21 PROCEDURE — 99213 OFFICE O/P EST LOW 20 MIN: CPT | Mod: PBBFAC,PN | Performed by: FAMILY MEDICINE

## 2021-02-03 ENCOUNTER — PATIENT MESSAGE (OUTPATIENT)
Dept: REHABILITATION | Facility: HOSPITAL | Age: 46
End: 2021-02-03

## 2021-02-04 ENCOUNTER — TELEPHONE (OUTPATIENT)
Dept: FAMILY MEDICINE | Facility: CLINIC | Age: 46
End: 2021-02-04

## 2021-03-31 ENCOUNTER — OFFICE VISIT (OUTPATIENT)
Dept: FAMILY MEDICINE | Facility: CLINIC | Age: 46
End: 2021-03-31
Payer: MEDICAID

## 2021-03-31 VITALS
HEART RATE: 94 BPM | TEMPERATURE: 98 F | SYSTOLIC BLOOD PRESSURE: 136 MMHG | OXYGEN SATURATION: 96 % | HEIGHT: 74 IN | WEIGHT: 270.31 LBS | BODY MASS INDEX: 34.69 KG/M2 | DIASTOLIC BLOOD PRESSURE: 98 MMHG

## 2021-03-31 DIAGNOSIS — M72.2 PLANTAR FASCIITIS OF RIGHT FOOT: ICD-10-CM

## 2021-03-31 DIAGNOSIS — B00.1 HERPES LABIALIS: Primary | ICD-10-CM

## 2021-03-31 PROBLEM — E11.9 CONTROLLED TYPE 2 DIABETES MELLITUS WITHOUT COMPLICATION, WITHOUT LONG-TERM CURRENT USE OF INSULIN: Status: ACTIVE | Noted: 2021-03-31

## 2021-03-31 PROCEDURE — 99214 OFFICE O/P EST MOD 30 MIN: CPT | Mod: S$PBB,,, | Performed by: FAMILY MEDICINE

## 2021-03-31 PROCEDURE — 99999 PR PBB SHADOW E&M-EST. PATIENT-LVL IV: ICD-10-PCS | Mod: PBBFAC,,, | Performed by: FAMILY MEDICINE

## 2021-03-31 PROCEDURE — 99999 PR PBB SHADOW E&M-EST. PATIENT-LVL IV: CPT | Mod: PBBFAC,,, | Performed by: FAMILY MEDICINE

## 2021-03-31 PROCEDURE — 99214 OFFICE O/P EST MOD 30 MIN: CPT | Mod: PBBFAC,PN | Performed by: FAMILY MEDICINE

## 2021-03-31 PROCEDURE — 99214 PR OFFICE/OUTPT VISIT, EST, LEVL IV, 30-39 MIN: ICD-10-PCS | Mod: S$PBB,,, | Performed by: FAMILY MEDICINE

## 2021-03-31 RX ORDER — ACYCLOVIR 50 MG/G
CREAM TOPICAL
Qty: 5 G | Refills: 1 | Status: SHIPPED | OUTPATIENT
Start: 2021-03-31

## 2021-03-31 RX ORDER — MELOXICAM 15 MG/1
15 TABLET ORAL DAILY
Qty: 30 TABLET | Refills: 2 | Status: SHIPPED | OUTPATIENT
Start: 2021-03-31 | End: 2021-05-25

## 2021-03-31 RX ORDER — VALACYCLOVIR HYDROCHLORIDE 1 G/1
1000 TABLET, FILM COATED ORAL 3 TIMES DAILY
Qty: 21 TABLET | Refills: 0 | Status: SHIPPED | OUTPATIENT
Start: 2021-03-31 | End: 2021-10-08 | Stop reason: SDUPTHER

## 2021-04-01 ENCOUNTER — TELEPHONE (OUTPATIENT)
Dept: FAMILY MEDICINE | Facility: CLINIC | Age: 46
End: 2021-04-01

## 2021-04-12 ENCOUNTER — PATIENT MESSAGE (OUTPATIENT)
Dept: FAMILY MEDICINE | Facility: CLINIC | Age: 46
End: 2021-04-12

## 2021-05-13 DIAGNOSIS — E11.9 TYPE 2 DIABETES MELLITUS WITHOUT COMPLICATION: ICD-10-CM

## 2021-05-14 ENCOUNTER — PATIENT MESSAGE (OUTPATIENT)
Dept: FAMILY MEDICINE | Facility: CLINIC | Age: 46
End: 2021-05-14

## 2021-05-15 ENCOUNTER — LAB VISIT (OUTPATIENT)
Dept: LAB | Facility: HOSPITAL | Age: 46
End: 2021-05-15
Attending: FAMILY MEDICINE
Payer: MEDICAID

## 2021-05-15 DIAGNOSIS — I10 ESSENTIAL HYPERTENSION: ICD-10-CM

## 2021-05-15 DIAGNOSIS — E11.9 CONTROLLED TYPE 2 DIABETES MELLITUS WITHOUT COMPLICATION, WITHOUT LONG-TERM CURRENT USE OF INSULIN: ICD-10-CM

## 2021-05-15 LAB
ALBUMIN SERPL BCP-MCNC: 3.8 G/DL (ref 3.5–5.2)
ALP SERPL-CCNC: 57 U/L (ref 55–135)
ALT SERPL W/O P-5'-P-CCNC: 41 U/L (ref 10–44)
ANION GAP SERPL CALC-SCNC: 7 MMOL/L (ref 8–16)
AST SERPL-CCNC: 27 U/L (ref 10–40)
BILIRUB SERPL-MCNC: 0.6 MG/DL (ref 0.1–1)
BUN SERPL-MCNC: 13 MG/DL (ref 6–20)
CALCIUM SERPL-MCNC: 9.4 MG/DL (ref 8.7–10.5)
CHLORIDE SERPL-SCNC: 104 MMOL/L (ref 95–110)
CHOLEST SERPL-MCNC: 182 MG/DL (ref 120–199)
CHOLEST/HDLC SERPL: 4.7 {RATIO} (ref 2–5)
CO2 SERPL-SCNC: 28 MMOL/L (ref 23–29)
CREAT SERPL-MCNC: 1 MG/DL (ref 0.5–1.4)
EST. GFR  (AFRICAN AMERICAN): >60 ML/MIN/1.73 M^2
EST. GFR  (NON AFRICAN AMERICAN): >60 ML/MIN/1.73 M^2
ESTIMATED AVG GLUCOSE: 154 MG/DL (ref 68–131)
GLUCOSE SERPL-MCNC: 150 MG/DL (ref 70–110)
HBA1C MFR BLD: 7 % (ref 4–5.6)
HDLC SERPL-MCNC: 39 MG/DL (ref 40–75)
HDLC SERPL: 21.4 % (ref 20–50)
LDLC SERPL CALC-MCNC: 112.2 MG/DL (ref 63–159)
NONHDLC SERPL-MCNC: 143 MG/DL
POTASSIUM SERPL-SCNC: 4.4 MMOL/L (ref 3.5–5.1)
PROT SERPL-MCNC: 7.9 G/DL (ref 6–8.4)
SODIUM SERPL-SCNC: 139 MMOL/L (ref 136–145)
TRIGL SERPL-MCNC: 154 MG/DL (ref 30–150)

## 2021-05-15 PROCEDURE — 80061 LIPID PANEL: CPT | Performed by: FAMILY MEDICINE

## 2021-05-15 PROCEDURE — 83036 HEMOGLOBIN GLYCOSYLATED A1C: CPT | Performed by: FAMILY MEDICINE

## 2021-05-15 PROCEDURE — 80053 COMPREHEN METABOLIC PANEL: CPT | Performed by: FAMILY MEDICINE

## 2021-05-15 PROCEDURE — 36415 COLL VENOUS BLD VENIPUNCTURE: CPT | Mod: PO | Performed by: FAMILY MEDICINE

## 2021-05-17 ENCOUNTER — TELEPHONE (OUTPATIENT)
Dept: FAMILY MEDICINE | Facility: CLINIC | Age: 46
End: 2021-05-17

## 2021-05-20 ENCOUNTER — PATIENT OUTREACH (OUTPATIENT)
Dept: ADMINISTRATIVE | Facility: HOSPITAL | Age: 46
End: 2021-05-20

## 2021-05-24 DIAGNOSIS — M51.9 LUMBAR DISC DISEASE: ICD-10-CM

## 2021-05-25 RX ORDER — CELECOXIB 200 MG/1
200 CAPSULE ORAL 2 TIMES DAILY
Qty: 60 CAPSULE | Refills: 5 | Status: SHIPPED | OUTPATIENT
Start: 2021-05-25 | End: 2021-11-18

## 2021-06-02 ENCOUNTER — TELEPHONE (OUTPATIENT)
Dept: FAMILY MEDICINE | Facility: CLINIC | Age: 46
End: 2021-06-02

## 2021-06-27 ENCOUNTER — PATIENT MESSAGE (OUTPATIENT)
Dept: REHABILITATION | Facility: HOSPITAL | Age: 46
End: 2021-06-27

## 2021-07-07 ENCOUNTER — PATIENT MESSAGE (OUTPATIENT)
Dept: ADMINISTRATIVE | Facility: HOSPITAL | Age: 46
End: 2021-07-07

## 2021-07-20 ENCOUNTER — OFFICE VISIT (OUTPATIENT)
Dept: FAMILY MEDICINE | Facility: CLINIC | Age: 46
End: 2021-07-20
Payer: MEDICAID

## 2021-07-20 VITALS
DIASTOLIC BLOOD PRESSURE: 78 MMHG | BODY MASS INDEX: 34.72 KG/M2 | HEART RATE: 85 BPM | OXYGEN SATURATION: 96 % | WEIGHT: 270.5 LBS | HEIGHT: 74 IN | SYSTOLIC BLOOD PRESSURE: 118 MMHG | TEMPERATURE: 98 F

## 2021-07-20 DIAGNOSIS — E11.9 CONTROLLED TYPE 2 DIABETES MELLITUS WITHOUT COMPLICATION, WITHOUT LONG-TERM CURRENT USE OF INSULIN: Primary | ICD-10-CM

## 2021-07-20 DIAGNOSIS — K21.9 GASTROESOPHAGEAL REFLUX DISEASE, UNSPECIFIED WHETHER ESOPHAGITIS PRESENT: ICD-10-CM

## 2021-07-20 DIAGNOSIS — I10 ESSENTIAL HYPERTENSION: ICD-10-CM

## 2021-07-20 PROCEDURE — 99999 PR PBB SHADOW E&M-EST. PATIENT-LVL IV: ICD-10-PCS | Mod: PBBFAC,,, | Performed by: INTERNAL MEDICINE

## 2021-07-20 PROCEDURE — 99214 OFFICE O/P EST MOD 30 MIN: CPT | Mod: PBBFAC,PO | Performed by: INTERNAL MEDICINE

## 2021-07-20 PROCEDURE — 99214 OFFICE O/P EST MOD 30 MIN: CPT | Mod: S$PBB,,, | Performed by: INTERNAL MEDICINE

## 2021-07-20 PROCEDURE — 99214 PR OFFICE/OUTPT VISIT, EST, LEVL IV, 30-39 MIN: ICD-10-PCS | Mod: S$PBB,,, | Performed by: INTERNAL MEDICINE

## 2021-07-20 PROCEDURE — 99999 PR PBB SHADOW E&M-EST. PATIENT-LVL IV: CPT | Mod: PBBFAC,,, | Performed by: INTERNAL MEDICINE

## 2021-07-20 RX ORDER — ATORVASTATIN CALCIUM 10 MG/1
10 TABLET, FILM COATED ORAL DAILY
Qty: 30 TABLET | Refills: 1 | Status: SHIPPED | OUTPATIENT
Start: 2021-07-20 | End: 2021-09-19

## 2021-07-20 RX ORDER — ATORVASTATIN CALCIUM 10 MG/1
10 TABLET, FILM COATED ORAL DAILY
Qty: 30 TABLET | Refills: 1 | Status: SHIPPED | OUTPATIENT
Start: 2021-07-20 | End: 2021-07-20

## 2021-07-20 RX ORDER — CLOTRIMAZOLE 1 %
CREAM (GRAM) TOPICAL
COMMUNITY
Start: 2021-02-01

## 2021-08-03 ENCOUNTER — PATIENT MESSAGE (OUTPATIENT)
Dept: FAMILY MEDICINE | Facility: CLINIC | Age: 46
End: 2021-08-03

## 2021-08-05 ENCOUNTER — PATIENT MESSAGE (OUTPATIENT)
Dept: FAMILY MEDICINE | Facility: CLINIC | Age: 46
End: 2021-08-05

## 2021-08-20 ENCOUNTER — PATIENT MESSAGE (OUTPATIENT)
Dept: FAMILY MEDICINE | Facility: CLINIC | Age: 46
End: 2021-08-20

## 2021-08-23 ENCOUNTER — PATIENT MESSAGE (OUTPATIENT)
Dept: FAMILY MEDICINE | Facility: CLINIC | Age: 46
End: 2021-08-23

## 2021-09-08 ENCOUNTER — DOCUMENTATION ONLY (OUTPATIENT)
Dept: FAMILY MEDICINE | Facility: CLINIC | Age: 46
End: 2021-09-08

## 2021-09-08 ENCOUNTER — TELEPHONE (OUTPATIENT)
Dept: FAMILY MEDICINE | Facility: CLINIC | Age: 46
End: 2021-09-08

## 2021-09-08 ENCOUNTER — PATIENT MESSAGE (OUTPATIENT)
Dept: FAMILY MEDICINE | Facility: CLINIC | Age: 46
End: 2021-09-08

## 2021-09-11 ENCOUNTER — LAB VISIT (OUTPATIENT)
Dept: LAB | Facility: HOSPITAL | Age: 46
End: 2021-09-11
Attending: INTERNAL MEDICINE
Payer: MEDICAID

## 2021-09-11 DIAGNOSIS — E11.9 CONTROLLED TYPE 2 DIABETES MELLITUS WITHOUT COMPLICATION, WITHOUT LONG-TERM CURRENT USE OF INSULIN: ICD-10-CM

## 2021-09-11 LAB
ALBUMIN SERPL BCP-MCNC: 3.7 G/DL (ref 3.5–5.2)
ALP SERPL-CCNC: 57 U/L (ref 55–135)
ALT SERPL W/O P-5'-P-CCNC: 48 U/L (ref 10–44)
ANION GAP SERPL CALC-SCNC: 9 MMOL/L (ref 8–16)
AST SERPL-CCNC: 37 U/L (ref 10–40)
BILIRUB SERPL-MCNC: 0.5 MG/DL (ref 0.1–1)
BUN SERPL-MCNC: 15 MG/DL (ref 6–20)
CALCIUM SERPL-MCNC: 9.2 MG/DL (ref 8.7–10.5)
CHLORIDE SERPL-SCNC: 105 MMOL/L (ref 95–110)
CHOLEST SERPL-MCNC: 143 MG/DL (ref 120–199)
CHOLEST/HDLC SERPL: 3.5 {RATIO} (ref 2–5)
CK SERPL-CCNC: 49 U/L (ref 20–200)
CO2 SERPL-SCNC: 26 MMOL/L (ref 23–29)
CREAT SERPL-MCNC: 0.9 MG/DL (ref 0.5–1.4)
EST. GFR  (AFRICAN AMERICAN): >60 ML/MIN/1.73 M^2
EST. GFR  (NON AFRICAN AMERICAN): >60 ML/MIN/1.73 M^2
ESTIMATED AVG GLUCOSE: 200 MG/DL (ref 68–131)
GLUCOSE SERPL-MCNC: 190 MG/DL (ref 70–110)
HBA1C MFR BLD: 8.6 % (ref 4–5.6)
HDLC SERPL-MCNC: 41 MG/DL (ref 40–75)
HDLC SERPL: 28.7 % (ref 20–50)
LDLC SERPL CALC-MCNC: 72.6 MG/DL (ref 63–159)
NONHDLC SERPL-MCNC: 102 MG/DL
POTASSIUM SERPL-SCNC: 4 MMOL/L (ref 3.5–5.1)
PROT SERPL-MCNC: 7.2 G/DL (ref 6–8.4)
SODIUM SERPL-SCNC: 140 MMOL/L (ref 136–145)
TRIGL SERPL-MCNC: 147 MG/DL (ref 30–150)

## 2021-09-11 PROCEDURE — 82550 ASSAY OF CK (CPK): CPT | Performed by: INTERNAL MEDICINE

## 2021-09-11 PROCEDURE — 83036 HEMOGLOBIN GLYCOSYLATED A1C: CPT | Performed by: INTERNAL MEDICINE

## 2021-09-11 PROCEDURE — 80061 LIPID PANEL: CPT | Performed by: INTERNAL MEDICINE

## 2021-09-11 PROCEDURE — 80053 COMPREHEN METABOLIC PANEL: CPT | Performed by: INTERNAL MEDICINE

## 2021-09-11 PROCEDURE — 36415 COLL VENOUS BLD VENIPUNCTURE: CPT | Mod: PO | Performed by: INTERNAL MEDICINE

## 2021-09-16 ENCOUNTER — OFFICE VISIT (OUTPATIENT)
Dept: FAMILY MEDICINE | Facility: CLINIC | Age: 46
End: 2021-09-16
Payer: MEDICAID

## 2021-09-16 VITALS
DIASTOLIC BLOOD PRESSURE: 80 MMHG | OXYGEN SATURATION: 96 % | HEART RATE: 81 BPM | SYSTOLIC BLOOD PRESSURE: 130 MMHG | WEIGHT: 266.56 LBS | HEIGHT: 74 IN | BODY MASS INDEX: 34.21 KG/M2

## 2021-09-16 DIAGNOSIS — E11.65 UNCONTROLLED TYPE 2 DIABETES MELLITUS WITH HYPERGLYCEMIA: Primary | ICD-10-CM

## 2021-09-16 PROCEDURE — 99999 PR PBB SHADOW E&M-EST. PATIENT-LVL IV: ICD-10-PCS | Mod: PBBFAC,,, | Performed by: INTERNAL MEDICINE

## 2021-09-16 PROCEDURE — 99214 OFFICE O/P EST MOD 30 MIN: CPT | Mod: S$PBB,,, | Performed by: INTERNAL MEDICINE

## 2021-09-16 PROCEDURE — 99999 PR PBB SHADOW E&M-EST. PATIENT-LVL IV: CPT | Mod: PBBFAC,,, | Performed by: INTERNAL MEDICINE

## 2021-09-16 PROCEDURE — 99214 OFFICE O/P EST MOD 30 MIN: CPT | Mod: PBBFAC,PO | Performed by: INTERNAL MEDICINE

## 2021-09-16 PROCEDURE — 99214 PR OFFICE/OUTPT VISIT, EST, LEVL IV, 30-39 MIN: ICD-10-PCS | Mod: S$PBB,,, | Performed by: INTERNAL MEDICINE

## 2021-09-16 RX ORDER — AZITHROMYCIN 250 MG/1
TABLET, FILM COATED ORAL
COMMUNITY
Start: 2021-08-08 | End: 2021-10-08 | Stop reason: ALTCHOICE

## 2021-09-16 RX ORDER — EMPAGLIFLOZIN 10 MG/1
10 TABLET, FILM COATED ORAL DAILY
Qty: 30 TABLET | Refills: 1 | Status: SHIPPED | OUTPATIENT
Start: 2021-09-16 | End: 2021-11-16 | Stop reason: SDUPTHER

## 2021-10-07 ENCOUNTER — PATIENT MESSAGE (OUTPATIENT)
Dept: FAMILY MEDICINE | Facility: CLINIC | Age: 46
End: 2021-10-07

## 2021-10-08 ENCOUNTER — OFFICE VISIT (OUTPATIENT)
Dept: FAMILY MEDICINE | Facility: CLINIC | Age: 46
End: 2021-10-08
Payer: MEDICAID

## 2021-10-08 DIAGNOSIS — B00.1 HERPES LABIALIS: ICD-10-CM

## 2021-10-08 PROCEDURE — 99213 PR OFFICE/OUTPT VISIT, EST, LEVL III, 20-29 MIN: ICD-10-PCS | Mod: 95,,, | Performed by: PHYSICIAN ASSISTANT

## 2021-10-08 PROCEDURE — 99213 OFFICE O/P EST LOW 20 MIN: CPT | Mod: 95,,, | Performed by: PHYSICIAN ASSISTANT

## 2021-10-08 RX ORDER — VALACYCLOVIR HYDROCHLORIDE 1 G/1
1000 TABLET, FILM COATED ORAL 3 TIMES DAILY
Qty: 21 TABLET | Refills: 0 | Status: SHIPPED | OUTPATIENT
Start: 2021-10-08 | End: 2021-12-16 | Stop reason: SDUPTHER

## 2021-10-16 ENCOUNTER — PATIENT MESSAGE (OUTPATIENT)
Dept: FAMILY MEDICINE | Facility: CLINIC | Age: 46
End: 2021-10-16
Payer: MEDICAID

## 2021-10-16 DIAGNOSIS — B00.1 HERPES LABIALIS: Primary | ICD-10-CM

## 2021-11-16 ENCOUNTER — PATIENT MESSAGE (OUTPATIENT)
Dept: FAMILY MEDICINE | Facility: CLINIC | Age: 46
End: 2021-11-16
Payer: MEDICAID

## 2021-11-16 DIAGNOSIS — E11.65 UNCONTROLLED TYPE 2 DIABETES MELLITUS WITH HYPERGLYCEMIA: ICD-10-CM

## 2021-11-16 RX ORDER — EMPAGLIFLOZIN 10 MG/1
10 TABLET, FILM COATED ORAL DAILY
Qty: 90 TABLET | Refills: 1 | Status: SHIPPED | OUTPATIENT
Start: 2021-11-16 | End: 2021-11-17 | Stop reason: SDUPTHER

## 2021-11-17 ENCOUNTER — PATIENT MESSAGE (OUTPATIENT)
Dept: FAMILY MEDICINE | Facility: CLINIC | Age: 46
End: 2021-11-17
Payer: MEDICAID

## 2021-11-17 ENCOUNTER — DOCUMENTATION ONLY (OUTPATIENT)
Dept: FAMILY MEDICINE | Facility: CLINIC | Age: 46
End: 2021-11-17
Payer: MEDICAID

## 2021-11-17 ENCOUNTER — TELEPHONE (OUTPATIENT)
Dept: FAMILY MEDICINE | Facility: CLINIC | Age: 46
End: 2021-11-17
Payer: MEDICAID

## 2021-11-17 DIAGNOSIS — E11.65 UNCONTROLLED TYPE 2 DIABETES MELLITUS WITH HYPERGLYCEMIA: ICD-10-CM

## 2021-11-17 RX ORDER — EMPAGLIFLOZIN 10 MG/1
10 TABLET, FILM COATED ORAL DAILY
Qty: 90 TABLET | Refills: 1 | Status: SHIPPED | OUTPATIENT
Start: 2021-11-17 | End: 2022-02-11 | Stop reason: SDUPTHER

## 2021-11-18 DIAGNOSIS — E11.9 TYPE 2 DIABETES MELLITUS WITHOUT COMPLICATION, UNSPECIFIED WHETHER LONG TERM INSULIN USE: ICD-10-CM

## 2021-12-11 ENCOUNTER — LAB VISIT (OUTPATIENT)
Dept: LAB | Facility: HOSPITAL | Age: 46
End: 2021-12-11
Attending: INTERNAL MEDICINE
Payer: MEDICAID

## 2021-12-11 DIAGNOSIS — E11.65 UNCONTROLLED TYPE 2 DIABETES MELLITUS WITH HYPERGLYCEMIA: ICD-10-CM

## 2021-12-11 LAB
ALBUMIN SERPL BCP-MCNC: 3.8 G/DL (ref 3.5–5.2)
ALP SERPL-CCNC: 65 U/L (ref 55–135)
ALT SERPL W/O P-5'-P-CCNC: 30 U/L (ref 10–44)
ANION GAP SERPL CALC-SCNC: 9 MMOL/L (ref 8–16)
AST SERPL-CCNC: 27 U/L (ref 10–40)
BILIRUB SERPL-MCNC: 0.6 MG/DL (ref 0.1–1)
BUN SERPL-MCNC: 9 MG/DL (ref 6–20)
CALCIUM SERPL-MCNC: 9.7 MG/DL (ref 8.7–10.5)
CHLORIDE SERPL-SCNC: 105 MMOL/L (ref 95–110)
CO2 SERPL-SCNC: 25 MMOL/L (ref 23–29)
CREAT SERPL-MCNC: 0.9 MG/DL (ref 0.5–1.4)
EST. GFR  (AFRICAN AMERICAN): >60 ML/MIN/1.73 M^2
EST. GFR  (NON AFRICAN AMERICAN): >60 ML/MIN/1.73 M^2
ESTIMATED AVG GLUCOSE: 169 MG/DL (ref 68–131)
GLUCOSE SERPL-MCNC: 138 MG/DL (ref 70–110)
HBA1C MFR BLD: 7.5 % (ref 4–5.6)
POTASSIUM SERPL-SCNC: 4.6 MMOL/L (ref 3.5–5.1)
PROT SERPL-MCNC: 7.6 G/DL (ref 6–8.4)
SODIUM SERPL-SCNC: 139 MMOL/L (ref 136–145)

## 2021-12-11 PROCEDURE — 83036 HEMOGLOBIN GLYCOSYLATED A1C: CPT | Performed by: INTERNAL MEDICINE

## 2021-12-11 PROCEDURE — 36415 COLL VENOUS BLD VENIPUNCTURE: CPT | Mod: PO | Performed by: INTERNAL MEDICINE

## 2021-12-11 PROCEDURE — 80053 COMPREHEN METABOLIC PANEL: CPT | Performed by: INTERNAL MEDICINE

## 2021-12-16 ENCOUNTER — OFFICE VISIT (OUTPATIENT)
Dept: FAMILY MEDICINE | Facility: CLINIC | Age: 46
End: 2021-12-16
Payer: MEDICAID

## 2021-12-16 ENCOUNTER — CLINICAL SUPPORT (OUTPATIENT)
Dept: FAMILY MEDICINE | Facility: CLINIC | Age: 46
End: 2021-12-16
Attending: INTERNAL MEDICINE
Payer: MEDICAID

## 2021-12-16 VITALS
SYSTOLIC BLOOD PRESSURE: 122 MMHG | DIASTOLIC BLOOD PRESSURE: 80 MMHG | OXYGEN SATURATION: 96 % | HEART RATE: 84 BPM | BODY MASS INDEX: 33.38 KG/M2 | HEIGHT: 74 IN | WEIGHT: 260.13 LBS

## 2021-12-16 DIAGNOSIS — E66.9 CLASS 1 OBESITY: ICD-10-CM

## 2021-12-16 DIAGNOSIS — E11.9 TYPE 2 DIABETES MELLITUS WITHOUT COMPLICATION, WITHOUT LONG-TERM CURRENT USE OF INSULIN: Primary | ICD-10-CM

## 2021-12-16 DIAGNOSIS — B00.1 HERPES LABIALIS: ICD-10-CM

## 2021-12-16 DIAGNOSIS — E11.9 TYPE 2 DIABETES MELLITUS WITHOUT COMPLICATION, WITHOUT LONG-TERM CURRENT USE OF INSULIN: ICD-10-CM

## 2021-12-16 PROBLEM — E66.811 CLASS 1 OBESITY: Status: ACTIVE | Noted: 2021-12-16

## 2021-12-16 LAB — GLUCOSE SERPL-MCNC: 145 MG/DL (ref 70–110)

## 2021-12-16 PROCEDURE — 3066F NEPHROPATHY DOC TX: CPT | Mod: CPTII,,, | Performed by: INTERNAL MEDICINE

## 2021-12-16 PROCEDURE — 4010F PR ACE/ARB THEARPY RXD/TAKEN: ICD-10-PCS | Mod: CPTII,,, | Performed by: INTERNAL MEDICINE

## 2021-12-16 PROCEDURE — 99214 OFFICE O/P EST MOD 30 MIN: CPT | Mod: PBBFAC,PO | Performed by: INTERNAL MEDICINE

## 2021-12-16 PROCEDURE — 3061F PR NEG MICROALBUMINURIA RESULT DOCUMENTED/REVIEW: ICD-10-PCS | Mod: CPTII,,, | Performed by: INTERNAL MEDICINE

## 2021-12-16 PROCEDURE — 4010F ACE/ARB THERAPY RXD/TAKEN: CPT | Mod: CPTII,,, | Performed by: INTERNAL MEDICINE

## 2021-12-16 PROCEDURE — 92228 IMG RTA DETC/MNTR DS PHY/QHP: CPT | Mod: 26,S$PBB,, | Performed by: OPTOMETRIST

## 2021-12-16 PROCEDURE — 3066F PR DOCUMENTATION OF TREATMENT FOR NEPHROPATHY: ICD-10-PCS | Mod: CPTII,,, | Performed by: INTERNAL MEDICINE

## 2021-12-16 PROCEDURE — 99214 OFFICE O/P EST MOD 30 MIN: CPT | Mod: S$PBB,,, | Performed by: INTERNAL MEDICINE

## 2021-12-16 PROCEDURE — 99214 PR OFFICE/OUTPT VISIT, EST, LEVL IV, 30-39 MIN: ICD-10-PCS | Mod: S$PBB,,, | Performed by: INTERNAL MEDICINE

## 2021-12-16 PROCEDURE — 92228 DIABETIC EYE SCREENING PHOTO: ICD-10-PCS | Mod: 26,S$PBB,, | Performed by: OPTOMETRIST

## 2021-12-16 PROCEDURE — 82962 GLUCOSE BLOOD TEST: CPT | Mod: PBBFAC,PO | Performed by: INTERNAL MEDICINE

## 2021-12-16 PROCEDURE — 3061F NEG MICROALBUMINURIA REV: CPT | Mod: CPTII,,, | Performed by: INTERNAL MEDICINE

## 2021-12-16 PROCEDURE — 99999 PR PBB SHADOW E&M-EST. PATIENT-LVL IV: CPT | Mod: PBBFAC,,, | Performed by: INTERNAL MEDICINE

## 2021-12-16 PROCEDURE — 92228 IMG RTA DETC/MNTR DS PHY/QHP: CPT | Mod: PBBFAC,PO

## 2021-12-16 PROCEDURE — 99999 PR PBB SHADOW E&M-EST. PATIENT-LVL IV: ICD-10-PCS | Mod: PBBFAC,,, | Performed by: INTERNAL MEDICINE

## 2021-12-16 RX ORDER — VALACYCLOVIR HYDROCHLORIDE 1 G/1
1000 TABLET, FILM COATED ORAL 3 TIMES DAILY
Qty: 21 TABLET | Refills: 5 | Status: SHIPPED | OUTPATIENT
Start: 2021-12-16 | End: 2023-06-26

## 2021-12-19 ENCOUNTER — PATIENT MESSAGE (OUTPATIENT)
Dept: FAMILY MEDICINE | Facility: CLINIC | Age: 46
End: 2021-12-19
Payer: MEDICAID

## 2021-12-19 DIAGNOSIS — E11.9 TYPE 2 DIABETES MELLITUS WITHOUT COMPLICATION, WITHOUT LONG-TERM CURRENT USE OF INSULIN: Primary | ICD-10-CM

## 2021-12-20 ENCOUNTER — PATIENT MESSAGE (OUTPATIENT)
Dept: FAMILY MEDICINE | Facility: CLINIC | Age: 46
End: 2021-12-20
Payer: MEDICAID

## 2021-12-20 ENCOUNTER — TELEPHONE (OUTPATIENT)
Dept: FAMILY MEDICINE | Facility: CLINIC | Age: 46
End: 2021-12-20
Payer: MEDICAID

## 2022-01-20 ENCOUNTER — PATIENT MESSAGE (OUTPATIENT)
Dept: FAMILY MEDICINE | Facility: CLINIC | Age: 47
End: 2022-01-20
Payer: MEDICAID

## 2022-01-20 DIAGNOSIS — M51.9 LUMBAR DISC DISEASE: ICD-10-CM

## 2022-01-20 RX ORDER — CELECOXIB 200 MG/1
200 CAPSULE ORAL DAILY
Qty: 90 CAPSULE | Refills: 3 | Status: SHIPPED | OUTPATIENT
Start: 2022-01-20 | End: 2023-02-04 | Stop reason: SDUPTHER

## 2022-01-20 RX ORDER — CELECOXIB 200 MG/1
200 CAPSULE ORAL DAILY
Qty: 90 CAPSULE | Refills: 3 | Status: SHIPPED | OUTPATIENT
Start: 2022-01-20 | End: 2022-01-20 | Stop reason: SDUPTHER

## 2022-01-21 ENCOUNTER — TELEPHONE (OUTPATIENT)
Dept: PHARMACY | Facility: CLINIC | Age: 47
End: 2022-01-21
Payer: MEDICAID

## 2022-02-24 ENCOUNTER — PATIENT MESSAGE (OUTPATIENT)
Dept: ADMINISTRATIVE | Facility: HOSPITAL | Age: 47
End: 2022-02-24
Payer: MEDICAID

## 2022-02-24 ENCOUNTER — PATIENT OUTREACH (OUTPATIENT)
Dept: ADMINISTRATIVE | Facility: HOSPITAL | Age: 47
End: 2022-02-24
Payer: MEDICAID

## 2022-03-07 ENCOUNTER — PATIENT MESSAGE (OUTPATIENT)
Dept: FAMILY MEDICINE | Facility: CLINIC | Age: 47
End: 2022-03-07
Payer: MEDICAID

## 2022-03-09 ENCOUNTER — LAB VISIT (OUTPATIENT)
Dept: LAB | Facility: HOSPITAL | Age: 47
End: 2022-03-09
Attending: INTERNAL MEDICINE
Payer: MEDICAID

## 2022-03-09 DIAGNOSIS — E11.9 TYPE 2 DIABETES MELLITUS WITHOUT COMPLICATION, WITHOUT LONG-TERM CURRENT USE OF INSULIN: ICD-10-CM

## 2022-03-09 LAB
ESTIMATED AVG GLUCOSE: 160 MG/DL (ref 68–131)
HBA1C MFR BLD: 7.2 % (ref 4–5.6)

## 2022-03-09 PROCEDURE — 36415 COLL VENOUS BLD VENIPUNCTURE: CPT | Mod: PO | Performed by: INTERNAL MEDICINE

## 2022-03-09 PROCEDURE — 83036 HEMOGLOBIN GLYCOSYLATED A1C: CPT | Performed by: INTERNAL MEDICINE

## 2022-03-10 ENCOUNTER — TELEPHONE (OUTPATIENT)
Dept: FAMILY MEDICINE | Facility: CLINIC | Age: 47
End: 2022-03-10

## 2022-03-10 ENCOUNTER — OFFICE VISIT (OUTPATIENT)
Dept: FAMILY MEDICINE | Facility: CLINIC | Age: 47
End: 2022-03-10
Payer: MEDICAID

## 2022-03-10 VITALS
BODY MASS INDEX: 32.96 KG/M2 | DIASTOLIC BLOOD PRESSURE: 80 MMHG | HEIGHT: 74 IN | WEIGHT: 256.81 LBS | HEART RATE: 95 BPM | SYSTOLIC BLOOD PRESSURE: 116 MMHG | OXYGEN SATURATION: 96 %

## 2022-03-10 DIAGNOSIS — E11.9 TYPE 2 DIABETES MELLITUS WITHOUT COMPLICATION, WITHOUT LONG-TERM CURRENT USE OF INSULIN: Primary | ICD-10-CM

## 2022-03-10 DIAGNOSIS — E66.9 CLASS 1 OBESITY: ICD-10-CM

## 2022-03-10 DIAGNOSIS — Z12.11 SCREENING FOR COLON CANCER: ICD-10-CM

## 2022-03-10 DIAGNOSIS — E11.65 UNCONTROLLED TYPE 2 DIABETES MELLITUS WITH HYPERGLYCEMIA: ICD-10-CM

## 2022-03-10 PROCEDURE — 3051F HG A1C>EQUAL 7.0%<8.0%: CPT | Mod: CPTII,,, | Performed by: INTERNAL MEDICINE

## 2022-03-10 PROCEDURE — 3008F PR BODY MASS INDEX (BMI) DOCUMENTED: ICD-10-PCS | Mod: CPTII,,, | Performed by: INTERNAL MEDICINE

## 2022-03-10 PROCEDURE — 99214 OFFICE O/P EST MOD 30 MIN: CPT | Mod: PBBFAC,PO | Performed by: INTERNAL MEDICINE

## 2022-03-10 PROCEDURE — 1159F PR MEDICATION LIST DOCUMENTED IN MEDICAL RECORD: ICD-10-PCS | Mod: CPTII,,, | Performed by: INTERNAL MEDICINE

## 2022-03-10 PROCEDURE — 99214 PR OFFICE/OUTPT VISIT, EST, LEVL IV, 30-39 MIN: ICD-10-PCS | Mod: S$PBB,,, | Performed by: INTERNAL MEDICINE

## 2022-03-10 PROCEDURE — 3074F PR MOST RECENT SYSTOLIC BLOOD PRESSURE < 130 MM HG: ICD-10-PCS | Mod: CPTII,,, | Performed by: INTERNAL MEDICINE

## 2022-03-10 PROCEDURE — 99999 PR PBB SHADOW E&M-EST. PATIENT-LVL IV: CPT | Mod: PBBFAC,,, | Performed by: INTERNAL MEDICINE

## 2022-03-10 PROCEDURE — 3074F SYST BP LT 130 MM HG: CPT | Mod: CPTII,,, | Performed by: INTERNAL MEDICINE

## 2022-03-10 PROCEDURE — 1160F PR REVIEW ALL MEDS BY PRESCRIBER/CLIN PHARMACIST DOCUMENTED: ICD-10-PCS | Mod: CPTII,,, | Performed by: INTERNAL MEDICINE

## 2022-03-10 PROCEDURE — 3008F BODY MASS INDEX DOCD: CPT | Mod: CPTII,,, | Performed by: INTERNAL MEDICINE

## 2022-03-10 PROCEDURE — 3079F DIAST BP 80-89 MM HG: CPT | Mod: CPTII,,, | Performed by: INTERNAL MEDICINE

## 2022-03-10 PROCEDURE — 1160F RVW MEDS BY RX/DR IN RCRD: CPT | Mod: CPTII,,, | Performed by: INTERNAL MEDICINE

## 2022-03-10 PROCEDURE — 99999 PR PBB SHADOW E&M-EST. PATIENT-LVL IV: ICD-10-PCS | Mod: PBBFAC,,, | Performed by: INTERNAL MEDICINE

## 2022-03-10 PROCEDURE — 3079F PR MOST RECENT DIASTOLIC BLOOD PRESSURE 80-89 MM HG: ICD-10-PCS | Mod: CPTII,,, | Performed by: INTERNAL MEDICINE

## 2022-03-10 PROCEDURE — 99214 OFFICE O/P EST MOD 30 MIN: CPT | Mod: S$PBB,,, | Performed by: INTERNAL MEDICINE

## 2022-03-10 PROCEDURE — 3051F PR MOST RECENT HEMOGLOBIN A1C LEVEL 7.0 - < 8.0%: ICD-10-PCS | Mod: CPTII,,, | Performed by: INTERNAL MEDICINE

## 2022-03-10 PROCEDURE — 1159F MED LIST DOCD IN RCRD: CPT | Mod: CPTII,,, | Performed by: INTERNAL MEDICINE

## 2022-03-10 RX ORDER — ATORVASTATIN CALCIUM 10 MG/1
10 TABLET, FILM COATED ORAL DAILY
Qty: 90 TABLET | Refills: 3 | Status: SHIPPED | OUTPATIENT
Start: 2022-03-10 | End: 2022-03-10

## 2022-03-10 RX ORDER — EMPAGLIFLOZIN 10 MG/1
10 TABLET, FILM COATED ORAL DAILY
Qty: 90 TABLET | Refills: 1 | Status: SHIPPED | OUTPATIENT
Start: 2022-03-10 | End: 2022-10-14 | Stop reason: SDUPTHER

## 2022-03-10 RX ORDER — ATORVASTATIN CALCIUM 10 MG/1
10 TABLET, FILM COATED ORAL DAILY
Qty: 90 TABLET | Refills: 3 | Status: SHIPPED | OUTPATIENT
Start: 2022-03-10 | End: 2023-03-03 | Stop reason: SDUPTHER

## 2022-03-10 NOTE — PROGRESS NOTES
Patient ID: Jose Boggs is a 46 y.o. male.    Chief Complaint: m+3      Assessment and Plan      Continue Jardiance  Continue atorvastatin  BMP, A1c, and lipid in 3 months  Fit kit    1. Type 2 diabetes mellitus without complication, without long-term current use of insulin  atorvastatin (LIPITOR) 10 MG tablet    Basic Metabolic Panel    Lipid Panel   2. Uncontrolled type 2 diabetes mellitus with hyperglycemia  empagliflozin (JARDIANCE) 10 mg tablet   3. Screening for colon cancer  Fecal Immunochemical Test (iFOBT)   4. Class 1 obesity       HPI     Three-month follow-up diabetes.  A1c went from 7.5 to 7.2. lost 4 lbs since dec.  Taking empaglifozin.  Only complaint is polyuria.  Taking celebrex once daily for low back pain.   Will be seeing eye doctor soon.   Colon cancer screening- will do fit kit.   FitKit was given to patient on 3/10/2022 3:54 PM       Hypertension Medications             olmesartan (BENICAR) 20 MG tablet TAKE 1 TABLET BY MOUTH EVERY DAY        Hyperlipidemia Medications             atorvastatin (LIPITOR) 10 MG tablet TAKE 1 TABLET(10 MG) BY MOUTH EVERY DAY        Diabetes Medications             empagliflozin (JARDIANCE) 10 mg tablet Take 1 tablet (10 mg total) by mouth once daily.    metFORMIN (GLUCOPHAGE) 500 MG tablet TAKE 1 TABLET BY MOUTH TWICE DAILY WITH MEALS          Review of Systems   Constitutional: Negative for fever.   Respiratory: Negative for shortness of breath.    Cardiovascular: Negative for chest pain.   Gastrointestinal: Negative for abdominal pain.   Endocrine: Positive for polyuria.        Vitals:    03/10/22 1507   BP: 116/80   Pulse: 95     Physical Exam  Cardiovascular:      Rate and Rhythm: Normal rate and regular rhythm.      Heart sounds: No murmur heard.    No gallop.   Pulmonary:      Breath sounds: Normal breath sounds. No wheezing or rhonchi.   Abdominal:      General: Bowel sounds are normal.      Palpations: Abdomen is soft.      Tenderness: There is  no abdominal tenderness.   Musculoskeletal:         General: Normal range of motion.      Cervical back: Neck supple.   Skin:     General: Skin is warm.      Findings: No rash.   Neurological:      Mental Status: He is alert.   Psychiatric:         Behavior: Behavior normal.         I personally reviewed past medical, family and social history.  Medication List with Changes/Refills   Current Medications    ACYCLOVIR 5% (ZOVIRAX) 5 % CREA    Apply topically 5 (five) times daily.    BLOOD-GLUCOSE METER MISC    Dispense one glucometer, brand of choice covered by insurance  dispense test strips, and lancets to match. Check glucose Daily    CELECOXIB (CELEBREX) 200 MG CAPSULE    Take 1 capsule (200 mg total) by mouth once daily.    CLOTRIMAZOLE (LOTRIMIN) 1 % CREAM        CLOTRIMAZOLE-BETAMETHASONE 1-0.05% (LOTRISONE) CREAM    APPLY TO AFFECTED AREA TWICE A DAY    OLMESARTAN (BENICAR) 20 MG TABLET    TAKE 1 TABLET BY MOUTH EVERY DAY    OMEPRAZOLE (PRILOSEC) 20 MG CAPSULE    TAKE 1 CAPSULE BY MOUTH EVERYDAY    ONETOUCH DELICA PLUS LANCET 33 GAUGE MISC    USE TO TEST BLOOD SUGAR DAILY    ONETOUCH ULTRA BLUE TEST STRIP STRP    TEST DAILY    VALACYCLOVIR (VALTREX) 1000 MG TABLET    Take 1 tablet (1,000 mg total) by mouth 3 (three) times daily. for 7 days   Changed and/or Refilled Medications    Modified Medication Previous Medication    ATORVASTATIN (LIPITOR) 10 MG TABLET atorvastatin (LIPITOR) 10 MG tablet       Take 1 tablet (10 mg total) by mouth once daily.    TAKE 1 TABLET(10 MG) BY MOUTH EVERY DAY    EMPAGLIFLOZIN (JARDIANCE) 10 MG TABLET empagliflozin (JARDIANCE) 10 mg tablet       Take 1 tablet (10 mg total) by mouth once daily.    Take 1 tablet (10 mg total) by mouth once daily.   Discontinued Medications    METFORMIN (GLUCOPHAGE) 500 MG TABLET    TAKE 1 TABLET BY MOUTH TWICE DAILY WITH MEALS

## 2022-03-14 ENCOUNTER — LAB VISIT (OUTPATIENT)
Dept: LAB | Facility: HOSPITAL | Age: 47
End: 2022-03-14
Attending: INTERNAL MEDICINE
Payer: MEDICAID

## 2022-03-14 DIAGNOSIS — Z12.11 SCREENING FOR COLON CANCER: ICD-10-CM

## 2022-03-14 PROCEDURE — 82274 ASSAY TEST FOR BLOOD FECAL: CPT | Performed by: INTERNAL MEDICINE

## 2022-03-15 LAB — HEMOCCULT STL QL IA: NEGATIVE

## 2022-03-22 LAB
LEFT EYE DM RETINOPATHY: NEGATIVE
RIGHT EYE DM RETINOPATHY: NEGATIVE

## 2022-03-31 ENCOUNTER — PATIENT MESSAGE (OUTPATIENT)
Dept: FAMILY MEDICINE | Facility: CLINIC | Age: 47
End: 2022-03-31
Payer: MEDICAID

## 2022-04-10 ENCOUNTER — PATIENT MESSAGE (OUTPATIENT)
Dept: FAMILY MEDICINE | Facility: CLINIC | Age: 47
End: 2022-04-10
Payer: MEDICAID

## 2022-04-10 DIAGNOSIS — K13.0 LIP FISSURE: Primary | ICD-10-CM

## 2022-04-12 ENCOUNTER — PATIENT OUTREACH (OUTPATIENT)
Dept: ADMINISTRATIVE | Facility: HOSPITAL | Age: 47
End: 2022-04-12
Payer: MEDICAID

## 2022-04-18 RX ORDER — OMEPRAZOLE 20 MG/1
20 CAPSULE, DELAYED RELEASE ORAL DAILY
Qty: 90 CAPSULE | Refills: 1 | Status: SHIPPED | OUTPATIENT
Start: 2022-04-18 | End: 2022-07-14

## 2022-04-18 NOTE — TELEPHONE ENCOUNTER
No new care gaps identified.  Powered by [employer]. Reference number: [id number].   4/18/2022 9:44:24 AM CDT

## 2022-04-18 NOTE — TELEPHONE ENCOUNTER
No new care gaps identified.  Powered by [employer]. Reference number: [id number].   4/17/2022 9:05:59 PM CDT

## 2022-04-19 RX ORDER — OMEPRAZOLE 20 MG/1
20 CAPSULE, DELAYED RELEASE ORAL DAILY
Qty: 90 CAPSULE | Refills: 1 | Status: SHIPPED | OUTPATIENT
Start: 2022-04-19 | End: 2022-07-13 | Stop reason: SDUPTHER

## 2022-05-09 ENCOUNTER — PATIENT MESSAGE (OUTPATIENT)
Dept: SMOKING CESSATION | Facility: CLINIC | Age: 47
End: 2022-05-09
Payer: MEDICAID

## 2022-05-14 ENCOUNTER — PATIENT MESSAGE (OUTPATIENT)
Dept: FAMILY MEDICINE | Facility: CLINIC | Age: 47
End: 2022-05-14
Payer: MEDICAID

## 2022-05-30 ENCOUNTER — TELEPHONE (OUTPATIENT)
Dept: DERMATOLOGY | Facility: CLINIC | Age: 47
End: 2022-05-30
Payer: MEDICAID

## 2022-05-30 NOTE — TELEPHONE ENCOUNTER
Called patient to discuss Dermatology referral.  Left voicemail message for patient, the providers are not accepting any new patients with Medicaid.  Medicaid Escalation Line number was given.

## 2022-06-10 ENCOUNTER — LAB VISIT (OUTPATIENT)
Dept: LAB | Facility: HOSPITAL | Age: 47
End: 2022-06-10
Attending: INTERNAL MEDICINE
Payer: MEDICAID

## 2022-06-10 DIAGNOSIS — E11.9 TYPE 2 DIABETES MELLITUS WITHOUT COMPLICATION, WITHOUT LONG-TERM CURRENT USE OF INSULIN: ICD-10-CM

## 2022-06-10 LAB
ANION GAP SERPL CALC-SCNC: 11 MMOL/L (ref 8–16)
BUN SERPL-MCNC: 12 MG/DL (ref 6–20)
CALCIUM SERPL-MCNC: 9.5 MG/DL (ref 8.7–10.5)
CHLORIDE SERPL-SCNC: 104 MMOL/L (ref 95–110)
CHOLEST SERPL-MCNC: 140 MG/DL (ref 120–199)
CHOLEST/HDLC SERPL: 3.6 {RATIO} (ref 2–5)
CO2 SERPL-SCNC: 25 MMOL/L (ref 23–29)
CREAT SERPL-MCNC: 0.9 MG/DL (ref 0.5–1.4)
EST. GFR  (AFRICAN AMERICAN): >60 ML/MIN/1.73 M^2
EST. GFR  (NON AFRICAN AMERICAN): >60 ML/MIN/1.73 M^2
ESTIMATED AVG GLUCOSE: 189 MG/DL (ref 68–131)
GLUCOSE SERPL-MCNC: 157 MG/DL (ref 70–110)
HBA1C MFR BLD: 8.2 % (ref 4–5.6)
HDLC SERPL-MCNC: 39 MG/DL (ref 40–75)
HDLC SERPL: 27.9 % (ref 20–50)
LDLC SERPL CALC-MCNC: 76.8 MG/DL (ref 63–159)
NONHDLC SERPL-MCNC: 101 MG/DL
POTASSIUM SERPL-SCNC: 4.2 MMOL/L (ref 3.5–5.1)
SODIUM SERPL-SCNC: 140 MMOL/L (ref 136–145)
TRIGL SERPL-MCNC: 121 MG/DL (ref 30–150)

## 2022-06-10 PROCEDURE — 83036 HEMOGLOBIN GLYCOSYLATED A1C: CPT | Performed by: INTERNAL MEDICINE

## 2022-06-10 PROCEDURE — 80048 BASIC METABOLIC PNL TOTAL CA: CPT | Performed by: INTERNAL MEDICINE

## 2022-06-10 PROCEDURE — 36415 COLL VENOUS BLD VENIPUNCTURE: CPT | Mod: PO | Performed by: INTERNAL MEDICINE

## 2022-06-10 PROCEDURE — 80061 LIPID PANEL: CPT | Performed by: INTERNAL MEDICINE

## 2022-06-14 ENCOUNTER — TELEPHONE (OUTPATIENT)
Dept: FAMILY MEDICINE | Facility: CLINIC | Age: 47
End: 2022-06-14
Payer: MEDICAID

## 2022-06-14 ENCOUNTER — PATIENT MESSAGE (OUTPATIENT)
Dept: FAMILY MEDICINE | Facility: CLINIC | Age: 47
End: 2022-06-14
Payer: MEDICAID

## 2022-06-14 DIAGNOSIS — E11.9 TYPE 2 DIABETES MELLITUS WITHOUT COMPLICATION, WITHOUT LONG-TERM CURRENT USE OF INSULIN: Primary | ICD-10-CM

## 2022-06-14 NOTE — TELEPHONE ENCOUNTER
----- Message from Xavier Fernandez DO sent at 6/13/2022  1:15 PM CDT -----  Diabetes  status  has worsened.  Please schedule appointment to discuss

## 2022-06-20 ENCOUNTER — OFFICE VISIT (OUTPATIENT)
Dept: FAMILY MEDICINE | Facility: CLINIC | Age: 47
End: 2022-06-20
Payer: MEDICAID

## 2022-06-20 ENCOUNTER — TELEPHONE (OUTPATIENT)
Dept: DIABETES | Facility: CLINIC | Age: 47
End: 2022-06-20
Payer: MEDICAID

## 2022-06-20 VITALS
HEIGHT: 74 IN | BODY MASS INDEX: 33.16 KG/M2 | WEIGHT: 258.38 LBS | HEART RATE: 92 BPM | OXYGEN SATURATION: 97 % | SYSTOLIC BLOOD PRESSURE: 124 MMHG | DIASTOLIC BLOOD PRESSURE: 80 MMHG

## 2022-06-20 DIAGNOSIS — B35.6 JOCK ITCH: ICD-10-CM

## 2022-06-20 DIAGNOSIS — E11.9 TYPE 2 DIABETES MELLITUS WITHOUT COMPLICATION, WITHOUT LONG-TERM CURRENT USE OF INSULIN: Primary | ICD-10-CM

## 2022-06-20 PROCEDURE — 1159F PR MEDICATION LIST DOCUMENTED IN MEDICAL RECORD: ICD-10-PCS | Mod: CPTII,,, | Performed by: INTERNAL MEDICINE

## 2022-06-20 PROCEDURE — 3008F PR BODY MASS INDEX (BMI) DOCUMENTED: ICD-10-PCS | Mod: CPTII,,, | Performed by: INTERNAL MEDICINE

## 2022-06-20 PROCEDURE — 99214 OFFICE O/P EST MOD 30 MIN: CPT | Mod: PBBFAC,PO | Performed by: INTERNAL MEDICINE

## 2022-06-20 PROCEDURE — 99213 PR OFFICE/OUTPT VISIT, EST, LEVL III, 20-29 MIN: ICD-10-PCS | Mod: S$PBB,,, | Performed by: INTERNAL MEDICINE

## 2022-06-20 PROCEDURE — 3074F SYST BP LT 130 MM HG: CPT | Mod: CPTII,,, | Performed by: INTERNAL MEDICINE

## 2022-06-20 PROCEDURE — 1160F PR REVIEW ALL MEDS BY PRESCRIBER/CLIN PHARMACIST DOCUMENTED: ICD-10-PCS | Mod: CPTII,,, | Performed by: INTERNAL MEDICINE

## 2022-06-20 PROCEDURE — 3052F PR MOST RECENT HEMOGLOBIN A1C LEVEL 8.0 - < 9.0%: ICD-10-PCS | Mod: CPTII,,, | Performed by: INTERNAL MEDICINE

## 2022-06-20 PROCEDURE — 3079F PR MOST RECENT DIASTOLIC BLOOD PRESSURE 80-89 MM HG: ICD-10-PCS | Mod: CPTII,,, | Performed by: INTERNAL MEDICINE

## 2022-06-20 PROCEDURE — 99999 PR PBB SHADOW E&M-EST. PATIENT-LVL IV: ICD-10-PCS | Mod: PBBFAC,,, | Performed by: INTERNAL MEDICINE

## 2022-06-20 PROCEDURE — 3074F PR MOST RECENT SYSTOLIC BLOOD PRESSURE < 130 MM HG: ICD-10-PCS | Mod: CPTII,,, | Performed by: INTERNAL MEDICINE

## 2022-06-20 PROCEDURE — 4010F PR ACE/ARB THEARPY RXD/TAKEN: ICD-10-PCS | Mod: CPTII,,, | Performed by: INTERNAL MEDICINE

## 2022-06-20 PROCEDURE — 1160F RVW MEDS BY RX/DR IN RCRD: CPT | Mod: CPTII,,, | Performed by: INTERNAL MEDICINE

## 2022-06-20 PROCEDURE — 3008F BODY MASS INDEX DOCD: CPT | Mod: CPTII,,, | Performed by: INTERNAL MEDICINE

## 2022-06-20 PROCEDURE — 3052F HG A1C>EQUAL 8.0%<EQUAL 9.0%: CPT | Mod: CPTII,,, | Performed by: INTERNAL MEDICINE

## 2022-06-20 PROCEDURE — 3079F DIAST BP 80-89 MM HG: CPT | Mod: CPTII,,, | Performed by: INTERNAL MEDICINE

## 2022-06-20 PROCEDURE — 99999 PR PBB SHADOW E&M-EST. PATIENT-LVL IV: CPT | Mod: PBBFAC,,, | Performed by: INTERNAL MEDICINE

## 2022-06-20 PROCEDURE — 1159F MED LIST DOCD IN RCRD: CPT | Mod: CPTII,,, | Performed by: INTERNAL MEDICINE

## 2022-06-20 PROCEDURE — 4010F ACE/ARB THERAPY RXD/TAKEN: CPT | Mod: CPTII,,, | Performed by: INTERNAL MEDICINE

## 2022-06-20 PROCEDURE — 99213 OFFICE O/P EST LOW 20 MIN: CPT | Mod: S$PBB,,, | Performed by: INTERNAL MEDICINE

## 2022-06-20 RX ORDER — CLOTRIMAZOLE AND BETAMETHASONE DIPROPIONATE 10; .64 MG/G; MG/G
CREAM TOPICAL
Qty: 45 G | Refills: 1 | Status: SHIPPED | OUTPATIENT
Start: 2022-06-20 | End: 2023-09-04 | Stop reason: SDUPTHER

## 2022-06-20 RX ORDER — LANCETS 33 GAUGE
EACH MISCELLANEOUS
Qty: 300 EACH | Refills: 3 | Status: SHIPPED | OUTPATIENT
Start: 2022-06-20 | End: 2023-07-25 | Stop reason: SDUPTHER

## 2022-06-20 NOTE — PROGRESS NOTES
Patient ID: Jose Boggs is a 46 y.o. male.    Chief Complaint: discuss diabetes       Assessment and Plan      Continue jardiance at current dose  Improve diet  Diabetes education.     1. Type 2 diabetes mellitus without complication, without long-term current use of insulin  Ambulatory referral/consult to Diabetes Education    ONETOUCH DELICA PLUS LANCET 33 gauge Misc    Hemoglobin A1C   2. Jock itch  clotrimazole-betamethasone 1-0.05% (LOTRISONE) cream      HPI     Type 2 diabetes, hypertension, Class 1 obesity, back pain     A1c trended up to 8.2. Reports getting off track with diet. Since a1c has improved it. He would like to try 3 more months of dietary improvement. He is open to seeing diabetes education again.     He would like refill on jock itch cream.       Review of Systems   Constitutional: Negative for fever.   Respiratory: Negative for shortness of breath.    Cardiovascular: Negative for chest pain.   Gastrointestinal: Negative for abdominal pain.        Vitals:    06/20/22 1440   BP: 124/80   Pulse: 92     Physical Exam  Cardiovascular:      Rate and Rhythm: Normal rate and regular rhythm.      Heart sounds: No murmur heard.    No gallop.   Pulmonary:      Breath sounds: Normal breath sounds. No wheezing or rhonchi.   Abdominal:      General: Bowel sounds are normal.      Palpations: Abdomen is soft.      Tenderness: There is no abdominal tenderness.   Musculoskeletal:         General: Normal range of motion.      Cervical back: Neck supple.   Skin:     General: Skin is warm.      Findings: No rash.   Neurological:      Mental Status: He is alert.   Psychiatric:         Behavior: Behavior normal.         I personally reviewed past medical, family and social history.  Current Outpatient Medications on File Prior to Visit   Medication Sig Dispense Refill    acyclovir 5% (ZOVIRAX) 5 % Crea Apply topically 5 (five) times daily. 5 g 1    atorvastatin (LIPITOR) 10 MG tablet Take 1 tablet (10 mg  total) by mouth once daily. 90 tablet 3    blood-glucose meter Misc Dispense one glucometer, brand of choice covered by insurance  dispense test strips, and lancets to match. Check glucose Daily 1 each prn    celecoxib (CELEBREX) 200 MG capsule Take 1 capsule (200 mg total) by mouth once daily. 90 capsule 3    clotrimazole (LOTRIMIN) 1 % cream       empagliflozin (JARDIANCE) 10 mg tablet Take 1 tablet (10 mg total) by mouth once daily. 90 tablet 1    olmesartan (BENICAR) 20 MG tablet Take 1 tablet (20 mg total) by mouth once daily. 90 tablet 1    omeprazole (PRILOSEC) 20 MG capsule Take 1 capsule (20 mg total) by mouth once daily. 90 capsule 1    omeprazole (PRILOSEC) 20 MG capsule TAKE 1 CAPSULE BY MOUTH EVERYDAY 90 capsule 1    ONETOUCH ULTRA BLUE TEST STRIP Strp TEST DAILY      [DISCONTINUED] clotrimazole-betamethasone 1-0.05% (LOTRISONE) cream APPLY TO AFFECTED AREA TWICE A DAY 15 g 7    [DISCONTINUED] ONETOUCH DELICA PLUS LANCET 33 gauge Misc USE TO TEST BLOOD SUGAR DAILY      valACYclovir (VALTREX) 1000 MG tablet Take 1 tablet (1,000 mg total) by mouth 3 (three) times daily. for 7 days 21 tablet 5     No current facility-administered medications on file prior to visit.

## 2022-08-17 ENCOUNTER — PATIENT OUTREACH (OUTPATIENT)
Dept: ADMINISTRATIVE | Facility: HOSPITAL | Age: 47
End: 2022-08-17
Payer: MEDICAID

## 2022-08-29 ENCOUNTER — CLINICAL SUPPORT (OUTPATIENT)
Dept: DIABETES | Facility: CLINIC | Age: 47
End: 2022-08-29
Payer: MEDICAID

## 2022-08-29 DIAGNOSIS — E11.9 TYPE 2 DIABETES MELLITUS WITHOUT COMPLICATION, WITHOUT LONG-TERM CURRENT USE OF INSULIN: ICD-10-CM

## 2022-08-29 PROCEDURE — G0108 DIAB MANAGE TRN  PER INDIV: HCPCS | Mod: 95,,, | Performed by: DIETITIAN, REGISTERED

## 2022-08-29 PROCEDURE — G0108 PR DIAB MANAGE TRN  PER INDIV: ICD-10-PCS | Mod: 95,,, | Performed by: DIETITIAN, REGISTERED

## 2022-08-29 NOTE — PROGRESS NOTES
"Diabetes Care Specialist Virtual Visit Note   It was in the patient's best interest to receive diabetes self-management education and support services in this format to prevent the exposure to COVID-19.        The patient location is: home in [place]  The chief complaint leading to consultation is: Diabetes  Visit type: audiovisual  Total time spent with patient: 60 min   Each patient to whom he or she provides medical services by telemedicine is:  (1) informed of the relationship between the physician and patient and the respective role of any other health care provider with respect to management of the patient; and (2) notified that he or she may decline to receive medical services by telemedicine and may withdraw from such care at any time.     Diabetes Care Specialist Progress Note  Author: Melisa Edmondson RD  Date: 8/29/2022    Program Intake  Reason for Diabetes Program Visit:: Initial Diabetes Assessment  Current diabetes risk level:: moderate  In the last 12 months, have you:: none  Permission to speak with others about care:: yes (Sravanthi Boggs-wife)    Lab Results   Component Value Date    HGBA1C 8.2 (H) 06/10/2022       Clinical    Patient Health Rating  Compared to other people your age, how would you rate your health?: Good    Problem Review  Reviewed Problem List with Patient: no (see comments)  Active comorbidities affecting diabetes self-care.: yes  Comorbidities: Hypertension    Clinical Assessment  Current Diabetes Treatment: Oral Medication  Have you ever experienced hypoglycemia (low blood sugar)?: no  Have you ever experienced hyperglycemia (high blood sugar)?: yes  In the last month, how often have you experienced high blood sugar?: other (see comments) (was 230 last week, States "felt bad")  Are you able to tell when your blood sugar is high?: Yes  What are your symptoms?: fatigue  Have you ever been hospitalized because your blood sugar was high?: no    Medication Information  How do " you obtain your medications?: Patient drives  How many days a week do you miss your medications?: Never  Medication adherence impacting ability to self-manage diabetes?: No    Labs  Do you have regular lab work to monitor your medications?: Yes  Type of Regular Lab Work: CBC, BMP, A1c  Where do you get your labs drawn?: [facility]  Lab Compliance Barriers: No    Nutritional Status  Diet: Regular  Meal Plan 24 Hour Recall: Breakfast, Lunch, Dinner, Snack (is drinking more water, trying to drink 2-3 bottles of water a day, loves sweet tea and is cutting back)  Meal Plan 24 Hour Recall - Breakfast: nutrigran apple bar, peanut butter crackers, liriano, egg and cheese crossaint from [employer] with coffee with 1 tsp of sugar  Meal Plan 24 Hour Recall - Lunch: lean cusines at work with sweet tea and water, gets very hungry around 4  Meal Plan 24 Hour Recall - Dinner: wife cooks dinner, last night ate 3 hot dogs with sweet tea  Meal Plan 24 Hour Recall - Snack: nutrigrain bars, sugar free Rustle [employer] candies, apples, smarties, craves sweets, trying to cut back, usually eats a snack before dinner  Change in appetite?: No  Dentation:: Intact  Recent Changes in Weight: Weight Loss  Was weight loss intentional or unintentional?: Intentional (went from 291 to 258lbs, since starting jardiance about 2-3 months ago. STates he has plateaued)  Current nutritional status an area of need that is impacting patient's ability to self-manage diabetes?: No    Additional Social History    Support  Does anyone support you with your diabetes care?: yes  Who supports you?: spouse  Who takes you to your medical appointments?: self  Does the current support meet the patient's needs?: Yes  Is Support an area impacting ability to self-manage diabetes?: No    Access to Mass Media & Technology  Does the patient have access to any of the following devices or technologies?: Smart phone, Internet Access  Media or technology needs impacting ability to  self-manage diabetes?: No    Cognitive/Behavioral Health  Alert and Oriented: Yes  Difficulty Thinking: No  Requires Prompting: No  Requires assistance for routine expression?: No  Cognitive or behavioral barriers impacting ability to self-manage diabetes?: No    Culture/Scientologist  Culture or Amish beliefs that may impact ability to access healthcare: No    Communication  Language preference: English  Hearing Problems: No  Vision Problems: Yes  Vision problem type:: Decreased Vision  Vision Assistance: Glasses  Communication needs impacting ability to self-manage diabetes?: No    Health Literacy  Preferred Learning Method: Demonstration, Hands On  How often do you need to have someone help you read instructions, pamphlets, or written material from your doctor or pharmacy?: Never  Health literacy needs impacting ability to self-manage diabetes?: No      Diabetes Self-Management Skills Assessment    Diabetes Disease Process/Treatment Options  Patient/caregiver able to state what happens when someone has diabetes.: no  Patient/caregiver knows what type of diabetes they have.: yes  Diabetes Type : Type II  Patient/caregiver able to identify at least three signs and symptoms of diabetes.: no  Patient able to identify at least three risk factors for diabetes.: yes  Identified risk factors:: family history  Diabetes Disease Process/Treatment Options: Skills Assessment Completed: Yes  Assessment indicates:: Knowledge deficit, Instruction Needed  Area of need?: Yes    Nutrition/Healthy Eating  Challenges to healthy eating:: portion control, lack of will power, snacking between meals and at night  Method of carbohydrate measurement:: no method  Patient can identify foods that impact blood sugar.: yes  Patient-identified foods:: sweets, starches (bread, pasta, rice, cereal)  Nutrition/Healthy Eating Skills Assessment Completed:: Yes  Assessment indicates:: Knowledge deficit, Instruction Needed  Area of need?:  "Yes    Physical Activity/Exercise  Patient's daily activity level:: moderately active  Patient formally exercises outside of work.: no  Patient can identify forms of physical activity.: no  Patient can identify reasons why exercise/physical activity is important in diabetes management.: no  Physical Activity/Exercise Skills Assessment Completed: : Yes  Assessment indicates:: Knowledge deficit, Instruction Needed  Area of need?: Yes    Medications  Patient is able to describe current diabetes management routine.: yes  Diabetes management routine:: oral medications  Patient is able to identify current diabetes medications, dosages, and appropriate timing of medications.: yes  Patient understands the purpose of the medications taken for diabetes.: no  Patient reports problems or concerns with current medication regimen.: no  Medication Skills Assessment Completed:: Yes  Assessment indicates:: Knowledge deficit, Instruction Needed  Area of need?: Yes    Home Blood Glucose Monitoring  Patient states that blood sugar is checked at home daily.: yes  Monitoring Method:: home glucometer  How often do you check your blood sugar?: Once a day  When do you check your blood sugar?: Before breakfast  When you check what is your typical blood sugar range? : fasting: "in the high 150's", last week was in the 170's for a few days, now back down to 150's  Blood glucose logs:: encouraged to keep logs, no, encouraged to bring logs to provider visits  Blood glucose logs reviewed today?: no  Home Blood Glucose Monitoring Skills Assessment Completed: : Yes  Assessment indicates:: Knowledge deficit, Instruction Needed  Area of need?: Yes    Acute Complications  Patient is able to identify types of acute complications: No  Acute Complications Skills Assessment Completed: : Yes  Assessment indicates:: Knowledge deficit, Instruction Needed  Area of need?: Yes    Chronic Complications  Patient can identify major chronic complications of " diabetes.: no  Patient can identify ways to prevent or delay diabetes complications.: no  Patient is aware that having diabetes increases risk of heart disease?: No  Patient is aware that heart disease is the leading cause of death and disability in people with diabetes?: No  Patient able to state risk factors for heart disease?: No  Patient is taking statin?: Yes  Chronic Complications Skills Assessment Completed: : Yes  Assessment indicates:: Knowledge deficit, Instruction Needed  Area of need?: Yes    Psychosocial/Coping  Patient can identify ways of coping with chronic disease.: yes  Patient-stated ways of coping with chronic disease:: support from loved ones  Psychosocial/Coping Skills Assessment Completed: : Yes  Assessment indicates:: Adequate understanding  Area of need?: No      Diabetes Self Support Plan         Assessment Summary and Plan    Based on today's diabetes care assessment, the following areas of need were identified:      Social 8/29/2022   Support No   Access to Mass Media/Tech No   Cognitive/Behavioral Health No   Culture/Confucianism No   Communication No   Health Literacy No        Clinical 8/29/2022   Medication Adherence No   Lab Compliance No   Nutritional Status No        Diabetes Self-Management Skills 8/29/2022   Diabetes Disease Process/Treatment Options Yes-ed on risk factors associated with DM   Nutrition/Healthy Eating Yes-see care plan   Physical Activity/Exercise Yes-time deferred   Medication Yes-taking meds as ordered.  Suggested he increase his jardiance. Ed on injectable therapy improved A1c control and weight loss benefits. He will discuss with his doctor. He is open to weight loss benefits of ozempic if covered by insurance. He has lost weight with Jardiance.    Home Blood Glucose Monitoring Yes-see care plan   Acute Complications Yes-time deferred   Chronic Complications Yes-time deferred   Psychosocial/Coping No          Today's interventions were provided through  individual discussion, instruction, and written materials were provided.      Patient verbalized understanding of instruction and written materials.  Pt was able to return back demonstration of instructions today. Patient understood key points, needs reinforcement and further instruction.     Diabetes Self-Management Care Plan:    Today's Diabetes Self-Management Care Plan was developed with Jose's input. Jose has agreed to work toward the following goal(s) to improve his/her overall diabetes control.      Care Plan: Diabetes Management   Updates made since 7/30/2022 12:00 AM        Problem: Healthy Eating         Goal: Limit carbs to 60 per meal or less, omit sweet beverages from diet.    Start Date: 8/29/2022   Expected End Date: 10/3/2022   Priority: High   Barriers: Other (comments)   Note:    8/29/22-loves sweet tea, also drinks [employer] frapachino twice a week. Jake Parker sugar free candies. Craves sweets and is eating Has made some changes and has lost weight since starting Jardiance. Weight loss has plateaued. He agrees it will be hard to omit the sweet beverages but is willing to try.       Task: Reviewed the sources and role of Carbohydrate, Protein, and Fat and how each nutrient impacts blood sugar. Completed 8/29/2022        Task: Provided visual examples using dry measuring cups, food models, and other familiar objects such as computer mouse, deck or cards, tennis ball etc. to help with visualization of portions. Completed 8/29/2022        Task: Explained how to count carbohydrates using the food label and the use of dry measuring cups for accurate carb counting. Completed 8/29/2022        Task: Discussed strategies for choosing healthier menu options when dining out.         Task: Recommended replacing beverages containing high sugar content with noncaloric/sugar free options and/or water. Completed 8/29/2022        Task: Review the importance of balancing carbohydrates with each meal using  portion control techniques to count servings of carbohydrate and label reading to identify serving size and amount of total carbs per serving.         Problem: Blood Glucose Self-Monitoring         Goal: Patient agrees to continue to check and record blood sugars 1 time per day.    Start Date: 8/29/2022   Expected End Date: 10/3/2022   Priority: Medium   Barriers: No Barriers Identified   Note:    8/29/22-ed on target BG ranges. Bg are not in range.       Task: Provided patient with a meter today and sent Rx request to provider to send to patients pharmacy.         Task: Reviewed the importance of self-monitoring blood glucose and keeping logs. Completed 8/29/2022        Task: Instructed on how to self-monitor blood glucose using a home glucometer, how to properly dispose of used strips and lancets after use, and how to appropriately store meter and supplies.         Task: Provided patient with blood glucose logs, reviewed appropriate timing and frequency to SMBG, education on parameters on when to notify provider and advised patient to bring logs to all appts with PCP/Endocrinologist/Diabetes Care Specialist.         Task: Discussed ways to minimize pain when monitoring blood glucose.           Follow Up Plan     No follow-ups on file.    Today's care plan and follow up schedule was discussed with patient.  Jose verbalized understanding of the care plan, goals, and agrees to follow up plan.        The patient was encouraged to communicate with his/her health care provider/physician and care team regarding his/her condition(s) and treatment.  I provided the patient with my contact information today and encouraged to contact me via phone or [facility] Patient Portal as needed.     Length of Visit   Total Time: 60 Minutes

## 2022-09-01 ENCOUNTER — PATIENT MESSAGE (OUTPATIENT)
Dept: FAMILY MEDICINE | Facility: CLINIC | Age: 47
End: 2022-09-01
Payer: MEDICAID

## 2022-09-01 DIAGNOSIS — E11.9 TYPE 2 DIABETES MELLITUS WITHOUT COMPLICATION, WITHOUT LONG-TERM CURRENT USE OF INSULIN: ICD-10-CM

## 2022-09-01 NOTE — TELEPHONE ENCOUNTER
No new care gaps identified.  [facility] Embedded Care Gaps. Reference number: [id number]. 9/01/2022   3:23:38 PM CDT

## 2022-09-10 ENCOUNTER — LAB VISIT (OUTPATIENT)
Dept: LAB | Facility: HOSPITAL | Age: 47
End: 2022-09-10
Attending: INTERNAL MEDICINE
Payer: MEDICAID

## 2022-09-10 DIAGNOSIS — E11.9 TYPE 2 DIABETES MELLITUS WITHOUT COMPLICATION, WITHOUT LONG-TERM CURRENT USE OF INSULIN: ICD-10-CM

## 2022-09-10 LAB
ALBUMIN/CREAT UR: 8.2 UG/MG (ref 0–30)
CREAT UR-MCNC: 147 MG/DL (ref 23–375)
MICROALBUMIN UR DL<=1MG/L-MCNC: 12 UG/ML

## 2022-09-10 PROCEDURE — 82570 ASSAY OF URINE CREATININE: CPT | Performed by: INTERNAL MEDICINE

## 2022-09-10 PROCEDURE — 82043 UR ALBUMIN QUANTITATIVE: CPT | Performed by: INTERNAL MEDICINE

## 2022-09-29 DIAGNOSIS — E11.69 TYPE 2 DIABETES MELLITUS WITH OTHER SPECIFIED COMPLICATION, UNSPECIFIED WHETHER LONG TERM INSULIN USE: Primary | ICD-10-CM

## 2022-10-05 ENCOUNTER — TELEPHONE (OUTPATIENT)
Dept: ADMINISTRATIVE | Facility: OTHER | Age: 47
End: 2022-10-05
Payer: MEDICAID

## 2022-10-05 NOTE — TELEPHONE ENCOUNTER
Left voice message for patient to return call to schedule appointment from referral to Diabetes Education department..  uRth VASQUEZ [phone]

## 2022-10-14 DIAGNOSIS — E11.65 UNCONTROLLED TYPE 2 DIABETES MELLITUS WITH HYPERGLYCEMIA: ICD-10-CM

## 2022-10-15 NOTE — TELEPHONE ENCOUNTER
Refill Decision Note   Jose Boggs  is requesting a refill authorization.  Brief Assessment and Rationale for Refill:  Approve    -Medication-Related Problems Identified: Requires labs  Medication Therapy Plan:       Medication Reconciliation Completed: No   Comments:     Provider Staff:     Action is required for this patient.   Please see care gap opportunities below in Care Due Message.     Thanks!  [facility]     Appointments      Date Provider   Last Visit   6/20/2022 Xavier Fernandez,    Next Visit   12/20/2022 Xavier Fernandez,      Note composed:1:16 PM 10/15/2022           Note composed:1:16 PM 10/15/2022

## 2022-10-15 NOTE — TELEPHONE ENCOUNTER
Care Due:                  Date            Visit Type   Department     Provider  --------------------------------------------------------------------------------                                EP -                              [facility] FAMILY  Last Visit: 06-      CARE ([facility])   MARKUS Fernandez                              EP -                              PRIMARY      [facility] FAM[facility]  Next Visit: 12-      CARE ([facility])   MARKUS Fernandez                                                            Last  Test          Frequency    Reason                     Performed    Due Date  --------------------------------------------------------------------------------    CMP.........  12 months..  atorvastatin.............  12- 12-    Health [facility] Embedded Care Gaps. Reference number: [id number]. 10/14/2022   9:59:41 PM CDT

## 2022-10-17 ENCOUNTER — PATIENT MESSAGE (OUTPATIENT)
Dept: FAMILY MEDICINE | Facility: CLINIC | Age: 47
End: 2022-10-17
Payer: MEDICAID

## 2022-11-19 RX ORDER — OLMESARTAN MEDOXOMIL 20 MG/1
20 TABLET ORAL DAILY
Qty: 90 TABLET | Refills: 1 | Status: CANCELLED | OUTPATIENT
Start: 2022-11-19

## 2022-11-20 NOTE — TELEPHONE ENCOUNTER
No new care gaps identified.  [facility] Embedded Care Gaps. Reference number: [id number]. 11/19/2022   8:22:40 PM CST

## 2022-11-21 ENCOUNTER — PATIENT MESSAGE (OUTPATIENT)
Dept: FAMILY MEDICINE | Facility: CLINIC | Age: 47
End: 2022-11-21
Payer: MEDICAID

## 2022-11-21 DIAGNOSIS — I10 PRIMARY HYPERTENSION: Primary | ICD-10-CM

## 2022-11-21 RX ORDER — OLMESARTAN MEDOXOMIL 20 MG/1
20 TABLET ORAL DAILY
Qty: 90 TABLET | Refills: 1 | Status: CANCELLED | OUTPATIENT
Start: 2022-11-19

## 2022-11-21 RX ORDER — OLMESARTAN MEDOXOMIL 20 MG/1
20 TABLET ORAL DAILY
Qty: 90 TABLET | Refills: 3 | Status: SHIPPED | OUTPATIENT
Start: 2022-11-21 | End: 2023-01-30 | Stop reason: SDUPTHER

## 2022-11-21 NOTE — TELEPHONE ENCOUNTER
No new care gaps identified.  [facility] Embedded Care Gaps. Reference number: [id number]. 11/21/2022   3:28:27 PM CST

## 2022-11-21 NOTE — TELEPHONE ENCOUNTER
Refill request was already forwarded to you by our pharmacy. Pt is going out of town on tomorrow. Please advise

## 2022-11-21 NOTE — TELEPHONE ENCOUNTER
No new care gaps identified.  [facility] Embedded Care Gaps. Reference number: [id number]. 11/21/2022   1:17:29 PM CST

## 2022-12-17 ENCOUNTER — LAB VISIT (OUTPATIENT)
Dept: LAB | Facility: HOSPITAL | Age: 47
End: 2022-12-17
Attending: INTERNAL MEDICINE
Payer: MEDICAID

## 2022-12-17 DIAGNOSIS — E11.9 TYPE 2 DIABETES MELLITUS WITHOUT COMPLICATION, WITHOUT LONG-TERM CURRENT USE OF INSULIN: ICD-10-CM

## 2022-12-17 LAB
ESTIMATED AVG GLUCOSE: 157 MG/DL (ref 68–131)
HBA1C MFR BLD: 7.1 % (ref 4–5.6)

## 2022-12-17 PROCEDURE — 83036 HEMOGLOBIN GLYCOSYLATED A1C: CPT | Performed by: INTERNAL MEDICINE

## 2022-12-17 PROCEDURE — 36415 COLL VENOUS BLD VENIPUNCTURE: CPT | Mod: PO | Performed by: INTERNAL MEDICINE

## 2022-12-20 ENCOUNTER — OFFICE VISIT (OUTPATIENT)
Dept: FAMILY MEDICINE | Facility: CLINIC | Age: 47
End: 2022-12-20
Payer: MEDICAID

## 2022-12-20 VITALS
HEART RATE: 79 BPM | OXYGEN SATURATION: 96 % | WEIGHT: 259.69 LBS | DIASTOLIC BLOOD PRESSURE: 80 MMHG | BODY MASS INDEX: 33.33 KG/M2 | SYSTOLIC BLOOD PRESSURE: 120 MMHG | HEIGHT: 74 IN

## 2022-12-20 DIAGNOSIS — K21.9 GASTROESOPHAGEAL REFLUX DISEASE, UNSPECIFIED WHETHER ESOPHAGITIS PRESENT: ICD-10-CM

## 2022-12-20 DIAGNOSIS — I10 PRIMARY HYPERTENSION: Primary | ICD-10-CM

## 2022-12-20 DIAGNOSIS — E11.9 TYPE 2 DIABETES MELLITUS WITHOUT COMPLICATION, WITHOUT LONG-TERM CURRENT USE OF INSULIN: ICD-10-CM

## 2022-12-20 DIAGNOSIS — E66.9 CLASS 1 OBESITY: ICD-10-CM

## 2022-12-20 PROCEDURE — 3066F PR DOCUMENTATION OF TREATMENT FOR NEPHROPATHY: ICD-10-PCS | Mod: CPTII,,, | Performed by: INTERNAL MEDICINE

## 2022-12-20 PROCEDURE — 4010F ACE/ARB THERAPY RXD/TAKEN: CPT | Mod: CPTII,,, | Performed by: INTERNAL MEDICINE

## 2022-12-20 PROCEDURE — 3074F PR MOST RECENT SYSTOLIC BLOOD PRESSURE < 130 MM HG: ICD-10-PCS | Mod: CPTII,,, | Performed by: INTERNAL MEDICINE

## 2022-12-20 PROCEDURE — 3008F PR BODY MASS INDEX (BMI) DOCUMENTED: ICD-10-PCS | Mod: CPTII,,, | Performed by: INTERNAL MEDICINE

## 2022-12-20 PROCEDURE — 99214 PR OFFICE/OUTPT VISIT, EST, LEVL IV, 30-39 MIN: ICD-10-PCS | Mod: S$PBB,,, | Performed by: INTERNAL MEDICINE

## 2022-12-20 PROCEDURE — 3079F DIAST BP 80-89 MM HG: CPT | Mod: CPTII,,, | Performed by: INTERNAL MEDICINE

## 2022-12-20 PROCEDURE — 99999 PR PBB SHADOW E&M-EST. PATIENT-LVL IV: CPT | Mod: PBBFAC,,, | Performed by: INTERNAL MEDICINE

## 2022-12-20 PROCEDURE — 3008F BODY MASS INDEX DOCD: CPT | Mod: CPTII,,, | Performed by: INTERNAL MEDICINE

## 2022-12-20 PROCEDURE — 3061F NEG MICROALBUMINURIA REV: CPT | Mod: CPTII,,, | Performed by: INTERNAL MEDICINE

## 2022-12-20 PROCEDURE — 99214 OFFICE O/P EST MOD 30 MIN: CPT | Mod: PBBFAC,PO | Performed by: INTERNAL MEDICINE

## 2022-12-20 PROCEDURE — 3061F PR NEG MICROALBUMINURIA RESULT DOCUMENTED/REVIEW: ICD-10-PCS | Mod: CPTII,,, | Performed by: INTERNAL MEDICINE

## 2022-12-20 PROCEDURE — 1159F MED LIST DOCD IN RCRD: CPT | Mod: CPTII,,, | Performed by: INTERNAL MEDICINE

## 2022-12-20 PROCEDURE — 3079F PR MOST RECENT DIASTOLIC BLOOD PRESSURE 80-89 MM HG: ICD-10-PCS | Mod: CPTII,,, | Performed by: INTERNAL MEDICINE

## 2022-12-20 PROCEDURE — 99999 PR PBB SHADOW E&M-EST. PATIENT-LVL IV: ICD-10-PCS | Mod: PBBFAC,,, | Performed by: INTERNAL MEDICINE

## 2022-12-20 PROCEDURE — 3051F PR MOST RECENT HEMOGLOBIN A1C LEVEL 7.0 - < 8.0%: ICD-10-PCS | Mod: CPTII,,, | Performed by: INTERNAL MEDICINE

## 2022-12-20 PROCEDURE — 1160F RVW MEDS BY RX/DR IN RCRD: CPT | Mod: CPTII,,, | Performed by: INTERNAL MEDICINE

## 2022-12-20 PROCEDURE — 4010F PR ACE/ARB THEARPY RXD/TAKEN: ICD-10-PCS | Mod: CPTII,,, | Performed by: INTERNAL MEDICINE

## 2022-12-20 PROCEDURE — 1159F PR MEDICATION LIST DOCUMENTED IN MEDICAL RECORD: ICD-10-PCS | Mod: CPTII,,, | Performed by: INTERNAL MEDICINE

## 2022-12-20 PROCEDURE — 3074F SYST BP LT 130 MM HG: CPT | Mod: CPTII,,, | Performed by: INTERNAL MEDICINE

## 2022-12-20 PROCEDURE — 1160F PR REVIEW ALL MEDS BY PRESCRIBER/CLIN PHARMACIST DOCUMENTED: ICD-10-PCS | Mod: CPTII,,, | Performed by: INTERNAL MEDICINE

## 2022-12-20 PROCEDURE — 3066F NEPHROPATHY DOC TX: CPT | Mod: CPTII,,, | Performed by: INTERNAL MEDICINE

## 2022-12-20 PROCEDURE — 99214 OFFICE O/P EST MOD 30 MIN: CPT | Mod: S$PBB,,, | Performed by: INTERNAL MEDICINE

## 2022-12-20 PROCEDURE — 3051F HG A1C>EQUAL 7.0%<8.0%: CPT | Mod: CPTII,,, | Performed by: INTERNAL MEDICINE

## 2022-12-20 NOTE — PROGRESS NOTES
Subjective     Patient ID: Jose Boggs is a 46 y.o. male.    Chief Complaint: Annual Exam (Routine check up from blood sugar )      HPI:    Annual.     Here for annual.  His diabetes has improved.  His A1c is 7.1 was 7.5 and 8.2.  He is tolerating Jardiance.  We did discuss mounjaro or ozempic for more weight loss and better glycemic control.  He has seen Diabetes Education and he did learn a few things to help with his diet.  Blood pressure has been controlled on Benicar 20 mg, he is tolerating this.  Cholesterol stable on atorvastatin 10 mg.  He is tolerating this well.  GERD controlled on omeprazole.    Labs-reviewed  Diet-discussed  Exercise-does a lot of walking at work.  No formal exercise.  Alcohol-none  Tobacco-none  HM- declines flu shot.     Review of Systems   Constitutional:  Negative for fever.   Respiratory:  Negative for shortness of breath.    Cardiovascular:  Negative for chest pain.   Gastrointestinal:  Negative for abdominal pain.   Endocrine: Positive for polyuria.      Objective     Wt Readings from Last 3 Encounters:   12/20/22 1608 117.8 kg (259 lb 11.2 oz)   06/20/22 1440 117.2 kg (258 lb 6.1 oz)   03/10/22 1507 116.5 kg (256 lb 13.4 oz)      Body mass index is 33.34 kg/m².   Vitals:    12/20/22 1608   BP: 120/80   Pulse: 79     Physical Exam  Cardiovascular:      Rate and Rhythm: Normal rate and regular rhythm.      Heart sounds: No murmur heard.    No gallop.   Pulmonary:      Breath sounds: Normal breath sounds. No wheezing or rhonchi.   Abdominal:      Palpations: Abdomen is soft.      Tenderness: There is no abdominal tenderness.   Musculoskeletal:         General: Normal range of motion.      Cervical back: Neck supple.   Skin:     General: Skin is warm.      Findings: No rash.   Neurological:      Mental Status: He is alert.   Psychiatric:         Behavior: Behavior normal.     Protective Sensation (w/ 10 gram monofilament):  Right: Intact  Left: Intact    Visual  Inspection:  Normal -  Bilateral    Pedal Pulses:   Right: Present  Left: Present    Posterior tibialis:   Right:Present  Left: Present      Assessment and plan     Continue current medications for now. Will see about switching from Jardiance to mounjaro.     Diagnosis and orders   Jose was seen today for annual exam.    Diagnoses and all orders for this visit:    Primary hypertension  -     Comprehensive Metabolic Panel; Future  -     TSH; Future    Type 2 diabetes mellitus without complication, without long-term current use of insulin  -     Hemoglobin A1C; Future  -     Lipid Panel; Future    Class 1 obesity    Gastroesophageal reflux disease, unspecified whether esophagitis present  -     CBC Auto Differential; Future          Diabetes Medications               empagliflozin (JARDIANCE) 10 mg tablet Take 1 tablet (10 mg total) by mouth once daily.           Hypertension Medications               olmesartan (BENICAR) 20 MG tablet Take 1 tablet (20 mg total) by mouth once daily.           Hyperlipidemia Medications               atorvastatin (LIPITOR) 10 MG tablet Take 1 tablet (10 mg total) by mouth once daily.           Medication List with Changes/Refills   Current Medications    ACYCLOVIR 5% (ZOVIRAX) 5 % CREA    Apply topically 5 (five) times daily.    ATORVASTATIN (LIPITOR) 10 MG TABLET    Take 1 tablet (10 mg total) by mouth once daily.    BLOOD SUGAR DIAGNOSTIC STRP    Use to check blood sugar 3 times daily    BLOOD-GLUCOSE METER MISC    Dispense one glucometer, brand of choice covered by insurance  dispense test strips, and lancets to match. Check glucose Daily    BLOOD-GLUCOSE METER MISC    Use as directed to check blood glucose    CELECOXIB (CELEBREX) 200 MG CAPSULE    Take 1 capsule (200 mg total) by mouth once daily.    CLOTRIMAZOLE (LOTRIMIN) 1 % CREAM        CLOTRIMAZOLE-BETAMETHASONE 1-0.05% (LOTRISONE) CREAM    APPLY TO AFFECTED AREA TWICE A DAY    EMPAGLIFLOZIN (JARDIANCE) 10 MG TABLET    Take 1  tablet (10 mg total) by mouth once daily.    OLMESARTAN (BENICAR) 20 MG TABLET    Take 1 tablet (20 mg total) by mouth once daily.    OMEPRAZOLE (PRILOSEC) 20 MG CAPSULE    Take 1 capsule (20 mg total) by mouth once daily.    ONETOUCH DELICA PLUS LANCET 33 GAUGE MISC    USE TO TEST BLOOD SUGAR DAILY    ONETOUCH ULTRA BLUE TEST STRIP STRP    TEST DAILY    VALACYCLOVIR (VALTREX) 1000 MG TABLET    Take 1 tablet (1,000 mg total) by mouth 3 (three) times daily. for 7 days       I personally reviewed past medical, family and social history.

## 2022-12-21 ENCOUNTER — PATIENT MESSAGE (OUTPATIENT)
Dept: FAMILY MEDICINE | Facility: CLINIC | Age: 47
End: 2022-12-21
Payer: MEDICAID

## 2023-01-18 ENCOUNTER — PATIENT MESSAGE (OUTPATIENT)
Dept: ADMINISTRATIVE | Facility: HOSPITAL | Age: 48
End: 2023-01-18
Payer: MEDICAID

## 2023-02-04 DIAGNOSIS — M51.9 LUMBAR DISC DISEASE: ICD-10-CM

## 2023-02-05 RX ORDER — CELECOXIB 200 MG/1
200 CAPSULE ORAL DAILY
Qty: 90 CAPSULE | Refills: 0 | Status: SHIPPED | OUTPATIENT
Start: 2023-02-05 | End: 2023-04-29 | Stop reason: SDUPTHER

## 2023-02-08 ENCOUNTER — TELEPHONE (OUTPATIENT)
Dept: PHARMACY | Facility: CLINIC | Age: 48
End: 2023-02-08
Payer: MEDICAID

## 2023-02-23 ENCOUNTER — PATIENT MESSAGE (OUTPATIENT)
Dept: FAMILY MEDICINE | Facility: CLINIC | Age: 48
End: 2023-02-23
Payer: MEDICAID

## 2023-02-23 DIAGNOSIS — E11.65 UNCONTROLLED TYPE 2 DIABETES MELLITUS WITH HYPERGLYCEMIA: ICD-10-CM

## 2023-02-23 NOTE — TELEPHONE ENCOUNTER
No new care gaps identified.  [facility] Embedded Care Gaps. Reference number: [id number]. 2/23/2023   10:22:07 AM CST

## 2023-02-23 NOTE — TELEPHONE ENCOUNTER
No new care gaps identified.  [facility] Embedded Care Gaps. Reference number: [id number]. 2/23/2023   10:55:56 AM CST

## 2023-02-24 ENCOUNTER — PATIENT MESSAGE (OUTPATIENT)
Dept: ADMINISTRATIVE | Facility: HOSPITAL | Age: 48
End: 2023-02-24
Payer: MEDICAID

## 2023-02-25 ENCOUNTER — LAB VISIT (OUTPATIENT)
Dept: LAB | Facility: HOSPITAL | Age: 48
End: 2023-02-25
Attending: INTERNAL MEDICINE
Payer: MEDICAID

## 2023-02-25 DIAGNOSIS — Z12.11 SCREENING FOR COLON CANCER: ICD-10-CM

## 2023-02-25 DIAGNOSIS — I10 PRIMARY HYPERTENSION: ICD-10-CM

## 2023-02-25 DIAGNOSIS — K21.9 GASTROESOPHAGEAL REFLUX DISEASE, UNSPECIFIED WHETHER ESOPHAGITIS PRESENT: ICD-10-CM

## 2023-02-25 DIAGNOSIS — E11.9 TYPE 2 DIABETES MELLITUS WITHOUT COMPLICATION, WITHOUT LONG-TERM CURRENT USE OF INSULIN: ICD-10-CM

## 2023-02-25 LAB
ALBUMIN SERPL BCP-MCNC: 3.8 G/DL (ref 3.5–5.2)
ALP SERPL-CCNC: 55 U/L (ref 55–135)
ALT SERPL W/O P-5'-P-CCNC: 22 U/L (ref 10–44)
ANION GAP SERPL CALC-SCNC: 8 MMOL/L (ref 8–16)
AST SERPL-CCNC: 19 U/L (ref 10–40)
BASOPHILS # BLD AUTO: 0.07 K/UL (ref 0–0.2)
BASOPHILS NFR BLD: 0.8 % (ref 0–1.9)
BILIRUB SERPL-MCNC: 0.9 MG/DL (ref 0.1–1)
BUN SERPL-MCNC: 8 MG/DL (ref 6–20)
CALCIUM SERPL-MCNC: 9.7 MG/DL (ref 8.7–10.5)
CHLORIDE SERPL-SCNC: 103 MMOL/L (ref 95–110)
CHOLEST SERPL-MCNC: 127 MG/DL (ref 120–199)
CHOLEST/HDLC SERPL: 3.3 {RATIO} (ref 2–5)
CO2 SERPL-SCNC: 28 MMOL/L (ref 23–29)
CREAT SERPL-MCNC: 1.1 MG/DL (ref 0.5–1.4)
DIFFERENTIAL METHOD: NORMAL
EOSINOPHIL # BLD AUTO: 0.3 K/UL (ref 0–0.5)
EOSINOPHIL NFR BLD: 2.9 % (ref 0–8)
ERYTHROCYTE [DISTWIDTH] IN BLOOD BY AUTOMATED COUNT: 12.4 % (ref 11.5–14.5)
EST. GFR  (NO RACE VARIABLE): >60 ML/MIN/1.73 M^2
ESTIMATED AVG GLUCOSE: 157 MG/DL (ref 68–131)
GLUCOSE SERPL-MCNC: 136 MG/DL (ref 70–110)
HBA1C MFR BLD: 7.1 % (ref 4–5.6)
HCT VFR BLD AUTO: 49.1 % (ref 40–54)
HDLC SERPL-MCNC: 38 MG/DL (ref 40–75)
HDLC SERPL: 29.9 % (ref 20–50)
HGB BLD-MCNC: 16.6 G/DL (ref 14–18)
IMM GRANULOCYTES # BLD AUTO: 0.01 K/UL (ref 0–0.04)
IMM GRANULOCYTES NFR BLD AUTO: 0.1 % (ref 0–0.5)
LDLC SERPL CALC-MCNC: 65.6 MG/DL (ref 63–159)
LYMPHOCYTES # BLD AUTO: 3.5 K/UL (ref 1–4.8)
LYMPHOCYTES NFR BLD: 40.9 % (ref 18–48)
MCH RBC QN AUTO: 31 PG (ref 27–31)
MCHC RBC AUTO-ENTMCNC: 33.8 G/DL (ref 32–36)
MCV RBC AUTO: 92 FL (ref 82–98)
MONOCYTES # BLD AUTO: 0.6 K/UL (ref 0.3–1)
MONOCYTES NFR BLD: 7.4 % (ref 4–15)
NEUTROPHILS # BLD AUTO: 4.1 K/UL (ref 1.8–7.7)
NEUTROPHILS NFR BLD: 47.9 % (ref 38–73)
NONHDLC SERPL-MCNC: 89 MG/DL
NRBC BLD-RTO: 0 /100 WBC
PLATELET # BLD AUTO: 310 K/UL (ref 150–450)
PMV BLD AUTO: 10.1 FL (ref 9.2–12.9)
POTASSIUM SERPL-SCNC: 4.2 MMOL/L (ref 3.5–5.1)
PROT SERPL-MCNC: 7.4 G/DL (ref 6–8.4)
RBC # BLD AUTO: 5.36 M/UL (ref 4.6–6.2)
SODIUM SERPL-SCNC: 139 MMOL/L (ref 136–145)
TRIGL SERPL-MCNC: 117 MG/DL (ref 30–150)
TSH SERPL DL<=0.005 MIU/L-ACNC: 0.91 UIU/ML (ref 0.4–4)
WBC # BLD AUTO: 8.56 K/UL (ref 3.9–12.7)

## 2023-02-25 PROCEDURE — 84443 ASSAY THYROID STIM HORMONE: CPT | Performed by: INTERNAL MEDICINE

## 2023-02-25 PROCEDURE — 80053 COMPREHEN METABOLIC PANEL: CPT | Performed by: INTERNAL MEDICINE

## 2023-02-25 PROCEDURE — 36415 COLL VENOUS BLD VENIPUNCTURE: CPT | Mod: PO | Performed by: INTERNAL MEDICINE

## 2023-02-25 PROCEDURE — 83036 HEMOGLOBIN GLYCOSYLATED A1C: CPT | Performed by: INTERNAL MEDICINE

## 2023-02-25 PROCEDURE — 85025 COMPLETE CBC W/AUTO DIFF WBC: CPT | Performed by: INTERNAL MEDICINE

## 2023-02-25 PROCEDURE — 80061 LIPID PANEL: CPT | Performed by: INTERNAL MEDICINE

## 2023-03-03 DIAGNOSIS — E11.9 TYPE 2 DIABETES MELLITUS WITHOUT COMPLICATION, WITHOUT LONG-TERM CURRENT USE OF INSULIN: ICD-10-CM

## 2023-03-03 RX ORDER — ATORVASTATIN CALCIUM 10 MG/1
10 TABLET, FILM COATED ORAL DAILY
Qty: 90 TABLET | Refills: 3 | Status: CANCELLED | OUTPATIENT
Start: 2023-03-03

## 2023-03-03 RX ORDER — ATORVASTATIN CALCIUM 10 MG/1
10 TABLET, FILM COATED ORAL DAILY
Qty: 90 TABLET | Refills: 3 | Status: SHIPPED | OUTPATIENT
Start: 2023-03-03 | End: 2024-03-05 | Stop reason: SDUPTHER

## 2023-03-03 NOTE — TELEPHONE ENCOUNTER
No new care gaps identified.  [facility] Embedded Care Gaps. Reference number: [id number]. 3/03/2023   4:02:08 PM CST

## 2023-03-03 NOTE — TELEPHONE ENCOUNTER
No new care gaps identified.  [facility] Embedded Care Gaps. Reference number: [id number]. 3/03/2023   4:02:32 PM CST

## 2023-03-15 LAB — HEMOCCULT STL QL IA: NEGATIVE

## 2023-04-11 ENCOUNTER — PATIENT MESSAGE (OUTPATIENT)
Dept: ADMINISTRATIVE | Facility: HOSPITAL | Age: 48
End: 2023-04-11
Payer: MEDICAID

## 2023-04-29 DIAGNOSIS — M51.9 LUMBAR DISC DISEASE: ICD-10-CM

## 2023-04-30 RX ORDER — CELECOXIB 200 MG/1
200 CAPSULE ORAL DAILY
Qty: 90 CAPSULE | Refills: 0 | Status: SHIPPED | OUTPATIENT
Start: 2023-04-30 | End: 2023-08-03 | Stop reason: SDUPTHER

## 2023-05-17 ENCOUNTER — PATIENT MESSAGE (OUTPATIENT)
Dept: FAMILY MEDICINE | Facility: CLINIC | Age: 48
End: 2023-05-17
Payer: MEDICAID

## 2023-05-18 ENCOUNTER — PATIENT MESSAGE (OUTPATIENT)
Dept: FAMILY MEDICINE | Facility: CLINIC | Age: 48
End: 2023-05-18
Payer: MEDICAID

## 2023-05-19 ENCOUNTER — TELEPHONE (OUTPATIENT)
Dept: FAMILY MEDICINE | Facility: CLINIC | Age: 48
End: 2023-05-19
Payer: MEDICAID

## 2023-05-19 NOTE — TELEPHONE ENCOUNTER
----- Message from Trisha Schwartz sent at 5/19/2023 11:01 AM CDT -----  Contact: Pt [phone]  No blue slot available to schedule an appointment for the patient.  Patient is established with which PCP: Jim   Reason for the visit: Medication Change   Would the patient like a call back, or a response through their [facility] portal?:  Portal     Thank you

## 2023-05-19 NOTE — TELEPHONE ENCOUNTER
----- Message from Karson Zhou sent at 5/19/2023  2:07 PM CDT -----  Contact: self  Type:  Sooner Appointment Request    Caller is requesting a sooner appointment.  Caller declined first available appointment listed below.  Caller will not accept being placed on the waitlist and is requesting a message be sent to doctor.    Name of Caller:  pt  When is the first available appointment?  5/26  Symptoms:    Best Call Back Number:  [phone]    Additional Information:  Pt would like to reschedule 5/26 appt to after 2pm or a later date  Thank you

## 2023-05-24 DIAGNOSIS — E11.9 TYPE 2 DIABETES MELLITUS WITHOUT COMPLICATION, UNSPECIFIED WHETHER LONG TERM INSULIN USE: ICD-10-CM

## 2023-05-26 ENCOUNTER — OFFICE VISIT (OUTPATIENT)
Dept: FAMILY MEDICINE | Facility: CLINIC | Age: 48
End: 2023-05-26
Payer: MEDICAID

## 2023-05-26 VITALS
SYSTOLIC BLOOD PRESSURE: 126 MMHG | WEIGHT: 257.25 LBS | OXYGEN SATURATION: 95 % | HEART RATE: 86 BPM | BODY MASS INDEX: 33.02 KG/M2 | HEIGHT: 74 IN | DIASTOLIC BLOOD PRESSURE: 88 MMHG | TEMPERATURE: 98 F

## 2023-05-26 DIAGNOSIS — E11.9 TYPE 2 DIABETES MELLITUS WITHOUT COMPLICATION, WITHOUT LONG-TERM CURRENT USE OF INSULIN: Primary | ICD-10-CM

## 2023-05-26 PROCEDURE — 3079F DIAST BP 80-89 MM HG: CPT | Mod: CPTII,,, | Performed by: INTERNAL MEDICINE

## 2023-05-26 PROCEDURE — 99214 OFFICE O/P EST MOD 30 MIN: CPT | Mod: S$PBB,,, | Performed by: INTERNAL MEDICINE

## 2023-05-26 PROCEDURE — 1160F RVW MEDS BY RX/DR IN RCRD: CPT | Mod: CPTII,,, | Performed by: INTERNAL MEDICINE

## 2023-05-26 PROCEDURE — 3051F HG A1C>EQUAL 7.0%<8.0%: CPT | Mod: CPTII,,, | Performed by: INTERNAL MEDICINE

## 2023-05-26 PROCEDURE — 3051F PR MOST RECENT HEMOGLOBIN A1C LEVEL 7.0 - < 8.0%: ICD-10-PCS | Mod: CPTII,,, | Performed by: INTERNAL MEDICINE

## 2023-05-26 PROCEDURE — 1160F PR REVIEW ALL MEDS BY PRESCRIBER/CLIN PHARMACIST DOCUMENTED: ICD-10-PCS | Mod: CPTII,,, | Performed by: INTERNAL MEDICINE

## 2023-05-26 PROCEDURE — 99999 PR PBB SHADOW E&M-EST. PATIENT-LVL III: ICD-10-PCS | Mod: PBBFAC,,, | Performed by: INTERNAL MEDICINE

## 2023-05-26 PROCEDURE — 4010F ACE/ARB THERAPY RXD/TAKEN: CPT | Mod: CPTII,,, | Performed by: INTERNAL MEDICINE

## 2023-05-26 PROCEDURE — 99213 OFFICE O/P EST LOW 20 MIN: CPT | Mod: PBBFAC,PO | Performed by: INTERNAL MEDICINE

## 2023-05-26 PROCEDURE — 1159F PR MEDICATION LIST DOCUMENTED IN MEDICAL RECORD: ICD-10-PCS | Mod: CPTII,,, | Performed by: INTERNAL MEDICINE

## 2023-05-26 PROCEDURE — 99999 PR PBB SHADOW E&M-EST. PATIENT-LVL III: CPT | Mod: PBBFAC,,, | Performed by: INTERNAL MEDICINE

## 2023-05-26 PROCEDURE — 1159F MED LIST DOCD IN RCRD: CPT | Mod: CPTII,,, | Performed by: INTERNAL MEDICINE

## 2023-05-26 PROCEDURE — 3008F PR BODY MASS INDEX (BMI) DOCUMENTED: ICD-10-PCS | Mod: CPTII,,, | Performed by: INTERNAL MEDICINE

## 2023-05-26 PROCEDURE — 99214 PR OFFICE/OUTPT VISIT, EST, LEVL IV, 30-39 MIN: ICD-10-PCS | Mod: S$PBB,,, | Performed by: INTERNAL MEDICINE

## 2023-05-26 PROCEDURE — 3074F PR MOST RECENT SYSTOLIC BLOOD PRESSURE < 130 MM HG: ICD-10-PCS | Mod: CPTII,,, | Performed by: INTERNAL MEDICINE

## 2023-05-26 PROCEDURE — 3079F PR MOST RECENT DIASTOLIC BLOOD PRESSURE 80-89 MM HG: ICD-10-PCS | Mod: CPTII,,, | Performed by: INTERNAL MEDICINE

## 2023-05-26 PROCEDURE — 4010F PR ACE/ARB THEARPY RXD/TAKEN: ICD-10-PCS | Mod: CPTII,,, | Performed by: INTERNAL MEDICINE

## 2023-05-26 PROCEDURE — 3008F BODY MASS INDEX DOCD: CPT | Mod: CPTII,,, | Performed by: INTERNAL MEDICINE

## 2023-05-26 PROCEDURE — 3074F SYST BP LT 130 MM HG: CPT | Mod: CPTII,,, | Performed by: INTERNAL MEDICINE

## 2023-05-26 RX ORDER — SEMAGLUTIDE 0.68 MG/ML
0.25 INJECTION, SOLUTION SUBCUTANEOUS
Qty: 3 ML | Refills: 0 | Status: SHIPPED | OUTPATIENT
Start: 2023-05-26 | End: 2023-06-26 | Stop reason: SDUPTHER

## 2023-05-26 NOTE — PROGRESS NOTES
Patient ID: Jose Boggs is a 47 y.o. male.    Chief Complaint: medication change        HPI - Assessment - Plan      Here to discuss switching Jardiance to Ozempic.  He has to urinate quite a bit with the Jardiance and his wife is taking Ozempic and doing well with this.  He would like to try this out.  His last A1c was 7.1.  I would like to see his A1c less than 6.5.  His BMI is 33 so a drop in weight would also be beneficial and can be seen with Ozempic over Jardiance. Patient denies family history of MEN and medullary thyroid cancer.  Denies personal history of pancreatitis and gastroparesis.     1. Type 2 diabetes mellitus without complication, without long-term current use of insulin       Stop jardiance and start ozempic.   A1c in July   Follow up 2 mo      Review of Systems   Constitutional:  Negative for fever.   Respiratory:  Negative for shortness of breath.    Cardiovascular:  Negative for chest pain.   Gastrointestinal:  Negative for abdominal pain.      Objective     Vitals:    05/26/23 1532   BP: 126/88   Pulse: 86   Temp: 98.2 °F (36.8 °C)     Wt Readings from Last 3 Encounters:   05/26/23 1532 116.7 kg (257 lb 4.4 oz)   12/20/22 1608 117.8 kg (259 lb 11.2 oz)   06/20/22 1440 117.2 kg (258 lb 6.1 oz)      Body mass index is 33.03 kg/m².   Physical Exam  Cardiovascular:      Rate and Rhythm: Normal rate and regular rhythm.      Heart sounds: No murmur heard.    No gallop.   Pulmonary:      Breath sounds: Normal breath sounds. No wheezing or rhonchi.   Abdominal:      Palpations: Abdomen is soft.      Tenderness: There is no abdominal tenderness.        Orders     Jose was seen today for medication change.    Diagnoses and all orders for this visit:    Type 2 diabetes mellitus without complication, without long-term current use of insulin  -     semaglutide (OZEMPIC) 0.25 mg or 0.5 mg (2 mg/3 mL) pen injector; Inject 0.25 mg into the skin every 7 days. Please take 0.25 mg every 7 days for 4  weeks, then take 0.5 mg weekly thereafter.  Can schedule an appointment to discuss increasing dose beyond that.         Diabetes Medications               empagliflozin (JARDIANCE) 10 mg tablet Take 1 tablet (10 mg total) by mouth once daily.           Hypertension Medications               olmesartan (BENICAR) 20 MG tablet Take 1 tablet (20 mg total) by mouth once daily.           Hyperlipidemia Medications               atorvastatin (LIPITOR) 10 MG tablet Take 1 tablet (10 mg total) by mouth once daily.           Medication List with Changes/Refills   New Medications    SEMAGLUTIDE (OZEMPIC) 0.25 MG OR 0.5 MG (2 MG/3 ML) PEN INJECTOR    Inject 0.25 mg into the skin every 7 days. Please take 0.25 mg every 7 days for 4 weeks, then take 0.5 mg weekly thereafter.  Can schedule an appointment to discuss increasing dose beyond that.   Current Medications    ACYCLOVIR 5% (ZOVIRAX) 5 % CREA    Apply topically 5 (five) times daily.    ATORVASTATIN (LIPITOR) 10 MG TABLET    Take 1 tablet (10 mg total) by mouth once daily.    BLOOD SUGAR DIAGNOSTIC STRP    Use to check blood sugar 3 times daily    BLOOD-GLUCOSE METER MISC    Dispense one glucometer, brand of choice covered by insurance  dispense test strips, and lancets to match. Check glucose Daily    BLOOD-GLUCOSE METER MISC    Use as directed to check blood glucose    CELECOXIB (CELEBREX) 200 MG CAPSULE    Take 1 capsule (200 mg total) by mouth once daily.    CLOTRIMAZOLE (LOTRIMIN) 1 % CREAM        CLOTRIMAZOLE-BETAMETHASONE 1-0.05% (LOTRISONE) CREAM    APPLY TO AFFECTED AREA TWICE A DAY    EMPAGLIFLOZIN (JARDIANCE) 10 MG TABLET    Take 1 tablet (10 mg total) by mouth once daily.    OLMESARTAN (BENICAR) 20 MG TABLET    Take 1 tablet (20 mg total) by mouth once daily.    OMEPRAZOLE (PRILOSEC) 20 MG CAPSULE    Take 1 capsule (20 mg total) by mouth once daily.    ONETOUCH DELICA PLUS LANCET 33 GAUGE MIS    USE TO TEST BLOOD SUGAR DAILY    ONETOUCH ULTRA BLUE TEST STRIP  STRP    TEST DAILY    VALACYCLOVIR (VALTREX) 1000 MG TABLET    Take 1 tablet (1,000 mg total) by mouth 3 (three) times daily. for 7 days       I personally reviewed past medical, family and social history.    Patient Active Problem List   Diagnosis    Hypertension    GERD (gastroesophageal reflux disease)    Lumbar disc disease    Myalgia and myositis    Acute bilateral low back pain without sciatica    Decreased range of motion of intervertebral discs of lumbar spine    Type II diabetes mellitus    Class 1 obesity

## 2023-06-24 ENCOUNTER — LAB VISIT (OUTPATIENT)
Dept: LAB | Facility: HOSPITAL | Age: 48
End: 2023-06-24
Attending: INTERNAL MEDICINE
Payer: MEDICAID

## 2023-06-24 DIAGNOSIS — E11.9 TYPE 2 DIABETES MELLITUS WITHOUT COMPLICATION, WITHOUT LONG-TERM CURRENT USE OF INSULIN: ICD-10-CM

## 2023-06-24 LAB
ESTIMATED AVG GLUCOSE: 163 MG/DL (ref 68–131)
HBA1C MFR BLD: 7.3 % (ref 4–5.6)

## 2023-06-24 PROCEDURE — 36415 COLL VENOUS BLD VENIPUNCTURE: CPT | Mod: PO | Performed by: INTERNAL MEDICINE

## 2023-06-24 PROCEDURE — 83036 HEMOGLOBIN GLYCOSYLATED A1C: CPT | Performed by: INTERNAL MEDICINE

## 2023-06-26 ENCOUNTER — OFFICE VISIT (OUTPATIENT)
Dept: FAMILY MEDICINE | Facility: CLINIC | Age: 48
End: 2023-06-26
Payer: MEDICAID

## 2023-06-26 VITALS
SYSTOLIC BLOOD PRESSURE: 122 MMHG | DIASTOLIC BLOOD PRESSURE: 84 MMHG | HEART RATE: 83 BPM | OXYGEN SATURATION: 95 % | WEIGHT: 261.44 LBS | TEMPERATURE: 98 F | BODY MASS INDEX: 33.55 KG/M2 | HEIGHT: 74 IN

## 2023-06-26 DIAGNOSIS — E11.9 TYPE 2 DIABETES MELLITUS WITHOUT COMPLICATION, WITHOUT LONG-TERM CURRENT USE OF INSULIN: ICD-10-CM

## 2023-06-26 PROCEDURE — 4010F ACE/ARB THERAPY RXD/TAKEN: CPT | Mod: CPTII,,, | Performed by: INTERNAL MEDICINE

## 2023-06-26 PROCEDURE — 3079F PR MOST RECENT DIASTOLIC BLOOD PRESSURE 80-89 MM HG: ICD-10-PCS | Mod: CPTII,,, | Performed by: INTERNAL MEDICINE

## 2023-06-26 PROCEDURE — 3074F PR MOST RECENT SYSTOLIC BLOOD PRESSURE < 130 MM HG: ICD-10-PCS | Mod: CPTII,,, | Performed by: INTERNAL MEDICINE

## 2023-06-26 PROCEDURE — 3008F PR BODY MASS INDEX (BMI) DOCUMENTED: ICD-10-PCS | Mod: CPTII,,, | Performed by: INTERNAL MEDICINE

## 2023-06-26 PROCEDURE — 3079F DIAST BP 80-89 MM HG: CPT | Mod: CPTII,,, | Performed by: INTERNAL MEDICINE

## 2023-06-26 PROCEDURE — 1159F PR MEDICATION LIST DOCUMENTED IN MEDICAL RECORD: ICD-10-PCS | Mod: CPTII,,, | Performed by: INTERNAL MEDICINE

## 2023-06-26 PROCEDURE — 3051F HG A1C>EQUAL 7.0%<8.0%: CPT | Mod: CPTII,,, | Performed by: INTERNAL MEDICINE

## 2023-06-26 PROCEDURE — 1159F MED LIST DOCD IN RCRD: CPT | Mod: CPTII,,, | Performed by: INTERNAL MEDICINE

## 2023-06-26 PROCEDURE — 4010F PR ACE/ARB THEARPY RXD/TAKEN: ICD-10-PCS | Mod: CPTII,,, | Performed by: INTERNAL MEDICINE

## 2023-06-26 PROCEDURE — 99999 PR PBB SHADOW E&M-EST. PATIENT-LVL III: ICD-10-PCS | Mod: PBBFAC,,, | Performed by: INTERNAL MEDICINE

## 2023-06-26 PROCEDURE — 1160F PR REVIEW ALL MEDS BY PRESCRIBER/CLIN PHARMACIST DOCUMENTED: ICD-10-PCS | Mod: CPTII,,, | Performed by: INTERNAL MEDICINE

## 2023-06-26 PROCEDURE — 99213 OFFICE O/P EST LOW 20 MIN: CPT | Mod: PBBFAC,PO | Performed by: INTERNAL MEDICINE

## 2023-06-26 PROCEDURE — 1160F RVW MEDS BY RX/DR IN RCRD: CPT | Mod: CPTII,,, | Performed by: INTERNAL MEDICINE

## 2023-06-26 PROCEDURE — 3008F BODY MASS INDEX DOCD: CPT | Mod: CPTII,,, | Performed by: INTERNAL MEDICINE

## 2023-06-26 PROCEDURE — 99214 PR OFFICE/OUTPT VISIT, EST, LEVL IV, 30-39 MIN: ICD-10-PCS | Mod: S$PBB,,, | Performed by: INTERNAL MEDICINE

## 2023-06-26 PROCEDURE — 3051F PR MOST RECENT HEMOGLOBIN A1C LEVEL 7.0 - < 8.0%: ICD-10-PCS | Mod: CPTII,,, | Performed by: INTERNAL MEDICINE

## 2023-06-26 PROCEDURE — 99214 OFFICE O/P EST MOD 30 MIN: CPT | Mod: S$PBB,,, | Performed by: INTERNAL MEDICINE

## 2023-06-26 PROCEDURE — 99999 PR PBB SHADOW E&M-EST. PATIENT-LVL III: CPT | Mod: PBBFAC,,, | Performed by: INTERNAL MEDICINE

## 2023-06-26 PROCEDURE — 3074F SYST BP LT 130 MM HG: CPT | Mod: CPTII,,, | Performed by: INTERNAL MEDICINE

## 2023-06-26 RX ORDER — SEMAGLUTIDE 0.68 MG/ML
0.5 INJECTION, SOLUTION SUBCUTANEOUS
Qty: 3 ML | Refills: 0 | Status: SHIPPED | OUTPATIENT
Start: 2023-06-26 | End: 2023-08-16 | Stop reason: SDUPTHER

## 2023-06-26 NOTE — PROGRESS NOTES
Patient ID: Jsoe Boggs is a 47 y.o. male.    Chief Complaint: Follow-up        Assessment and Plan      1. Type 2 diabetes mellitus without complication, without long-term current use of insulin       Technically, diabetes is controlled but I would like to get his A1c less than 6.5.  Increase Ozempic to 0.5 mg.  His weight should improve as well with increased dose. follow-up with A1c and microalbumin in 3 months     HPI     Here for follow-up after switching Jardiance to Ozempic.  Recall he was having increased urination on Jardiance.  He is tolerating the 0.25 mg well.  His weight actually increased a little bit.  Last A1c 7.3     Review of Systems   Constitutional:  Negative for fever.   Respiratory:  Negative for shortness of breath.    Cardiovascular:  Negative for chest pain.   Gastrointestinal:  Negative for abdominal pain.      Objective     Vitals:    06/26/23 1450   BP: 122/84   Pulse: 83   Temp: 97.8 °F (36.6 °C)     Wt Readings from Last 3 Encounters:   06/26/23 1450 118.6 kg (261 lb 7.5 oz)   05/26/23 1532 116.7 kg (257 lb 4.4 oz)   12/20/22 1608 117.8 kg (259 lb 11.2 oz)      Body mass index is 33.57 kg/m².   Physical Exam  Cardiovascular:      Rate and Rhythm: Normal rate and regular rhythm.      Heart sounds: No murmur heard.    No gallop.   Pulmonary:      Breath sounds: Normal breath sounds. No wheezing or rhonchi.   Abdominal:      Palpations: Abdomen is soft.      Tenderness: There is no abdominal tenderness.        Diane Garcia was seen today for follow-up.    Diagnoses and all orders for this visit:    Type 2 diabetes mellitus without complication, without long-term current use of insulin  -     semaglutide (OZEMPIC) 0.25 mg or 0.5 mg (2 mg/3 mL) pen injector; Inject 0.5 mg into the skin every 7 days.  -     Microalbumin/Creatinine Ratio, Urine; Future  -     Hemoglobin A1C; Future         Diabetes Medications               semaglutide (OZEMPIC) 0.25 mg or 0.5 mg (2 mg/3 mL) pen  injector Inject 0.25 mg into the skin every 7 days. Please take 0.25 mg every 7 days for 4 weeks, then take 0.5 mg weekly thereafter.  Can schedule an appointment to discuss increasing dose beyond that.    empagliflozin (JARDIANCE) 10 mg tablet Take 1 tablet (10 mg total) by mouth once daily.           Hypertension Medications               olmesartan (BENICAR) 20 MG tablet Take 1 tablet (20 mg total) by mouth once daily.           Hyperlipidemia Medications               atorvastatin (LIPITOR) 10 MG tablet Take 1 tablet (10 mg total) by mouth once daily.           Medication List with Changes/Refills   Current Medications    ACYCLOVIR 5% (ZOVIRAX) 5 % CREA    Apply topically 5 (five) times daily.    ATORVASTATIN (LIPITOR) 10 MG TABLET    Take 1 tablet (10 mg total) by mouth once daily.    BLOOD SUGAR DIAGNOSTIC STRP    Use to check blood sugar 3 times daily    BLOOD-GLUCOSE METER MISC    Dispense one glucometer, brand of choice covered by insurance  dispense test strips, and lancets to match. Check glucose Daily    BLOOD-GLUCOSE METER MISC    Use as directed to check blood glucose    CELECOXIB (CELEBREX) 200 MG CAPSULE    Take 1 capsule (200 mg total) by mouth once daily.    CLOTRIMAZOLE (LOTRIMIN) 1 % CREAM        CLOTRIMAZOLE-BETAMETHASONE 1-0.05% (LOTRISONE) CREAM    APPLY TO AFFECTED AREA TWICE A DAY    EMPAGLIFLOZIN (JARDIANCE) 10 MG TABLET    Take 1 tablet (10 mg total) by mouth once daily.    OLMESARTAN (BENICAR) 20 MG TABLET    Take 1 tablet (20 mg total) by mouth once daily.    OMEPRAZOLE (PRILOSEC) 20 MG CAPSULE    Take 1 capsule (20 mg total) by mouth once daily.    ONETOUCH DELICA PLUS LANCET 33 GAUGE MISC    USE TO TEST BLOOD SUGAR DAILY    ONETOUCH ULTRA BLUE TEST STRIP STRP    TEST DAILY    VALACYCLOVIR (VALTREX) 1000 MG TABLET    Take 1 tablet (1,000 mg total) by mouth 3 (three) times daily. for 7 days   Changed and/or Refilled Medications    Modified Medication Previous Medication    SEMAGLUTIDE  (OZEMPIC) 0.25 MG OR 0.5 MG (2 MG/3 ML) PEN INJECTOR semaglutide (OZEMPIC) 0.25 mg or 0.5 mg (2 mg/3 mL) pen injector       Inject 0.5 mg into the skin every 7 days.    Inject 0.25 mg into the skin every 7 days. Please take 0.25 mg every 7 days for 4 weeks, then take 0.5 mg weekly thereafter.  Can schedule an appointment to discuss increasing dose beyond that.       I personally reviewed past medical, family and social history.    Patient Active Problem List   Diagnosis    Hypertension    GERD (gastroesophageal reflux disease)    Lumbar disc disease    Myalgia and myositis    Acute bilateral low back pain without sciatica    Decreased range of motion of intervertebral discs of lumbar spine    Type II diabetes mellitus    Class 1 obesity

## 2023-06-29 LAB
LEFT EYE DM RETINOPATHY: NEGATIVE
RIGHT EYE DM RETINOPATHY: NEGATIVE

## 2023-06-30 ENCOUNTER — PATIENT MESSAGE (OUTPATIENT)
Dept: FAMILY MEDICINE | Facility: CLINIC | Age: 48
End: 2023-06-30
Payer: MEDICAID

## 2023-07-03 ENCOUNTER — PATIENT OUTREACH (OUTPATIENT)
Dept: ADMINISTRATIVE | Facility: HOSPITAL | Age: 48
End: 2023-07-03
Payer: MEDICAID

## 2023-07-03 NOTE — PROGRESS NOTES
Non-compliant report chart audits DIABETIC EYE EXAM.   Chart review completed for  test overdue (mammograms, Colonoscopies, pap smears, DM labs, and/or EYE EXAMs)      Care Everywhere and media, updates requested and reviewed.      HM updated with external eye exam report      Outreach:  Diabetic eye exam

## 2023-07-25 DIAGNOSIS — E11.9 TYPE 2 DIABETES MELLITUS WITHOUT COMPLICATION, WITHOUT LONG-TERM CURRENT USE OF INSULIN: ICD-10-CM

## 2023-07-25 RX ORDER — LANCETS 33 GAUGE
EACH MISCELLANEOUS
Qty: 300 EACH | Refills: 3 | Status: SHIPPED | OUTPATIENT
Start: 2023-07-25

## 2023-07-25 RX ORDER — LANCETS 33 GAUGE
EACH MISCELLANEOUS
Qty: 300 EACH | Refills: 3 | Status: CANCELLED | OUTPATIENT
Start: 2023-07-25

## 2023-07-25 NOTE — TELEPHONE ENCOUNTER
Refill Routing Note   Medication(s) are not appropriate for processing by [facility] for the following reason(s):      No active prescription written by provider    [facility] action(s):  Defer Care Due:  None identified            Appointments  past 12m or future 3m with PCP    Date Provider   Last Visit   6/26/2023 Xavier Fernandez, DO   Next Visit   10/4/2023 Xavier Fernandez, DO   ED visits in past 90 days: 0        Note composed:8:47 AM 07/25/2023

## 2023-07-25 NOTE — TELEPHONE ENCOUNTER
No care due was identified.  [facility] Embedded Care Due Messages. Reference number: [id number].   7/25/2023 11:40:54 AM CDT

## 2023-07-25 NOTE — TELEPHONE ENCOUNTER
No care due was identified.  [facility] Embedded Care Due Messages. Reference number: [id number].   7/25/2023 7:39:53 AM CDT

## 2023-08-01 DIAGNOSIS — I10 PRIMARY HYPERTENSION: ICD-10-CM

## 2023-08-01 RX ORDER — OLMESARTAN MEDOXOMIL 20 MG/1
20 TABLET ORAL
Qty: 90 TABLET | Refills: 1 | Status: SHIPPED | OUTPATIENT
Start: 2023-08-01 | End: 2024-01-29

## 2023-08-01 NOTE — TELEPHONE ENCOUNTER
No care due was identified.  [facility] Embedded Care Due Messages. Reference number: [id number].   8/01/2023 5:47:01 AM CDT

## 2023-08-01 NOTE — TELEPHONE ENCOUNTER
Refill Decision Note   Jose Boggs  is requesting a refill authorization.  Brief Assessment and Rationale for Refill:  Approve     Medication Therapy Plan:         Comments:     Note composed:9:35 AM 08/01/2023

## 2023-08-03 DIAGNOSIS — M51.9 LUMBAR DISC DISEASE: ICD-10-CM

## 2023-08-03 RX ORDER — DEXTROSE 4 G
TABLET,CHEWABLE ORAL
Qty: 1 EACH | Refills: 0 | Status: CANCELLED | OUTPATIENT
Start: 2023-08-03

## 2023-08-03 RX ORDER — CELECOXIB 200 MG/1
200 CAPSULE ORAL DAILY
Qty: 90 CAPSULE | Refills: 0 | Status: CANCELLED | OUTPATIENT
Start: 2023-08-03

## 2023-08-04 DIAGNOSIS — M51.9 LUMBAR DISC DISEASE: ICD-10-CM

## 2023-08-04 RX ORDER — DEXTROSE 4 G
TABLET,CHEWABLE ORAL
Qty: 1 EACH | Refills: 0 | Status: SHIPPED | OUTPATIENT
Start: 2023-08-04

## 2023-08-04 RX ORDER — CELECOXIB 200 MG/1
200 CAPSULE ORAL DAILY
Qty: 90 CAPSULE | Refills: 0 | Status: SHIPPED | OUTPATIENT
Start: 2023-08-04 | End: 2023-11-02 | Stop reason: SDUPTHER

## 2023-08-04 NOTE — TELEPHONE ENCOUNTER
No care due was identified.  [facility] Embedded Care Due Messages. Reference number: [id number].   8/03/2023 10:30:22 PM CDT

## 2023-08-04 NOTE — TELEPHONE ENCOUNTER
No care due was identified.  [facility] Embedded Care Due Messages. Reference number: [id number].   8/04/2023 1:17:11 PM CDT

## 2023-08-07 ENCOUNTER — PATIENT MESSAGE (OUTPATIENT)
Dept: FAMILY MEDICINE | Facility: CLINIC | Age: 48
End: 2023-08-07
Payer: MEDICAID

## 2023-08-07 DIAGNOSIS — M51.9 LUMBAR DISC DISEASE: ICD-10-CM

## 2023-08-07 RX ORDER — CELECOXIB 200 MG/1
200 CAPSULE ORAL DAILY
Qty: 90 CAPSULE | Refills: 0 | Status: SHIPPED | OUTPATIENT
Start: 2023-08-07

## 2023-08-16 DIAGNOSIS — E11.9 TYPE 2 DIABETES MELLITUS WITHOUT COMPLICATION, WITHOUT LONG-TERM CURRENT USE OF INSULIN: ICD-10-CM

## 2023-08-16 RX ORDER — SEMAGLUTIDE 0.68 MG/ML
0.5 INJECTION, SOLUTION SUBCUTANEOUS
Qty: 9 ML | Refills: 1 | Status: CANCELLED | OUTPATIENT
Start: 2023-08-16

## 2023-08-16 RX ORDER — SEMAGLUTIDE 0.68 MG/ML
0.5 INJECTION, SOLUTION SUBCUTANEOUS
Qty: 9 ML | Refills: 1 | Status: SHIPPED | OUTPATIENT
Start: 2023-08-16 | End: 2023-09-06

## 2023-08-16 NOTE — TELEPHONE ENCOUNTER
No care due was identified.  [facility] Embedded Care Due Messages. Reference number: [id number].   8/16/2023 7:02:41 AM CDT

## 2023-08-16 NOTE — TELEPHONE ENCOUNTER
Refill Routing Note   Medication(s) are not appropriate for processing by [facility] for the following reason(s):      New or recently adjusted medication    [facility] action(s):  Defer Care Due:  None identified            Appointments  past 12m or future 3m with PCP    Date Provider   Last Visit   6/26/2023 Xavier Fernandez, DO   Next Visit   10/4/2023 Xavier Fernandez, DO   ED visits in past 90 days: 0        Note composed:8:55 AM 08/16/2023

## 2023-08-16 NOTE — TELEPHONE ENCOUNTER
No care due was identified.  [facility] Embedded Care Due Messages. Reference number: [id number].   8/16/2023 2:13:10 PM CDT

## 2023-08-30 ENCOUNTER — PATIENT MESSAGE (OUTPATIENT)
Dept: FAMILY MEDICINE | Facility: CLINIC | Age: 48
End: 2023-08-30
Payer: MEDICAID

## 2023-08-30 DIAGNOSIS — E11.9 TYPE 2 DIABETES MELLITUS WITHOUT COMPLICATION, WITHOUT LONG-TERM CURRENT USE OF INSULIN: Primary | ICD-10-CM

## 2023-09-04 DIAGNOSIS — B35.6 JOCK ITCH: ICD-10-CM

## 2023-09-04 RX ORDER — CLOTRIMAZOLE AND BETAMETHASONE DIPROPIONATE 10; .64 MG/G; MG/G
CREAM TOPICAL
Qty: 45 G | Refills: 1 | Status: CANCELLED | OUTPATIENT
Start: 2023-09-04

## 2023-09-05 DIAGNOSIS — B35.6 JOCK ITCH: ICD-10-CM

## 2023-09-05 RX ORDER — CLOTRIMAZOLE AND BETAMETHASONE DIPROPIONATE 10; .64 MG/G; MG/G
CREAM TOPICAL
Qty: 45 G | Refills: 1 | Status: SHIPPED | OUTPATIENT
Start: 2023-09-05

## 2023-09-05 NOTE — TELEPHONE ENCOUNTER
Refill Routing Note   Medication(s) are not appropriate for processing by [facility] for the following reason(s):      Medication outside of protocol    [facility] action(s):  Route Care Due:  None identified            Appointments  past 12m or future 3m with PCP    Date Provider   Last Visit   6/26/2023 Xavier Fernandez, DO   Next Visit   10/4/2023 Xavier Fernandez, DO   ED visits in past 90 days: 0        Note composed:10:00 PM 09/04/2023

## 2023-09-06 ENCOUNTER — OFFICE VISIT (OUTPATIENT)
Dept: FAMILY MEDICINE | Facility: CLINIC | Age: 48
End: 2023-09-06
Payer: MEDICAID

## 2023-09-06 ENCOUNTER — PATIENT MESSAGE (OUTPATIENT)
Dept: FAMILY MEDICINE | Facility: CLINIC | Age: 48
End: 2023-09-06

## 2023-09-06 DIAGNOSIS — E11.9 TYPE 2 DIABETES MELLITUS WITHOUT COMPLICATION, WITHOUT LONG-TERM CURRENT USE OF INSULIN: Primary | ICD-10-CM

## 2023-09-06 PROCEDURE — 1160F RVW MEDS BY RX/DR IN RCRD: CPT | Mod: CPTII,95,, | Performed by: INTERNAL MEDICINE

## 2023-09-06 PROCEDURE — 4010F ACE/ARB THERAPY RXD/TAKEN: CPT | Mod: CPTII,95,, | Performed by: INTERNAL MEDICINE

## 2023-09-06 PROCEDURE — 4010F PR ACE/ARB THEARPY RXD/TAKEN: ICD-10-PCS | Mod: CPTII,95,, | Performed by: INTERNAL MEDICINE

## 2023-09-06 PROCEDURE — 1159F PR MEDICATION LIST DOCUMENTED IN MEDICAL RECORD: ICD-10-PCS | Mod: CPTII,95,, | Performed by: INTERNAL MEDICINE

## 2023-09-06 PROCEDURE — 1160F PR REVIEW ALL MEDS BY PRESCRIBER/CLIN PHARMACIST DOCUMENTED: ICD-10-PCS | Mod: CPTII,95,, | Performed by: INTERNAL MEDICINE

## 2023-09-06 PROCEDURE — 2023F DILAT RTA XM W/O RTNOPTHY: CPT | Mod: CPTII,95,, | Performed by: INTERNAL MEDICINE

## 2023-09-06 PROCEDURE — 3051F HG A1C>EQUAL 7.0%<8.0%: CPT | Mod: CPTII,95,, | Performed by: INTERNAL MEDICINE

## 2023-09-06 PROCEDURE — 99214 OFFICE O/P EST MOD 30 MIN: CPT | Mod: 95,,, | Performed by: INTERNAL MEDICINE

## 2023-09-06 PROCEDURE — 3051F PR MOST RECENT HEMOGLOBIN A1C LEVEL 7.0 - < 8.0%: ICD-10-PCS | Mod: CPTII,95,, | Performed by: INTERNAL MEDICINE

## 2023-09-06 PROCEDURE — 99214 PR OFFICE/OUTPT VISIT, EST, LEVL IV, 30-39 MIN: ICD-10-PCS | Mod: 95,,, | Performed by: INTERNAL MEDICINE

## 2023-09-06 PROCEDURE — 2023F PR DILATED RETINAL EXAM W/O EVID OF RETINOPATHY: ICD-10-PCS | Mod: CPTII,95,, | Performed by: INTERNAL MEDICINE

## 2023-09-06 PROCEDURE — 1159F MED LIST DOCD IN RCRD: CPT | Mod: CPTII,95,, | Performed by: INTERNAL MEDICINE

## 2023-09-06 RX ORDER — TIRZEPATIDE 2.5 MG/.5ML
2.5 INJECTION, SOLUTION SUBCUTANEOUS
Qty: 4 PEN | Refills: 0 | Status: SHIPPED | OUTPATIENT
Start: 2023-09-06 | End: 2023-09-06

## 2023-09-06 RX ORDER — TIRZEPATIDE 2.5 MG/.5ML
2.5 INJECTION, SOLUTION SUBCUTANEOUS
Qty: 4 PEN | Refills: 0 | Status: SHIPPED | OUTPATIENT
Start: 2023-09-06 | End: 2023-10-04

## 2023-09-06 NOTE — PROGRESS NOTES
Patient ID: Jose Boggs is a 47 y.o. male.    Chief Complaint: No chief complaint on file.    VIRTUAL VISIT    Assessment and Plan      1. Type 2 diabetes mellitus without complication, without long-term current use of insulin  - tirzepatide (MOUNJARO) 2.5 mg/0.5 mL PnIj; Inject 2.5 mg into the skin every 7 days.  Dispense: 4 pen ; Refill: 0      Switch Ozempic to Mounjaro.  He has follow-up in October with me.    HPI     He is on Ozempic 0.5 mg x 4 weeks and is injecting it properly.  His appetite has decreased and he is eating less. Despite this, his fasting blood sugars have been in the 170s and he has actually gained 5-8 lb.      Review of Systems   Constitutional:  Negative for fatigue.   Cardiovascular:  Negative for chest pain.   Endocrine: Negative for polydipsia, polyphagia and polyuria.   Skin:  Negative for pallor.   Neurological:  Negative for dizziness, tremors, seizures, speech difficulty, weakness and headaches.   Psychiatric/Behavioral:  Negative for confusion. The patient is not nervous/anxious.         Wt Readings from Last 3 Encounters:   06/26/23 1450 118.6 kg (261 lb 7.5 oz)   05/26/23 1532 116.7 kg (257 lb 4.4 oz)   12/20/22 1608 117.8 kg (259 lb 11.2 oz)     Medication List with Changes/Refills   New Medications    TIRZEPATIDE (MOUNJARO) 2.5 MG/0.5 ML PNIJ    Inject 2.5 mg into the skin every 7 days.   Current Medications    ACYCLOVIR 5% (ZOVIRAX) 5 % CREA    Apply topically 5 (five) times daily.    ATORVASTATIN (LIPITOR) 10 MG TABLET    Take 1 tablet (10 mg total) by mouth once daily.    BLOOD SUGAR DIAGNOSTIC STRP    Use to check blood sugar 3 times daily    BLOOD-GLUCOSE METER (ONETOUCH VERIO FLEX METER) MISC    Use as directed to check blood glucose    CELECOXIB (CELEBREX) 200 MG CAPSULE    Take 1 capsule (200 mg total) by mouth once daily.    CELECOXIB (CELEBREX) 200 MG CAPSULE    Take 1 capsule (200 mg total) by mouth once daily.    CLOTRIMAZOLE (LOTRIMIN) 1 % CREAM         CLOTRIMAZOLE-BETAMETHASONE 1-0.05% (LOTRISONE) CREAM    APPLY TO AFFECTED AREA TWICE A DAY    EMPAGLIFLOZIN (JARDIANCE) 10 MG TABLET    Take 1 tablet (10 mg total) by mouth once daily.    OLMESARTAN (BENICAR) 20 MG TABLET    TAKE 1 TABLET(20 MG) BY MOUTH EVERY DAY    OMEPRAZOLE (PRILOSEC) 20 MG CAPSULE    Take 1 capsule (20 mg total) by mouth once daily.    ONETOUCH DELICA PLUS LANCET 33 GAUGE MISC    USE TO TEST BLOOD SUGAR DAILY    ONETOUCH ULTRA BLUE TEST STRIP STRP    TEST DAILY    VALACYCLOVIR (VALTREX) 1000 MG TABLET    Take 1 tablet (1,000 mg total) by mouth 3 (three) times daily. for 7 days           The patient location is:  [place]  The chief complaint leading to consultation is: diabetes   Face to Face time with patient: 14 min   minutes of total time spent on the encounter, which includes face to face time and   non-face to face time preparing to see the patient (eg, review of tests), Obtaining and/or   reviewing separately obtained history, Documenting clinical information in the electronic   or other health record, Independently interpreting results (not separately reported) and   communicating results to the patient/family/caregiver, or   Care coordination (not separately reported).     Each patient to whom he or she provides medical services by telemedicine is:    (1) informed of the relationship between the physician and patient and the respective   role of any other health care provider with respect to management of the patient; and   (2) notified that he or she may decline to receive medical services by telemedicine and   may withdraw from such care at any time.    I personally reviewed past medical, family and social history.     Answers submitted by the patient for this visit:  Diabetes Questionnaire (Submitted on 9/6/2023)  Chief Complaint: Diabetes problem  Diabetes type: type 2  MedicAlert ID: No  Disease duration: 4 Years  blurred vision: No  foot paresthesias: No  foot ulcerations:  No  visual change: No  weight loss: No  Symptom course: worsening  hunger: No  mood changes: No  sleepiness: No  sweats: No  blackouts: No  hospitalization: No  nocturnal hypoglycemia: No  required assistance: No  required glucagon: No  CVA: No  heart disease: No  impotence: No  nephropathy: No  peripheral neuropathy: No  PVD: No  retinopathy: No  autonomic neuropathy: No  CAD risks: hypertension, obesity, diabetes mellitus, male sex  Current treatments: insulin injections  Treatment compliance: all of the time  Dose schedule: at bedtime  Given by: significant other  Injection sites: abdominal wall  Home blood tests: 3-4 x per week  Home urines: <1 x per month  Monitoring compliance: fair  Blood glucose trend: increasing steadily  Weight trend: fluctuating minimally  Current diet: generally healthy  Meal planning: avoidance of concentrated sweets  Exercise: three times a week  Dietitian visit: No  Eye exam current: Yes  Sees podiatrist: No

## 2023-09-30 ENCOUNTER — LAB VISIT (OUTPATIENT)
Dept: LAB | Facility: HOSPITAL | Age: 48
End: 2023-09-30
Attending: INTERNAL MEDICINE
Payer: MEDICAID

## 2023-09-30 DIAGNOSIS — E11.9 TYPE 2 DIABETES MELLITUS WITHOUT COMPLICATION, WITHOUT LONG-TERM CURRENT USE OF INSULIN: ICD-10-CM

## 2023-09-30 LAB
ALBUMIN/CREAT UR: 5.1 UG/MG (ref 0–30)
CREAT UR-MCNC: 430 MG/DL (ref 23–375)
ESTIMATED AVG GLUCOSE: 171 MG/DL (ref 68–131)
HBA1C MFR BLD: 7.6 % (ref 4–5.6)
MICROALBUMIN UR DL<=1MG/L-MCNC: 22 UG/ML

## 2023-09-30 PROCEDURE — 82043 UR ALBUMIN QUANTITATIVE: CPT | Performed by: INTERNAL MEDICINE

## 2023-09-30 PROCEDURE — 83036 HEMOGLOBIN GLYCOSYLATED A1C: CPT | Performed by: INTERNAL MEDICINE

## 2023-09-30 PROCEDURE — 36415 COLL VENOUS BLD VENIPUNCTURE: CPT | Mod: PO | Performed by: INTERNAL MEDICINE

## 2023-10-04 ENCOUNTER — OFFICE VISIT (OUTPATIENT)
Dept: FAMILY MEDICINE | Facility: CLINIC | Age: 48
End: 2023-10-04
Payer: MEDICAID

## 2023-10-04 VITALS
HEART RATE: 80 BPM | WEIGHT: 263.69 LBS | SYSTOLIC BLOOD PRESSURE: 128 MMHG | BODY MASS INDEX: 33.84 KG/M2 | HEIGHT: 74 IN | OXYGEN SATURATION: 97 % | DIASTOLIC BLOOD PRESSURE: 88 MMHG | TEMPERATURE: 98 F

## 2023-10-04 DIAGNOSIS — I10 PRIMARY HYPERTENSION: ICD-10-CM

## 2023-10-04 DIAGNOSIS — Z13.29 SCREENING FOR THYROID DISORDER: ICD-10-CM

## 2023-10-04 DIAGNOSIS — E11.9 TYPE 2 DIABETES MELLITUS WITHOUT COMPLICATION, WITHOUT LONG-TERM CURRENT USE OF INSULIN: Primary | ICD-10-CM

## 2023-10-04 PROCEDURE — 4010F PR ACE/ARB THEARPY RXD/TAKEN: ICD-10-PCS | Mod: CPTII,,, | Performed by: INTERNAL MEDICINE

## 2023-10-04 PROCEDURE — 1160F PR REVIEW ALL MEDS BY PRESCRIBER/CLIN PHARMACIST DOCUMENTED: ICD-10-PCS | Mod: CPTII,,, | Performed by: INTERNAL MEDICINE

## 2023-10-04 PROCEDURE — 3051F PR MOST RECENT HEMOGLOBIN A1C LEVEL 7.0 - < 8.0%: ICD-10-PCS | Mod: CPTII,,, | Performed by: INTERNAL MEDICINE

## 2023-10-04 PROCEDURE — 99214 OFFICE O/P EST MOD 30 MIN: CPT | Mod: PBBFAC,PO | Performed by: INTERNAL MEDICINE

## 2023-10-04 PROCEDURE — 1160F RVW MEDS BY RX/DR IN RCRD: CPT | Mod: CPTII,,, | Performed by: INTERNAL MEDICINE

## 2023-10-04 PROCEDURE — 99999 PR PBB SHADOW E&M-EST. PATIENT-LVL IV: CPT | Mod: PBBFAC,,, | Performed by: INTERNAL MEDICINE

## 2023-10-04 PROCEDURE — 3074F PR MOST RECENT SYSTOLIC BLOOD PRESSURE < 130 MM HG: ICD-10-PCS | Mod: CPTII,,, | Performed by: INTERNAL MEDICINE

## 2023-10-04 PROCEDURE — 3079F PR MOST RECENT DIASTOLIC BLOOD PRESSURE 80-89 MM HG: ICD-10-PCS | Mod: CPTII,,, | Performed by: INTERNAL MEDICINE

## 2023-10-04 PROCEDURE — 1159F MED LIST DOCD IN RCRD: CPT | Mod: CPTII,,, | Performed by: INTERNAL MEDICINE

## 2023-10-04 PROCEDURE — 3051F HG A1C>EQUAL 7.0%<8.0%: CPT | Mod: CPTII,,, | Performed by: INTERNAL MEDICINE

## 2023-10-04 PROCEDURE — 3079F DIAST BP 80-89 MM HG: CPT | Mod: CPTII,,, | Performed by: INTERNAL MEDICINE

## 2023-10-04 PROCEDURE — 3008F PR BODY MASS INDEX (BMI) DOCUMENTED: ICD-10-PCS | Mod: CPTII,,, | Performed by: INTERNAL MEDICINE

## 2023-10-04 PROCEDURE — 3066F PR DOCUMENTATION OF TREATMENT FOR NEPHROPATHY: ICD-10-PCS | Mod: CPTII,,, | Performed by: INTERNAL MEDICINE

## 2023-10-04 PROCEDURE — 99999 PR PBB SHADOW E&M-EST. PATIENT-LVL IV: ICD-10-PCS | Mod: PBBFAC,,, | Performed by: INTERNAL MEDICINE

## 2023-10-04 PROCEDURE — 4010F ACE/ARB THERAPY RXD/TAKEN: CPT | Mod: CPTII,,, | Performed by: INTERNAL MEDICINE

## 2023-10-04 PROCEDURE — 2023F DILAT RTA XM W/O RTNOPTHY: CPT | Mod: CPTII,,, | Performed by: INTERNAL MEDICINE

## 2023-10-04 PROCEDURE — 99213 PR OFFICE/OUTPT VISIT, EST, LEVL III, 20-29 MIN: ICD-10-PCS | Mod: S$PBB,,, | Performed by: INTERNAL MEDICINE

## 2023-10-04 PROCEDURE — 3008F BODY MASS INDEX DOCD: CPT | Mod: CPTII,,, | Performed by: INTERNAL MEDICINE

## 2023-10-04 PROCEDURE — 99213 OFFICE O/P EST LOW 20 MIN: CPT | Mod: S$PBB,,, | Performed by: INTERNAL MEDICINE

## 2023-10-04 PROCEDURE — 1159F PR MEDICATION LIST DOCUMENTED IN MEDICAL RECORD: ICD-10-PCS | Mod: CPTII,,, | Performed by: INTERNAL MEDICINE

## 2023-10-04 PROCEDURE — 3074F SYST BP LT 130 MM HG: CPT | Mod: CPTII,,, | Performed by: INTERNAL MEDICINE

## 2023-10-04 PROCEDURE — 3066F NEPHROPATHY DOC TX: CPT | Mod: CPTII,,, | Performed by: INTERNAL MEDICINE

## 2023-10-04 PROCEDURE — 3061F PR NEG MICROALBUMINURIA RESULT DOCUMENTED/REVIEW: ICD-10-PCS | Mod: CPTII,,, | Performed by: INTERNAL MEDICINE

## 2023-10-04 PROCEDURE — 3061F NEG MICROALBUMINURIA REV: CPT | Mod: CPTII,,, | Performed by: INTERNAL MEDICINE

## 2023-10-04 PROCEDURE — 2023F PR DILATED RETINAL EXAM W/O EVID OF RETINOPATHY: ICD-10-PCS | Mod: CPTII,,, | Performed by: INTERNAL MEDICINE

## 2023-10-04 RX ORDER — TIRZEPATIDE 5 MG/.5ML
5 INJECTION, SOLUTION SUBCUTANEOUS
Qty: 4 PEN | Refills: 0 | Status: SHIPPED | OUTPATIENT
Start: 2023-10-04 | End: 2023-10-24 | Stop reason: SDUPTHER

## 2023-10-04 NOTE — PROGRESS NOTES
Patient ID: Jose Boggs is a 47 y.o. male.    Chief Complaint: Diabetes        Assessment and Plan      1. Type 2 diabetes mellitus without complication, without long-term current use of insulin  - tirzepatide (MOUNJARO) 5 mg/0.5 mL PnIj; Inject 5 mg into the skin every 7 days.  Dispense: 4 pen ; Refill: 0  - Hemoglobin A1C; Future  - Lipid Panel; Future    2. Primary hypertension  - CBC Auto Differential; Future  - Comprehensive Metabolic Panel; Future    3. Screening for thyroid disorder  - TSH; Future       Increase mounjaro  to 5 mg   Labs and annual in January HPI     One-month ago we switched Ozempic to mounjaro.  Blood sugars have improved now running 120s to 140s.  Was 170s.  A1c actually came up a little bit which is strange.  A1c 7.6 was 7.3.  No nausea.  Does have little bit more pain with injection than he did with Ozempic.  It is curbing his appetite and he is eating less.  He would like to go up to the next dose.    Review of Systems   Constitutional:  Negative for fever.   Respiratory:  Negative for shortness of breath.    Cardiovascular:  Negative for chest pain.   Gastrointestinal:  Negative for abdominal pain.        Objective     Vitals:    10/04/23 1422   BP: 128/88   Pulse: 80   Temp: 97.8 °F (36.6 °C)     Wt Readings from Last 3 Encounters:   10/04/23 1422 119.6 kg (263 lb 10.7 oz)   06/26/23 1450 118.6 kg (261 lb 7.5 oz)   05/26/23 1532 116.7 kg (257 lb 4.4 oz)      Body mass index is 33.85 kg/m².   Physical Exam  Cardiovascular:      Rate and Rhythm: Normal rate and regular rhythm.      Heart sounds: No murmur heard.     No gallop.   Pulmonary:      Breath sounds: Normal breath sounds. No wheezing or rhonchi.   Abdominal:      Palpations: Abdomen is soft.      Tenderness: There is no abdominal tenderness.          Medication List with Changes/Refills   New Medications    TIRZEPATIDE (MOUNJARO) 5 MG/0.5 ML PNIJ    Inject 5 mg into the skin every 7 days.   Current Medications     ACYCLOVIR 5% (ZOVIRAX) 5 % CREA    Apply topically 5 (five) times daily.    ATORVASTATIN (LIPITOR) 10 MG TABLET    Take 1 tablet (10 mg total) by mouth once daily.    BLOOD SUGAR DIAGNOSTIC STRP    Use to check blood sugar 3 times daily    BLOOD-GLUCOSE METER (ONETOUCH VERIO FLEX METER) MISC    Use as directed to check blood glucose    CELECOXIB (CELEBREX) 200 MG CAPSULE    Take 1 capsule (200 mg total) by mouth once daily.    CELECOXIB (CELEBREX) 200 MG CAPSULE    Take 1 capsule (200 mg total) by mouth once daily.    CLOTRIMAZOLE (LOTRIMIN) 1 % CREAM        CLOTRIMAZOLE-BETAMETHASONE 1-0.05% (LOTRISONE) CREAM    APPLY TO AFFECTED AREA TWICE A DAY    OLMESARTAN (BENICAR) 20 MG TABLET    TAKE 1 TABLET(20 MG) BY MOUTH EVERY DAY    OMEPRAZOLE (PRILOSEC) 20 MG CAPSULE    Take 1 capsule (20 mg total) by mouth once daily.    ONETOUCH DELICA PLUS LANCET 33 GAUGE MISC    USE TO TEST BLOOD SUGAR DAILY    ONETOUCH ULTRA BLUE TEST STRIP STRP    TEST DAILY    VALACYCLOVIR (VALTREX) 1000 MG TABLET    Take 1 tablet (1,000 mg total) by mouth 3 (three) times daily. for 7 days   Discontinued Medications    EMPAGLIFLOZIN (JARDIANCE) 10 MG TABLET    Take 1 tablet (10 mg total) by mouth once daily.    TIRZEPATIDE (MOUNJARO) 2.5 MG/0.5 ML PNIJ    Inject 2.5 mg into the skin every 7 days.       I personally reviewed past medical, family and social history.    Patient Active Problem List   Diagnosis    Hypertension    GERD (gastroesophageal reflux disease)    Lumbar disc disease    Myalgia and myositis    Acute bilateral low back pain without sciatica    Decreased range of motion of intervertebral discs of lumbar spine    Type II diabetes mellitus    Class 1 obesity

## 2023-10-24 DIAGNOSIS — E11.9 TYPE 2 DIABETES MELLITUS WITHOUT COMPLICATION, WITHOUT LONG-TERM CURRENT USE OF INSULIN: ICD-10-CM

## 2023-10-25 RX ORDER — TIRZEPATIDE 5 MG/.5ML
5 INJECTION, SOLUTION SUBCUTANEOUS
Qty: 4 PEN | Refills: 1 | Status: SHIPPED | OUTPATIENT
Start: 2023-10-25 | End: 2023-12-21 | Stop reason: SDUPTHER

## 2023-10-25 NOTE — TELEPHONE ENCOUNTER
Refill Routing Note   Medication(s) are not appropriate for processing by [facility] for the following reason(s):      New or recently adjusted medication    [facility] action(s):  Defer Care Due:  None identified            Appointments  past 12m or future 3m with PCP    Date Provider   Last Visit   10/4/2023 Xavier Fernandez, DO   Next Visit   1/4/2024 Xavier Fernandez, DO   ED visits in past 90 days: 0        Note composed:11:25 PM 10/24/2023

## 2023-10-25 NOTE — TELEPHONE ENCOUNTER
No care due was identified.  [facility] Embedded Care Due Messages. Reference number: [id number].   10/24/2023 7:29:51 PM CDT

## 2023-11-02 DIAGNOSIS — M51.9 LUMBAR DISC DISEASE: ICD-10-CM

## 2023-11-02 RX ORDER — CELECOXIB 200 MG/1
200 CAPSULE ORAL DAILY
Qty: 90 CAPSULE | Refills: 0 | Status: CANCELLED | OUTPATIENT
Start: 2023-11-02 | End: 2023-11-05

## 2023-11-03 DIAGNOSIS — M51.9 LUMBAR DISC DISEASE: ICD-10-CM

## 2023-11-03 RX ORDER — CELECOXIB 200 MG/1
200 CAPSULE ORAL DAILY
Qty: 90 CAPSULE | Refills: 0 | Status: SHIPPED | OUTPATIENT
Start: 2023-11-03 | End: 2024-01-14 | Stop reason: SDUPTHER

## 2023-11-03 NOTE — TELEPHONE ENCOUNTER
Refill Routing Note   Medication(s) are not appropriate for processing by [facility] for the following reason(s):      Medication outside of protocol    [facility] action(s):  Route Care Due:  None identified            Appointments  past 12m or future 3m with PCP    Date Provider   Last Visit   10/4/2023 Xavier Fernandez, DO   Next Visit   1/4/2024 Xavier Fernandez, DO   ED visits in past 90 days: 0        Note composed:10:51 PM 11/02/2023

## 2023-11-03 NOTE — TELEPHONE ENCOUNTER
No care due was identified.  [facility] Embedded Care Due Messages. Reference number: [id number].   11/02/2023 9:15:15 PM CDT

## 2023-11-03 NOTE — TELEPHONE ENCOUNTER
No care due was identified.  [facility] Embedded Care Due Messages. Reference number: [id number].   11/03/2023 3:20:24 PM CDT

## 2023-12-21 DIAGNOSIS — E11.9 TYPE 2 DIABETES MELLITUS WITHOUT COMPLICATION, WITHOUT LONG-TERM CURRENT USE OF INSULIN: ICD-10-CM

## 2023-12-22 RX ORDER — TIRZEPATIDE 5 MG/.5ML
5 INJECTION, SOLUTION SUBCUTANEOUS
Qty: 12 PEN | Refills: 1 | Status: SHIPPED | OUTPATIENT
Start: 2023-12-22 | End: 2024-02-27 | Stop reason: SDUPTHER

## 2023-12-22 NOTE — TELEPHONE ENCOUNTER
Refill Routing Note   Medication(s) are not appropriate for processing by [facility] for the following reason(s):        New or recently adjusted medication:     [facility] action(s):  Defer      Medication Therapy Plan:  FLOS 1.13.2024      Appointments  past 12m or future 3m with PCP    Date Provider   Last Visit   10/4/2023 Xavier Fernandez, DO   Next Visit   1/4/2024 Xavier Fernandez, DO   ED visits in past 90 days: 0        Note composed:12:27 PM 12/22/2023

## 2024-01-12 ENCOUNTER — LAB VISIT (OUTPATIENT)
Dept: LAB | Facility: HOSPITAL | Age: 49
End: 2024-01-12
Attending: INTERNAL MEDICINE
Payer: MEDICAID

## 2024-01-12 DIAGNOSIS — I10 PRIMARY HYPERTENSION: ICD-10-CM

## 2024-01-12 DIAGNOSIS — Z13.29 SCREENING FOR THYROID DISORDER: ICD-10-CM

## 2024-01-12 DIAGNOSIS — E11.9 TYPE 2 DIABETES MELLITUS WITHOUT COMPLICATION, WITHOUT LONG-TERM CURRENT USE OF INSULIN: ICD-10-CM

## 2024-01-12 LAB
ALBUMIN SERPL BCP-MCNC: 3.8 G/DL (ref 3.5–5.2)
ALP SERPL-CCNC: 57 U/L (ref 55–135)
ALT SERPL W/O P-5'-P-CCNC: 27 U/L (ref 10–44)
ANION GAP SERPL CALC-SCNC: 6 MMOL/L (ref 8–16)
AST SERPL-CCNC: 19 U/L (ref 10–40)
BASOPHILS # BLD AUTO: 0.06 K/UL (ref 0–0.2)
BASOPHILS NFR BLD: 0.6 % (ref 0–1.9)
BILIRUB SERPL-MCNC: 0.7 MG/DL (ref 0.1–1)
BUN SERPL-MCNC: 14 MG/DL (ref 6–20)
CALCIUM SERPL-MCNC: 9.7 MG/DL (ref 8.7–10.5)
CHLORIDE SERPL-SCNC: 107 MMOL/L (ref 95–110)
CHOLEST SERPL-MCNC: 119 MG/DL (ref 120–199)
CHOLEST/HDLC SERPL: 3.6 {RATIO} (ref 2–5)
CO2 SERPL-SCNC: 27 MMOL/L (ref 23–29)
CREAT SERPL-MCNC: 1 MG/DL (ref 0.5–1.4)
DIFFERENTIAL METHOD BLD: ABNORMAL
EOSINOPHIL # BLD AUTO: 0.2 K/UL (ref 0–0.5)
EOSINOPHIL NFR BLD: 2.5 % (ref 0–8)
ERYTHROCYTE [DISTWIDTH] IN BLOOD BY AUTOMATED COUNT: 12.3 % (ref 11.5–14.5)
EST. GFR  (NO RACE VARIABLE): >60 ML/MIN/1.73 M^2
ESTIMATED AVG GLUCOSE: 128 MG/DL (ref 68–131)
GLUCOSE SERPL-MCNC: 129 MG/DL (ref 70–110)
HBA1C MFR BLD: 6.1 % (ref 4–5.6)
HCT VFR BLD AUTO: 49.6 % (ref 40–54)
HDLC SERPL-MCNC: 33 MG/DL (ref 40–75)
HDLC SERPL: 27.7 % (ref 20–50)
HGB BLD-MCNC: 16.7 G/DL (ref 14–18)
IMM GRANULOCYTES # BLD AUTO: 0.02 K/UL (ref 0–0.04)
IMM GRANULOCYTES NFR BLD AUTO: 0.2 % (ref 0–0.5)
LDLC SERPL CALC-MCNC: 59.8 MG/DL (ref 63–159)
LYMPHOCYTES # BLD AUTO: 3.4 K/UL (ref 1–4.8)
LYMPHOCYTES NFR BLD: 35 % (ref 18–48)
MCH RBC QN AUTO: 32.6 PG (ref 27–31)
MCHC RBC AUTO-ENTMCNC: 33.7 G/DL (ref 32–36)
MCV RBC AUTO: 97 FL (ref 82–98)
MONOCYTES # BLD AUTO: 0.7 K/UL (ref 0.3–1)
MONOCYTES NFR BLD: 6.8 % (ref 4–15)
NEUTROPHILS # BLD AUTO: 5.3 K/UL (ref 1.8–7.7)
NEUTROPHILS NFR BLD: 54.9 % (ref 38–73)
NONHDLC SERPL-MCNC: 86 MG/DL
NRBC BLD-RTO: 0 /100 WBC
PLATELET # BLD AUTO: 308 K/UL (ref 150–450)
PMV BLD AUTO: 10.5 FL (ref 9.2–12.9)
POTASSIUM SERPL-SCNC: 4.3 MMOL/L (ref 3.5–5.1)
PROT SERPL-MCNC: 7.7 G/DL (ref 6–8.4)
RBC # BLD AUTO: 5.13 M/UL (ref 4.6–6.2)
SODIUM SERPL-SCNC: 140 MMOL/L (ref 136–145)
TRIGL SERPL-MCNC: 131 MG/DL (ref 30–150)
TSH SERPL DL<=0.005 MIU/L-ACNC: 1.34 UIU/ML (ref 0.4–4)
WBC # BLD AUTO: 9.67 K/UL (ref 3.9–12.7)

## 2024-01-12 PROCEDURE — 80053 COMPREHEN METABOLIC PANEL: CPT | Performed by: INTERNAL MEDICINE

## 2024-01-12 PROCEDURE — 36415 COLL VENOUS BLD VENIPUNCTURE: CPT | Mod: PO | Performed by: INTERNAL MEDICINE

## 2024-01-12 PROCEDURE — 84443 ASSAY THYROID STIM HORMONE: CPT | Performed by: INTERNAL MEDICINE

## 2024-01-12 PROCEDURE — 83036 HEMOGLOBIN GLYCOSYLATED A1C: CPT | Performed by: INTERNAL MEDICINE

## 2024-01-12 PROCEDURE — 80061 LIPID PANEL: CPT | Performed by: INTERNAL MEDICINE

## 2024-01-12 PROCEDURE — 85025 COMPLETE CBC W/AUTO DIFF WBC: CPT | Performed by: INTERNAL MEDICINE

## 2024-01-14 DIAGNOSIS — K21.9 GASTROESOPHAGEAL REFLUX DISEASE, UNSPECIFIED WHETHER ESOPHAGITIS PRESENT: Primary | ICD-10-CM

## 2024-01-14 DIAGNOSIS — M51.9 LUMBAR DISC DISEASE: ICD-10-CM

## 2024-01-15 RX ORDER — CELECOXIB 200 MG/1
200 CAPSULE ORAL DAILY
Qty: 90 CAPSULE | Refills: 1 | Status: SHIPPED | OUTPATIENT
Start: 2024-01-15 | End: 2024-07-13

## 2024-01-15 NOTE — TELEPHONE ENCOUNTER
No care due was identified.  Health [facility] Embedded Care Due Messages. Reference number: [id number].   1/14/2024 7:22:10 PM CST

## 2024-01-15 NOTE — TELEPHONE ENCOUNTER
No care due was identified.  Health [facility] Embedded Care Due Messages. Reference number: [id number].   1/14/2024 7:23:06 PM CST

## 2024-01-16 ENCOUNTER — OFFICE VISIT (OUTPATIENT)
Dept: FAMILY MEDICINE | Facility: CLINIC | Age: 49
End: 2024-01-16
Payer: MEDICAID

## 2024-01-16 DIAGNOSIS — E11.9 TYPE 2 DIABETES MELLITUS WITHOUT COMPLICATION, WITHOUT LONG-TERM CURRENT USE OF INSULIN: Primary | ICD-10-CM

## 2024-01-16 DIAGNOSIS — E66.9 CLASS 1 OBESITY: ICD-10-CM

## 2024-01-16 PROCEDURE — 3044F HG A1C LEVEL LT 7.0%: CPT | Mod: CPTII,95,, | Performed by: INTERNAL MEDICINE

## 2024-01-16 PROCEDURE — 1159F MED LIST DOCD IN RCRD: CPT | Mod: CPTII,95,, | Performed by: INTERNAL MEDICINE

## 2024-01-16 PROCEDURE — 99213 OFFICE O/P EST LOW 20 MIN: CPT | Mod: 95,,, | Performed by: INTERNAL MEDICINE

## 2024-01-16 PROCEDURE — 1160F RVW MEDS BY RX/DR IN RCRD: CPT | Mod: CPTII,95,, | Performed by: INTERNAL MEDICINE

## 2024-01-16 RX ORDER — OMEPRAZOLE 20 MG/1
20 CAPSULE, DELAYED RELEASE ORAL DAILY
Qty: 90 CAPSULE | Refills: 2 | Status: SHIPPED | OUTPATIENT
Start: 2024-01-16 | End: 2024-01-19 | Stop reason: SDUPTHER

## 2024-01-16 NOTE — PROGRESS NOTES
Patient ID: Jose Boggs is a 48 y.o. male.    Chief Complaint: No chief complaint on file.    Visit type: VIRTUAL    Assessment and Plan      1. Type 2 diabetes mellitus without complication, without long-term current use of insulin  - Hemoglobin A1C; Future  - Microalbumin/Creatinine Ratio, Urine; Future    2. Class 1 obesity     Continue current medications     HPI     Follow up labs.     1. Type 2 diabetes mellitus without complication, without long-term current use of insulin   - improved, A1c 6.1, was 7.6. taking mounjaro 5 mg   2. Class 1 obesity   - losing inches and lost 8 lbs         Review of Systems   Constitutional:  Negative for fatigue.   Cardiovascular:  Negative for chest pain.   Endocrine: Positive for polyuria. Negative for polydipsia and polyphagia.   Skin:  Negative for pallor.   Neurological:  Negative for dizziness, tremors, seizures, speech difficulty, weakness and headaches.   Psychiatric/Behavioral:  Negative for confusion. The patient is not nervous/anxious.      Medication List with Changes/Refills   Current Medications    ACYCLOVIR 5% (ZOVIRAX) 5 % CREA    Apply topically 5 (five) times daily.    ATORVASTATIN (LIPITOR) 10 MG TABLET    Take 1 tablet (10 mg total) by mouth once daily.    BLOOD SUGAR DIAGNOSTIC STRP    Use to check blood sugar 3 times daily    BLOOD-GLUCOSE METER (ONETOUCH VERIO FLEX METER) MISC    Use as directed to check blood glucose    CELECOXIB (CELEBREX) 200 MG CAPSULE    Take 1 capsule (200 mg total) by mouth once daily.    CELECOXIB (CELEBREX) 200 MG CAPSULE    Take 1 capsule (200 mg total) by mouth once daily.    CLOTRIMAZOLE (LOTRIMIN) 1 % CREAM        CLOTRIMAZOLE-BETAMETHASONE 1-0.05% (LOTRISONE) CREAM    APPLY TO AFFECTED AREA TWICE A DAY    OLMESARTAN (BENICAR) 20 MG TABLET    TAKE 1 TABLET(20 MG) BY MOUTH EVERY DAY    OMEPRAZOLE (PRILOSEC) 20 MG CAPSULE    Take 1 capsule (20 mg total) by mouth once daily.    ONETOUCH DELICA PLUS LANCET 33 GAUGE [facility]     USE TO TEST BLOOD SUGAR DAILY    ONETOUCH ULTRA BLUE TEST STRIP STRP    TEST DAILY    TIRZEPATIDE (MOUNJARO) 5 MG/0.5 ML PNIJ    Inject 5 mg into the skin every 7 days.    VALACYCLOVIR (VALTREX) 1000 MG TABLET    Take 1 tablet (1,000 mg total) by mouth 3 (three) times daily. for 7 days        The patient location is:  [place]  The chief complaint leading to consultation is: follow up labs  Face to Face time with patient: 15 min   minutes of total time spent on the encounter, which includes face to face time and   non-face to face time preparing to see the patient (eg, review of tests), Obtaining and/or   reviewing separately obtained history, Documenting clinical information in the electronic   or other health record, Independently interpreting results (not separately reported) and   communicating results to the patient/family/caregiver, or   Care coordination (not separately reported).     Each patient to whom he or she provides medical services by telemedicine is:    (1) informed of the relationship between the physician and patient and the respective   role of any other health care provider with respect to management of the patient; and   (2) notified that he or she may decline to receive medical services by telemedicine and   may withdraw from such care at any time.    I personally reviewed past medical, family and social history.              Answers submitted by the patient for this visit:  Diabetes Questionnaire (Submitted on 1/16/2024)  Chief Complaint: Diabetes problem  Diabetes type: type 2  MedicAlert ID: No  Disease duration: 5 Years  blurred vision: No  foot paresthesias: No  foot ulcerations: No  visual change: No  weight loss: Yes  Symptom course: improving  hunger: No  mood changes: No  sleepiness: No  sweats: No  blackouts: No  hospitalization: No  nocturnal hypoglycemia: No  required assistance: No  required glucagon: No  CVA: No  heart disease: No  impotence: No  nephropathy: No  peripheral  neuropathy: No  PVD: No  retinopathy: No  autonomic neuropathy: No  CAD risks: dyslipidemia, family history, hypertension, obesity, diabetes mellitus, male sex  Current treatments: oral agent (monotherapy)  Treatment compliance: some of the time  Dose schedule: at bedtime  Given by: significant other  Injection sites: abdominal wall  Home blood tests: 3-4 x per week  Home urines: <1 x per month  Monitoring compliance: fair  Blood glucose trend: decreasing steadily  Weight trend: fluctuating minimally  Current diet: generally healthy  Meal planning: avoidance of concentrated sweets  Exercise: rarely  Dietitian visit: No  Eye exam current: Yes  Sees podiatrist: No

## 2024-01-16 NOTE — TELEPHONE ENCOUNTER
Refill Decision Note   Jose Boggs  is requesting a refill authorization.  Brief Assessment and Rationale for Refill:  Approve     Medication Therapy Plan:         Comments:     Note composed:10:51 AM 01/16/2024

## 2024-01-19 DIAGNOSIS — K21.9 GASTROESOPHAGEAL REFLUX DISEASE, UNSPECIFIED WHETHER ESOPHAGITIS PRESENT: ICD-10-CM

## 2024-01-19 RX ORDER — OMEPRAZOLE 20 MG/1
20 CAPSULE, DELAYED RELEASE ORAL
Qty: 90 CAPSULE | Refills: 2 | OUTPATIENT
Start: 2024-01-19

## 2024-01-19 NOTE — TELEPHONE ENCOUNTER
Refill Decision Note   Jose Ambrose  is requesting a refill authorization.  Brief Assessment and Rationale for Refill:  Quick Discontinue     Medication Therapy Plan: E-Prescribing Status: Receipt confirmed by pharmacy (1/16/2024 10:52 AM CST)      Comments:     Note composed:12:29 PM 01/19/2024

## 2024-01-19 NOTE — TELEPHONE ENCOUNTER
No care due was identified.  [facility] Embedded Care Due Messages. Reference number: [id number].   1/19/2024 3:46:09 AM CST

## 2024-01-20 RX ORDER — OMEPRAZOLE 20 MG/1
20 CAPSULE, DELAYED RELEASE ORAL DAILY
Qty: 90 CAPSULE | Refills: 3 | Status: SHIPPED | OUTPATIENT
Start: 2024-01-20

## 2024-01-20 NOTE — TELEPHONE ENCOUNTER
No care due was identified.  [facility] Embedded Care Due Messages. Reference number: [id number].   1/19/2024 7:21:46 PM CST

## 2024-01-21 NOTE — TELEPHONE ENCOUNTER
Refill Decision Note   Jose Boggs  is requesting a refill authorization.  Brief Assessment and Rationale for Refill:  Approve     Medication Therapy Plan:  Patient requesting for rx to be sent to [facility]      Comments:     Note composed:10:02 PM 01/20/2024

## 2024-01-28 DIAGNOSIS — I10 PRIMARY HYPERTENSION: ICD-10-CM

## 2024-01-28 NOTE — TELEPHONE ENCOUNTER
No care due was identified.  [facility] Embedded Care Due Messages. Reference number: [id number].   1/28/2024 5:49:29 AM CST

## 2024-01-29 RX ORDER — OLMESARTAN MEDOXOMIL 20 MG/1
20 TABLET ORAL
Qty: 90 TABLET | Refills: 2 | Status: SHIPPED | OUTPATIENT
Start: 2024-01-29

## 2024-01-29 NOTE — TELEPHONE ENCOUNTER
Refill Decision Note   Jose Boggs  is requesting a refill authorization.  Brief Assessment and Rationale for Refill:  Approve     Medication Therapy Plan:         Comments:     Note composed:2:48 AM 01/29/2024

## 2024-01-30 ENCOUNTER — PATIENT MESSAGE (OUTPATIENT)
Dept: FAMILY MEDICINE | Facility: CLINIC | Age: 49
End: 2024-01-30
Payer: MEDICAID

## 2024-02-07 DIAGNOSIS — Z12.11 COLON CANCER SCREENING: ICD-10-CM

## 2024-02-27 ENCOUNTER — OFFICE VISIT (OUTPATIENT)
Dept: FAMILY MEDICINE | Facility: CLINIC | Age: 49
End: 2024-02-27
Payer: MEDICAID

## 2024-02-27 VITALS
HEIGHT: 74 IN | OXYGEN SATURATION: 98 % | HEART RATE: 100 BPM | BODY MASS INDEX: 33.07 KG/M2 | SYSTOLIC BLOOD PRESSURE: 132 MMHG | WEIGHT: 257.69 LBS | DIASTOLIC BLOOD PRESSURE: 86 MMHG

## 2024-02-27 DIAGNOSIS — I10 PRIMARY HYPERTENSION: Primary | ICD-10-CM

## 2024-02-27 DIAGNOSIS — Z12.11 SCREENING FOR COLON CANCER: ICD-10-CM

## 2024-02-27 DIAGNOSIS — E66.9 CLASS 1 OBESITY: ICD-10-CM

## 2024-02-27 DIAGNOSIS — E11.9 TYPE 2 DIABETES MELLITUS WITHOUT COMPLICATION, WITHOUT LONG-TERM CURRENT USE OF INSULIN: ICD-10-CM

## 2024-02-27 DIAGNOSIS — Z00.00 ANNUAL PHYSICAL EXAM: ICD-10-CM

## 2024-02-27 DIAGNOSIS — N32.81 OVERACTIVE BLADDER: ICD-10-CM

## 2024-02-27 PROCEDURE — 99214 OFFICE O/P EST MOD 30 MIN: CPT | Mod: PBBFAC,PO | Performed by: INTERNAL MEDICINE

## 2024-02-27 PROCEDURE — 4010F ACE/ARB THERAPY RXD/TAKEN: CPT | Mod: CPTII,,, | Performed by: INTERNAL MEDICINE

## 2024-02-27 PROCEDURE — 3079F DIAST BP 80-89 MM HG: CPT | Mod: CPTII,,, | Performed by: INTERNAL MEDICINE

## 2024-02-27 PROCEDURE — 1159F MED LIST DOCD IN RCRD: CPT | Mod: CPTII,,, | Performed by: INTERNAL MEDICINE

## 2024-02-27 PROCEDURE — 3008F BODY MASS INDEX DOCD: CPT | Mod: CPTII,,, | Performed by: INTERNAL MEDICINE

## 2024-02-27 PROCEDURE — 1160F RVW MEDS BY RX/DR IN RCRD: CPT | Mod: CPTII,,, | Performed by: INTERNAL MEDICINE

## 2024-02-27 PROCEDURE — 99214 OFFICE O/P EST MOD 30 MIN: CPT | Mod: S$PBB,,, | Performed by: INTERNAL MEDICINE

## 2024-02-27 PROCEDURE — 3044F HG A1C LEVEL LT 7.0%: CPT | Mod: CPTII,,, | Performed by: INTERNAL MEDICINE

## 2024-02-27 PROCEDURE — 99999 PR PBB SHADOW E&M-EST. PATIENT-LVL IV: CPT | Mod: PBBFAC,,, | Performed by: INTERNAL MEDICINE

## 2024-02-27 PROCEDURE — 3075F SYST BP GE 130 - 139MM HG: CPT | Mod: CPTII,,, | Performed by: INTERNAL MEDICINE

## 2024-02-27 RX ORDER — TIRZEPATIDE 5 MG/.5ML
5 INJECTION, SOLUTION SUBCUTANEOUS
Qty: 12 PEN | Refills: 0 | Status: SHIPPED | OUTPATIENT
Start: 2024-02-27 | End: 2024-06-09 | Stop reason: SDUPTHER

## 2024-02-27 RX ORDER — OXYBUTYNIN CHLORIDE 5 MG/1
5 TABLET, EXTENDED RELEASE ORAL DAILY
Qty: 30 TABLET | Refills: 2 | Status: SHIPPED | OUTPATIENT
Start: 2024-02-27 | End: 2025-02-26

## 2024-02-27 NOTE — PROGRESS NOTES
Patient ID: Jose Boggs is a 48 y.o. male.    Chief Complaint: Follow-up and Urinary Frequency      Assessment and plan     1. Primary hypertension    2. Type 2 diabetes mellitus without complication, without long-term current use of insulin  - tirzepatide (MOUNJARO) 5 mg/0.5 mL PnIj; Inject 5 mg into the skin every 7 days.  Dispense: 12 pen ; Refill: 0    3. Class 1 obesity    4. Overactive bladder  - oxybutynin (DITROPAN-XL) 5 MG TR24; Take 1 tablet (5 mg total) by mouth once daily.  Dispense: 30 tablet; Refill: 2  - Urinalysis, Reflex to Urine Culture Urine, Clean Catch; Future    5. Screening for colon cancer  - Fecal Immunochemical Test (iFOBT); Future    6. Annual physical exam     Current medications   Oxybutynin trial   Follow-up in 1 month   Monitor blood pressure at least 3 times a week     HPI     Annual     Medications, recent labs, health maintenance, and diet has been reviewed.    Exercise- needs improvement   Alcohol- none   Tobacco- none     Urinary frequency since teenager. Overactive bladder in fhx. Goes about 12x / day. He does have some dibbling. Drinks a lot of water, coffee, tea throughout day.     [employer] was given to patient on 2/27/2024 4:13 PM        1. Primary hypertension   - controlled on olmesartan.    2. Type 2 diabetes mellitus without complication, without long-term current use of insulin   - controlled on mounjaro.    3. Class 1 obesity   - losing weight with mounjaro         Review of Systems   Constitutional:  Negative for fever.   Respiratory:  Negative for shortness of breath.    Cardiovascular:  Negative for chest pain.   Gastrointestinal:  Negative for abdominal pain.   Genitourinary:  Positive for frequency.        Objective     Vitals:    02/27/24 1521   BP: 132/86   Pulse: 100     Wt Readings from Last 3 Encounters:   02/27/24 1521 116.9 kg (257 lb 11.5 oz)   10/04/23 1422 119.6 kg (263 lb 10.7 oz)   06/26/23 1450 118.6 kg (261 lb 7.5 oz)      Body mass index is  33.09 kg/m².     Physical Exam  Cardiovascular:      Rate and Rhythm: Normal rate and regular rhythm.      Heart sounds: No murmur heard.     No gallop.   Pulmonary:      Breath sounds: Normal breath sounds. No wheezing or rhonchi.   Abdominal:      Palpations: Abdomen is soft.      Tenderness: There is no abdominal tenderness.         Diabetes Medications               tirzepatide (MOUNJARO) 5 mg/0.5 mL PnIj Inject 5 mg into the skin every 7 days.           Hypertension Medications               olmesartan (BENICAR) 20 MG tablet TAKE 1 TABLET(20 MG) BY MOUTH EVERY DAY           Hyperlipidemia Medications               atorvastatin (LIPITOR) 10 MG tablet Take 1 tablet (10 mg total) by mouth once daily.           Medication List with Changes/Refills   New Medications    OXYBUTYNIN (DITROPAN-XL) 5 MG TR24    Take 1 tablet (5 mg total) by mouth once daily.   Current Medications    ACYCLOVIR 5% (ZOVIRAX) 5 % CREA    Apply topically 5 (five) times daily.    ATORVASTATIN (LIPITOR) 10 MG TABLET    Take 1 tablet (10 mg total) by mouth once daily.    BLOOD SUGAR DIAGNOSTIC STRP    Use to check blood sugar 3 times daily    BLOOD-GLUCOSE METER (ONETOUCH VERIO FLEX METER) MISC    Use as directed to check blood glucose    CELECOXIB (CELEBREX) 200 MG CAPSULE    Take 1 capsule (200 mg total) by mouth once daily.    CELECOXIB (CELEBREX) 200 MG CAPSULE    Take 1 capsule (200 mg total) by mouth once daily.    CLOTRIMAZOLE (LOTRIMIN) 1 % CREAM        CLOTRIMAZOLE-BETAMETHASONE 1-0.05% (LOTRISONE) CREAM    APPLY TO AFFECTED AREA TWICE A DAY    OLMESARTAN (BENICAR) 20 MG TABLET    TAKE 1 TABLET(20 MG) BY MOUTH EVERY DAY    OMEPRAZOLE (PRILOSEC) 20 MG CAPSULE    Take 1 capsule (20 mg total) by mouth once daily.    ONETOUCH DELICA PLUS LANCET 33 GAUGE MISC    USE TO TEST BLOOD SUGAR DAILY    ONETOUCH ULTRA BLUE TEST STRIP STRP    TEST DAILY    VALACYCLOVIR (VALTREX) 1000 MG TABLET    Take 1 tablet (1,000 mg total) by mouth 3 (three) times  daily. for 7 days   Changed and/or Refilled Medications    Modified Medication Previous Medication    TIRZEPATIDE (MOUNJARO) 5 MG/0.5 ML PNIJ tirzepatide (MOUNJARO) 5 mg/0.5 mL PnIj       Inject 5 mg into the skin every 7 days.    Inject 5 mg into the skin every 7 days.       I personally reviewed past medical, family and social history.

## 2024-03-04 ENCOUNTER — PATIENT MESSAGE (OUTPATIENT)
Dept: FAMILY MEDICINE | Facility: CLINIC | Age: 49
End: 2024-03-04
Payer: MEDICAID

## 2024-03-05 ENCOUNTER — HOSPITAL ENCOUNTER (OUTPATIENT)
Dept: RADIOLOGY | Facility: HOSPITAL | Age: 49
Discharge: HOME | End: 2024-03-05
Attending: INTERNAL MEDICINE
Payer: MEDICAID

## 2024-03-05 ENCOUNTER — PATIENT MESSAGE (OUTPATIENT)
Dept: FAMILY MEDICINE | Facility: CLINIC | Age: 49
End: 2024-03-05

## 2024-03-05 ENCOUNTER — OFFICE VISIT (OUTPATIENT)
Dept: FAMILY MEDICINE | Facility: CLINIC | Age: 49
End: 2024-03-05
Payer: MEDICAID

## 2024-03-05 VITALS
HEIGHT: 74 IN | HEART RATE: 81 BPM | OXYGEN SATURATION: 99 % | SYSTOLIC BLOOD PRESSURE: 126 MMHG | BODY MASS INDEX: 32.99 KG/M2 | WEIGHT: 257.06 LBS | DIASTOLIC BLOOD PRESSURE: 86 MMHG

## 2024-03-05 DIAGNOSIS — N22 CALCULUS OF URINARY TRACT IN DISEASES CLASSIFIED ELSEWHERE: ICD-10-CM

## 2024-03-05 DIAGNOSIS — R10.9 RIGHT FLANK PAIN: ICD-10-CM

## 2024-03-05 DIAGNOSIS — R10.9 RIGHT FLANK PAIN: Primary | ICD-10-CM

## 2024-03-05 DIAGNOSIS — E11.9 TYPE 2 DIABETES MELLITUS WITHOUT COMPLICATION, WITHOUT LONG-TERM CURRENT USE OF INSULIN: ICD-10-CM

## 2024-03-05 PROCEDURE — 1160F RVW MEDS BY RX/DR IN RCRD: CPT | Mod: CPTII,,, | Performed by: INTERNAL MEDICINE

## 2024-03-05 PROCEDURE — 99214 OFFICE O/P EST MOD 30 MIN: CPT | Mod: PBBFAC,25,PO | Performed by: INTERNAL MEDICINE

## 2024-03-05 PROCEDURE — 3044F HG A1C LEVEL LT 7.0%: CPT | Mod: CPTII,,, | Performed by: INTERNAL MEDICINE

## 2024-03-05 PROCEDURE — 1159F MED LIST DOCD IN RCRD: CPT | Mod: CPTII,,, | Performed by: INTERNAL MEDICINE

## 2024-03-05 PROCEDURE — 4010F ACE/ARB THERAPY RXD/TAKEN: CPT | Mod: CPTII,,, | Performed by: INTERNAL MEDICINE

## 2024-03-05 PROCEDURE — 3074F SYST BP LT 130 MM HG: CPT | Mod: CPTII,,, | Performed by: INTERNAL MEDICINE

## 2024-03-05 PROCEDURE — 74018 RADEX ABDOMEN 1 VIEW: CPT | Mod: TC,FY,PO

## 2024-03-05 PROCEDURE — 74018 RADEX ABDOMEN 1 VIEW: CPT | Mod: 26,,, | Performed by: RADIOLOGY

## 2024-03-05 PROCEDURE — 3008F BODY MASS INDEX DOCD: CPT | Mod: CPTII,,, | Performed by: INTERNAL MEDICINE

## 2024-03-05 PROCEDURE — 99999 PR PBB SHADOW E&M-EST. PATIENT-LVL IV: CPT | Mod: PBBFAC,,, | Performed by: INTERNAL MEDICINE

## 2024-03-05 PROCEDURE — 3079F DIAST BP 80-89 MM HG: CPT | Mod: CPTII,,, | Performed by: INTERNAL MEDICINE

## 2024-03-05 PROCEDURE — 99214 OFFICE O/P EST MOD 30 MIN: CPT | Mod: S$PBB,,, | Performed by: INTERNAL MEDICINE

## 2024-03-05 RX ORDER — ATORVASTATIN CALCIUM 10 MG/1
10 TABLET, FILM COATED ORAL DAILY
Qty: 90 TABLET | Refills: 3 | Status: SHIPPED | OUTPATIENT
Start: 2024-03-05

## 2024-03-05 RX ORDER — METHOCARBAMOL 750 MG/1
750 TABLET, FILM COATED ORAL NIGHTLY PRN
Qty: 10 TABLET | Refills: 0 | Status: SHIPPED | OUTPATIENT
Start: 2024-03-05 | End: 2024-03-15

## 2024-03-05 NOTE — PROGRESS NOTES
Patient ID: Jose Boggs is a 48 y.o. male.    Chief Complaint: Pain (Patient states he has a pain in his right side lasting for a couple weeks )      Assessment and plan     1. Right flank pain  - X-Ray Abdomen AP 1 View; Future  - Urinalysis, Reflex to Urine Culture Urine, Clean Catch; Future  - methocarbamoL (ROBAXIN) 750 MG Tab; Take 1 tablet (750 mg total) by mouth nightly as needed.  Dispense: 10 tablet; Refill: 0    2. Type 2 diabetes mellitus without complication, without long-term current use of insulin  - atorvastatin (LIPITOR) 10 MG tablet; Take 1 tablet (10 mg total) by mouth once daily.  Dispense: 90 tablet; Refill: 3     Kidney stone versus musculoskeletal.  Check one-view abdomen.  Robaxin at night  Refill Lipitor     HPI     Having some right flank / side pain x 1.5 weeks. Does radiate toward the groin. Comes and goes. Taking ibuprofen which helps and ice pack last night. Currently more of a dull ache.  Started oxybutynin for overactive bladder at last appointment.  He is not seen much change yet.  Needs refill on atorvastatin    Review of Systems   Constitutional:  Negative for fever.   Respiratory:  Negative for shortness of breath.    Cardiovascular:  Negative for chest pain.   Gastrointestinal:  Negative for abdominal pain.   Genitourinary:  Positive for flank pain.        Objective     Vitals:    03/05/24 1126   BP: 126/86   Pulse: 81     Wt Readings from Last 3 Encounters:   03/05/24 1126 116.6 kg (257 lb 0.9 oz)   02/27/24 1521 116.9 kg (257 lb 11.5 oz)   10/04/23 1422 119.6 kg (263 lb 10.7 oz)      Body mass index is 33 kg/m².     Physical Exam  Cardiovascular:      Rate and Rhythm: Normal rate and regular rhythm.      Heart sounds: No murmur heard.     No gallop.   Pulmonary:      Breath sounds: Normal breath sounds. No wheezing or rhonchi.   Abdominal:      Palpations: Abdomen is soft.      Tenderness: There is no abdominal tenderness.         Diabetes Medications                tirzepatide (MOUNJARO) 5 mg/0.5 mL PnIj Inject 5 mg into the skin every 7 days.           Hypertension Medications               olmesartan (BENICAR) 20 MG tablet TAKE 1 TABLET(20 MG) BY MOUTH EVERY DAY           Hyperlipidemia Medications               atorvastatin (LIPITOR) 10 MG tablet Take 1 tablet (10 mg total) by mouth once daily.           Medication List with Changes/Refills   New Medications    METHOCARBAMOL (ROBAXIN) 750 MG TAB    Take 1 tablet (750 mg total) by mouth nightly as needed.   Current Medications    ACYCLOVIR 5% (ZOVIRAX) 5 % CREA    Apply topically 5 (five) times daily.    BLOOD SUGAR DIAGNOSTIC STRP    Use to check blood sugar 3 times daily    BLOOD-GLUCOSE METER (ONETOUCH VERIO FLEX METER) MISC    Use as directed to check blood glucose    CELECOXIB (CELEBREX) 200 MG CAPSULE    Take 1 capsule (200 mg total) by mouth once daily.    CELECOXIB (CELEBREX) 200 MG CAPSULE    Take 1 capsule (200 mg total) by mouth once daily.    CLOTRIMAZOLE (LOTRIMIN) 1 % CREAM        CLOTRIMAZOLE-BETAMETHASONE 1-0.05% (LOTRISONE) CREAM    APPLY TO AFFECTED AREA TWICE A DAY    OLMESARTAN (BENICAR) 20 MG TABLET    TAKE 1 TABLET(20 MG) BY MOUTH EVERY DAY    OMEPRAZOLE (PRILOSEC) 20 MG CAPSULE    Take 1 capsule (20 mg total) by mouth once daily.    ONETOUCH DELICA PLUS LANCET 33 GAUGE MISC    USE TO TEST BLOOD SUGAR DAILY    ONETOUCH ULTRA BLUE TEST STRIP STRP    TEST DAILY    OXYBUTYNIN (DITROPAN-XL) 5 MG TR24    Take 1 tablet (5 mg total) by mouth once daily.    TIRZEPATIDE (MOUNJARO) 5 MG/0.5 ML PNIJ    Inject 5 mg into the skin every 7 days.    VALACYCLOVIR (VALTREX) 1000 MG TABLET    Take 1 tablet (1,000 mg total) by mouth 3 (three) times daily. for 7 days   Changed and/or Refilled Medications    Modified Medication Previous Medication    ATORVASTATIN (LIPITOR) 10 MG TABLET atorvastatin (LIPITOR) 10 MG tablet       Take 1 tablet (10 mg total) by mouth once daily.    Take 1 tablet (10 mg total) by mouth once  daily.       I personally reviewed past medical, family and social history.

## 2024-03-06 ENCOUNTER — TELEPHONE (OUTPATIENT)
Dept: FAMILY MEDICINE | Facility: CLINIC | Age: 49
End: 2024-03-06
Payer: MEDICAID

## 2024-03-06 NOTE — TELEPHONE ENCOUNTER
----- Message from Alyx Morrison, Patient Care Assistant sent at 3/6/2024 10:02 AM CST -----  Regarding: orders  Contact: pt  Type: Needs Medical Advice    Who Called:  pt     Best Call Back Number: [phone] (home)     Additional Information: pt states he would like a callback regarding scheduling his CT orders. Please call to advise. Thanks!

## 2024-03-19 ENCOUNTER — PATIENT MESSAGE (OUTPATIENT)
Dept: FAMILY MEDICINE | Facility: CLINIC | Age: 49
End: 2024-03-19
Payer: MEDICAID

## 2024-03-29 ENCOUNTER — PATIENT MESSAGE (OUTPATIENT)
Dept: FAMILY MEDICINE | Facility: CLINIC | Age: 49
End: 2024-03-29
Payer: MEDICAID

## 2024-04-02 ENCOUNTER — OFFICE VISIT (OUTPATIENT)
Dept: FAMILY MEDICINE | Facility: CLINIC | Age: 49
End: 2024-04-02
Payer: MEDICAID

## 2024-04-02 VITALS
WEIGHT: 252.44 LBS | HEART RATE: 92 BPM | DIASTOLIC BLOOD PRESSURE: 84 MMHG | SYSTOLIC BLOOD PRESSURE: 122 MMHG | OXYGEN SATURATION: 98 % | HEIGHT: 73 IN | BODY MASS INDEX: 33.46 KG/M2

## 2024-04-02 DIAGNOSIS — N20.1 RIGHT URETERAL STONE: Primary | ICD-10-CM

## 2024-04-02 DIAGNOSIS — I10 PRIMARY HYPERTENSION: ICD-10-CM

## 2024-04-02 DIAGNOSIS — E11.9 TYPE 2 DIABETES MELLITUS WITHOUT COMPLICATION, WITHOUT LONG-TERM CURRENT USE OF INSULIN: ICD-10-CM

## 2024-04-02 PROCEDURE — 99999 PR PBB SHADOW E&M-EST. PATIENT-LVL V: CPT | Mod: PBBFAC,,, | Performed by: INTERNAL MEDICINE

## 2024-04-02 PROCEDURE — 99214 OFFICE O/P EST MOD 30 MIN: CPT | Mod: S$PBB,,, | Performed by: INTERNAL MEDICINE

## 2024-04-02 PROCEDURE — 3079F DIAST BP 80-89 MM HG: CPT | Mod: CPTII,,, | Performed by: INTERNAL MEDICINE

## 2024-04-02 PROCEDURE — 1160F RVW MEDS BY RX/DR IN RCRD: CPT | Mod: CPTII,,, | Performed by: INTERNAL MEDICINE

## 2024-04-02 PROCEDURE — 4010F ACE/ARB THERAPY RXD/TAKEN: CPT | Mod: CPTII,,, | Performed by: INTERNAL MEDICINE

## 2024-04-02 PROCEDURE — 99215 OFFICE O/P EST HI 40 MIN: CPT | Mod: PBBFAC,PO | Performed by: INTERNAL MEDICINE

## 2024-04-02 PROCEDURE — 3008F BODY MASS INDEX DOCD: CPT | Mod: CPTII,,, | Performed by: INTERNAL MEDICINE

## 2024-04-02 PROCEDURE — 3074F SYST BP LT 130 MM HG: CPT | Mod: CPTII,,, | Performed by: INTERNAL MEDICINE

## 2024-04-02 PROCEDURE — 3044F HG A1C LEVEL LT 7.0%: CPT | Mod: CPTII,,, | Performed by: INTERNAL MEDICINE

## 2024-04-02 PROCEDURE — 1159F MED LIST DOCD IN RCRD: CPT | Mod: CPTII,,, | Performed by: INTERNAL MEDICINE

## 2024-04-02 RX ORDER — HYDROCHLOROTHIAZIDE 12.5 MG/1
12.5 TABLET ORAL DAILY
Qty: 30 TABLET | Refills: 11 | Status: SHIPPED | OUTPATIENT
Start: 2024-04-02 | End: 2025-04-02

## 2024-04-02 RX ORDER — TAMSULOSIN HYDROCHLORIDE 0.4 MG/1
0.4 CAPSULE ORAL DAILY
Qty: 30 CAPSULE | Refills: 0 | Status: SHIPPED | OUTPATIENT
Start: 2024-04-02 | End: 2024-04-02

## 2024-04-02 RX ORDER — TAMSULOSIN HYDROCHLORIDE 0.4 MG/1
0.4 CAPSULE ORAL DAILY
Qty: 30 CAPSULE | Refills: 0 | Status: SHIPPED | OUTPATIENT
Start: 2024-04-02 | End: 2024-05-02

## 2024-04-02 NOTE — PROGRESS NOTES
Patient ID: Jose Boggs is a 48 y.o. male.    Chief Complaint: er follow up      Assessment / Plan      1. Right ureteral stone  Comments:  Restart Flomax.  He will be making decision about procedure with Urology.  Orders:  -     Discontinue: tamsulosin (FLOMAX) 0.4 mg Cap; Take 1 capsule (0.4 mg total) by mouth once daily.  Dispense: 30 capsule; Refill: 0  -     tamsulosin (FLOMAX) 0.4 mg Cap; Take 1 capsule (0.4 mg total) by mouth once daily.  Dispense: 30 capsule; Refill: 0    2. Primary hypertension  Overview:  Running 120s over high 80s at home.  May benefit from the reduction of urine calcium with hydrochlorothiazide.  Will start hydrochlorothiazide 12.5 mg.    Orders:  -     hydroCHLOROthiazide (HYDRODIURIL) 12.5 MG Tab; Take 1 tablet (12.5 mg total) by mouth once daily.  Dispense: 30 tablet; Refill: 11  -     Basic Metabolic Panel; Future; Expected date: 04/16/2024    3. Type 2 diabetes mellitus without complication, without long-term current use of insulin  Overview:  Controlled, continue mounjaro 5 mg.               HPI     ER for flank pain which confirmed stones.   1. Nonobstructing calculus in the proximal right ureter measuring 4 mm is unchanged.  No hydronephrosis or hydroureter.  2. Non-obstructing left renal calculi measuring 3 mm are unchanged.    Saw urology PA. Took flomax. They recommended ureteroscopy.     Review of Systems   Constitutional:  Negative for fever.   Respiratory:  Negative for shortness of breath.    Cardiovascular:  Negative for chest pain.   Gastrointestinal:  Negative for abdominal pain.      Objective     Vitals:    04/02/24 1418   BP: 122/84   Pulse: 92      Wt Readings from Last 3 Encounters:   04/02/24 1418 114.5 kg (252 lb 6.8 oz)   03/19/24 0944 117.3 kg (258 lb 9.6 oz)   03/05/24 1126 116.6 kg (257 lb 0.9 oz)      Body mass index is 33.3 kg/m².     Physical Exam  Cardiovascular:      Rate and Rhythm: Normal rate and regular rhythm.      Heart sounds: No  murmur heard.     No gallop.   Pulmonary:      Breath sounds: Normal breath sounds. No wheezing or rhonchi.   Abdominal:      Palpations: Abdomen is soft.      Tenderness: There is no abdominal tenderness.         Diabetes Medications               tirzepatide (MOUNJARO) 5 mg/0.5 mL PnIj Inject 5 mg into the skin every 7 days.           Hypertension Medications               olmesartan (BENICAR) 20 MG tablet TAKE 1 TABLET(20 MG) BY MOUTH EVERY DAY           Hyperlipidemia Medications               atorvastatin (LIPITOR) 10 MG tablet Take 1 tablet (10 mg total) by mouth once daily.           Medication List with Changes/Refills   New Medications    HYDROCHLOROTHIAZIDE (HYDRODIURIL) 12.5 MG TAB    Take 1 tablet (12.5 mg total) by mouth once daily.   Current Medications    ACYCLOVIR 5% (ZOVIRAX) 5 % CREA    Apply topically 5 (five) times daily.    ATORVASTATIN (LIPITOR) 10 MG TABLET    Take 1 tablet (10 mg total) by mouth once daily.    BLOOD SUGAR DIAGNOSTIC STRP    Use to check blood sugar 3 times daily    BLOOD-GLUCOSE METER (ONETOUCH VERIO FLEX METER) MISC    Use as directed to check blood glucose    CELECOXIB (CELEBREX) 200 MG CAPSULE    Take 1 capsule (200 mg total) by mouth once daily.    CELECOXIB (CELEBREX) 200 MG CAPSULE    Take 1 capsule (200 mg total) by mouth once daily.    CLOTRIMAZOLE (LOTRIMIN) 1 % CREAM        CLOTRIMAZOLE-BETAMETHASONE 1-0.05% (LOTRISONE) CREAM    APPLY TO AFFECTED AREA TWICE A DAY    HYDROCODONE-ACETAMINOPHEN (NORCO) 5-325 MG PER TABLET    Take 1 tablet by mouth every 8 (eight) hours as needed for Pain.    NAPROXEN (NAPROSYN) 500 MG TABLET    Take 1 tablet (500 mg total) by mouth 2 (two) times daily with meals.    OLMESARTAN (BENICAR) 20 MG TABLET    TAKE 1 TABLET(20 MG) BY MOUTH EVERY DAY    OMEPRAZOLE (PRILOSEC) 20 MG CAPSULE    Take 1 capsule (20 mg total) by mouth once daily.    ONDANSETRON (ZOFRAN-ODT) 4 MG TBDL    Take 1 tablet (4 mg total) by mouth every 8 (eight) hours as  needed (Nausea).    ONETOUCH DELICA PLUS LANCET 33 GAUGE MISC    USE TO TEST BLOOD SUGAR DAILY    ONETOUCH ULTRA BLUE TEST STRIP STRP    TEST DAILY    OXYBUTYNIN (DITROPAN-XL) 5 MG TR24    Take 1 tablet (5 mg total) by mouth once daily.    TIRZEPATIDE (MOUNJARO) 5 MG/0.5 ML PNIJ    Inject 5 mg into the skin every 7 days.    VALACYCLOVIR (VALTREX) 1000 MG TABLET    Take 1 tablet (1,000 mg total) by mouth 3 (three) times daily. for 7 days   Changed and/or Refilled Medications    Modified Medication Previous Medication    TAMSULOSIN (FLOMAX) 0.4 MG CAP tamsulosin (FLOMAX) 0.4 mg Cap       Take 1 capsule (0.4 mg total) by mouth once daily.    Take 1 capsule (0.4 mg total) by mouth once daily. for 10 days       I personally reviewed past medical, family and social history.

## 2024-04-27 ENCOUNTER — LAB VISIT (OUTPATIENT)
Dept: LAB | Facility: HOSPITAL | Age: 49
End: 2024-04-27
Attending: INTERNAL MEDICINE
Payer: MEDICAID

## 2024-04-27 DIAGNOSIS — I10 PRIMARY HYPERTENSION: ICD-10-CM

## 2024-04-27 LAB
ANION GAP SERPL CALC-SCNC: 8 MMOL/L (ref 8–16)
BUN SERPL-MCNC: 16 MG/DL (ref 6–20)
CALCIUM SERPL-MCNC: 9.8 MG/DL (ref 8.7–10.5)
CHLORIDE SERPL-SCNC: 103 MMOL/L (ref 95–110)
CO2 SERPL-SCNC: 26 MMOL/L (ref 23–29)
CREAT SERPL-MCNC: 1 MG/DL (ref 0.5–1.4)
EST. GFR  (NO RACE VARIABLE): >60 ML/MIN/1.73 M^2
GLUCOSE SERPL-MCNC: 123 MG/DL (ref 70–110)
POTASSIUM SERPL-SCNC: 4.1 MMOL/L (ref 3.5–5.1)
SODIUM SERPL-SCNC: 137 MMOL/L (ref 136–145)

## 2024-04-27 PROCEDURE — 36415 COLL VENOUS BLD VENIPUNCTURE: CPT | Mod: PO | Performed by: INTERNAL MEDICINE

## 2024-04-27 PROCEDURE — 80048 BASIC METABOLIC PNL TOTAL CA: CPT | Performed by: INTERNAL MEDICINE

## 2024-05-01 ENCOUNTER — OFFICE VISIT (OUTPATIENT)
Dept: FAMILY MEDICINE | Facility: CLINIC | Age: 49
End: 2024-05-01
Payer: MEDICAID

## 2024-05-01 DIAGNOSIS — I10 PRIMARY HYPERTENSION: Primary | ICD-10-CM

## 2024-05-01 PROBLEM — N20.1 URETERAL STONE: Status: ACTIVE | Noted: 2024-05-01

## 2024-05-01 PROCEDURE — 99213 OFFICE O/P EST LOW 20 MIN: CPT | Mod: 95,,, | Performed by: INTERNAL MEDICINE

## 2024-05-01 PROCEDURE — 1159F MED LIST DOCD IN RCRD: CPT | Mod: CPTII,95,, | Performed by: INTERNAL MEDICINE

## 2024-05-01 PROCEDURE — 4010F ACE/ARB THERAPY RXD/TAKEN: CPT | Mod: CPTII,95,, | Performed by: INTERNAL MEDICINE

## 2024-05-01 PROCEDURE — 3044F HG A1C LEVEL LT 7.0%: CPT | Mod: CPTII,95,, | Performed by: INTERNAL MEDICINE

## 2024-05-01 PROCEDURE — 1160F RVW MEDS BY RX/DR IN RCRD: CPT | Mod: CPTII,95,, | Performed by: INTERNAL MEDICINE

## 2024-05-01 NOTE — PROGRESS NOTES
Patient ID: Jose Boggs is a 48 y.o. male.    Chief Complaint: No chief complaint on file.    Visit type: AUDIOVISUAL VIRTUAL    Having cystoscopy, stent, and laser lithotripsy tomorrow morning.    Problem  HPI  Plan   -112/70-86.  Having occasional lightheadedness when he stands from a squatting position.  Currently taking olmesartan 20 mg and hydrochlorothiazide 12.5 mg. Continue current meds but okay to split olmesartan in half if blood pressure is running in the lower 100s systolic with lightheadedness.   Overactive bladder May consider restarting oxybutynin in the future Monitor       1. Primary hypertension          Review of Systems   Constitutional:  Negative for activity change and unexpected weight change.   HENT:  Negative for hearing loss, rhinorrhea and trouble swallowing.    Eyes:  Negative for discharge and visual disturbance.   Respiratory:  Negative for chest tightness and wheezing.    Cardiovascular:  Negative for chest pain and palpitations.   Gastrointestinal:  Negative for blood in stool, constipation, diarrhea and vomiting.   Endocrine: Negative for polydipsia and polyuria.   Genitourinary:  Negative for difficulty urinating, hematuria and urgency.   Musculoskeletal:  Negative for arthralgias, joint swelling and neck pain.   Neurological:  Negative for weakness and headaches.   Psychiatric/Behavioral:  Negative for confusion and dysphoric mood.      Wt Readings from Last 3 Encounters:   04/02/24 114.5 kg (252 lb 6.8 oz)   03/19/24 117.3 kg (258 lb 9.6 oz)   03/05/24 116.6 kg (257 lb 0.9 oz)     Hypertension Medications               hydroCHLOROthiazide (HYDRODIURIL) 12.5 MG Tab Take 1 tablet (12.5 mg total) by mouth once daily.    olmesartan (BENICAR) 20 MG tablet TAKE 1 TABLET(20 MG) BY MOUTH EVERY DAY           Diabetes Medications               tirzepatide (MOUNJARO) 5 mg/0.5 mL PnIj Inject 5 mg into the skin every 7 days.           COPD Medications       No Active  Medications           Medication List with Changes/Refills   Current Medications    ACYCLOVIR 5% (ZOVIRAX) 5 % CREA    Apply topically 5 (five) times daily.    ATORVASTATIN (LIPITOR) 10 MG TABLET    Take 1 tablet (10 mg total) by mouth once daily.    BLOOD SUGAR DIAGNOSTIC STRP    Use to check blood sugar 3 times daily    BLOOD-GLUCOSE METER (ONETOUCH VERIO FLEX METER) MISC    Use as directed to check blood glucose    CELECOXIB (CELEBREX) 200 MG CAPSULE    Take 1 capsule (200 mg total) by mouth once daily.    CLOTRIMAZOLE (LOTRIMIN) 1 % CREAM        CLOTRIMAZOLE-BETAMETHASONE 1-0.05% (LOTRISONE) CREAM    APPLY TO AFFECTED AREA TWICE A DAY    HYDROCHLOROTHIAZIDE (HYDRODIURIL) 12.5 MG TAB    Take 1 tablet (12.5 mg total) by mouth once daily.    HYDROCODONE-ACETAMINOPHEN (NORCO) 5-325 MG PER TABLET    Take 1 tablet by mouth every 8 (eight) hours as needed for Pain.    LYSINE ORAL    Take by mouth Daily.    NAPROXEN (NAPROSYN) 500 MG TABLET    Take 1 tablet (500 mg total) by mouth 2 (two) times daily with meals.    OLMESARTAN (BENICAR) 20 MG TABLET    TAKE 1 TABLET(20 MG) BY MOUTH EVERY DAY    OMEGA-3 FATTY ACIDS/FISH OIL (FISH OIL-OMEGA-3 FATTY ACIDS) 300-1,000 MG CAPSULE    Take by mouth once daily.    OMEPRAZOLE (PRILOSEC) 20 MG CAPSULE    Take 1 capsule (20 mg total) by mouth once daily.    ONDANSETRON (ZOFRAN-ODT) 4 MG TBDL    Take 1 tablet (4 mg total) by mouth every 8 (eight) hours as needed (Nausea).    ONETOUCH DELICA PLUS LANCET 33 GAUGE MISC    USE TO TEST BLOOD SUGAR DAILY    ONETOUCH ULTRA BLUE TEST STRIP STRP    TEST DAILY    OXYBUTYNIN (DITROPAN-XL) 5 MG TR24    Take 1 tablet (5 mg total) by mouth once daily.    TAMSULOSIN (FLOMAX) 0.4 MG CAP    Take 1 capsule (0.4 mg total) by mouth once daily.    TIRZEPATIDE (MOUNJARO) 5 MG/0.5 ML PNIJ    Inject 5 mg into the skin every 7 days.    VALACYCLOVIR (VALTREX) 1000 MG TABLET    Take 1 tablet (1,000 mg total) by mouth 3 (three) times daily. for 7 days           The patient location is:  [place]  The chief complaint leading to consultation is:  Hypertension  Face to Face time with patient:  15 minute  minutes of total time spent on the encounter, which includes face to face time and   non-face to face time preparing to see the patient (eg, review of tests), Obtaining and/or   reviewing separately obtained history, Documenting clinical information in the electronic   or other health record, Independently interpreting results (not separately reported) and   communicating results to the patient/family/caregiver, or   Care coordination (not separately reported).     Each patient to whom he or she provides medical services by telemedicine is:    (1) informed of the relationship between the physician and patient and the respective   role of any other health care provider with respect to management of the patient; and   (2) notified that he or she may decline to receive medical services by telemedicine and   may withdraw from such care at any time.    I personally reviewed past medical, family and social history.

## 2024-05-06 ENCOUNTER — PATIENT MESSAGE (OUTPATIENT)
Dept: FAMILY MEDICINE | Facility: CLINIC | Age: 49
End: 2024-05-06
Payer: MEDICAID

## 2024-05-08 ENCOUNTER — PATIENT MESSAGE (OUTPATIENT)
Dept: FAMILY MEDICINE | Facility: CLINIC | Age: 49
End: 2024-05-08
Payer: MEDICAID

## 2024-07-20 ENCOUNTER — LAB VISIT (OUTPATIENT)
Dept: LAB | Facility: HOSPITAL | Age: 49
End: 2024-07-20
Attending: INTERNAL MEDICINE
Payer: MEDICAID

## 2024-07-20 DIAGNOSIS — E11.9 TYPE 2 DIABETES MELLITUS WITHOUT COMPLICATION, WITHOUT LONG-TERM CURRENT USE OF INSULIN: ICD-10-CM

## 2024-07-20 LAB
ESTIMATED AVG GLUCOSE: 114 MG/DL (ref 68–131)
HBA1C MFR BLD: 5.6 % (ref 4–5.6)

## 2024-07-20 PROCEDURE — 36415 COLL VENOUS BLD VENIPUNCTURE: CPT | Mod: PO | Performed by: INTERNAL MEDICINE

## 2024-07-20 PROCEDURE — 83036 HEMOGLOBIN GLYCOSYLATED A1C: CPT | Performed by: INTERNAL MEDICINE

## 2024-07-23 ENCOUNTER — PATIENT MESSAGE (OUTPATIENT)
Dept: FAMILY MEDICINE | Facility: CLINIC | Age: 49
End: 2024-07-23
Payer: MEDICAID

## 2024-07-30 DIAGNOSIS — B35.6 JOCK ITCH: ICD-10-CM

## 2024-07-30 RX ORDER — CLOTRIMAZOLE AND BETAMETHASONE DIPROPIONATE 10; .64 MG/G; MG/G
CREAM TOPICAL
Qty: 45 G | Refills: 1 | Status: SHIPPED | OUTPATIENT
Start: 2024-07-30

## 2024-07-30 RX ORDER — CLOTRIMAZOLE AND BETAMETHASONE DIPROPIONATE 10; .64 MG/G; MG/G
CREAM TOPICAL
Qty: 45 G | Refills: 1 | Status: CANCELLED | OUTPATIENT
Start: 2024-07-30

## 2024-08-08 ENCOUNTER — OFFICE VISIT (OUTPATIENT)
Dept: FAMILY MEDICINE | Facility: CLINIC | Age: 49
End: 2024-08-08
Payer: MEDICAID

## 2024-08-08 VITALS
HEIGHT: 73 IN | HEART RATE: 99 BPM | DIASTOLIC BLOOD PRESSURE: 82 MMHG | OXYGEN SATURATION: 99 % | BODY MASS INDEX: 33.63 KG/M2 | TEMPERATURE: 98 F | SYSTOLIC BLOOD PRESSURE: 124 MMHG | WEIGHT: 253.75 LBS

## 2024-08-08 DIAGNOSIS — I10 PRIMARY HYPERTENSION: ICD-10-CM

## 2024-08-08 DIAGNOSIS — K21.9 GASTROESOPHAGEAL REFLUX DISEASE, UNSPECIFIED WHETHER ESOPHAGITIS PRESENT: ICD-10-CM

## 2024-08-08 DIAGNOSIS — E11.9 TYPE 2 DIABETES MELLITUS WITHOUT COMPLICATION, WITHOUT LONG-TERM CURRENT USE OF INSULIN: Primary | ICD-10-CM

## 2024-08-08 DIAGNOSIS — Z12.5 SCREENING FOR PROSTATE CANCER: ICD-10-CM

## 2024-08-08 DIAGNOSIS — E66.9 CLASS 1 OBESITY: ICD-10-CM

## 2024-08-08 PROCEDURE — 99214 OFFICE O/P EST MOD 30 MIN: CPT | Mod: PBBFAC,PO | Performed by: INTERNAL MEDICINE

## 2024-08-08 PROCEDURE — 1160F RVW MEDS BY RX/DR IN RCRD: CPT | Mod: CPTII,,, | Performed by: INTERNAL MEDICINE

## 2024-08-08 PROCEDURE — 99214 OFFICE O/P EST MOD 30 MIN: CPT | Mod: S$PBB,,, | Performed by: INTERNAL MEDICINE

## 2024-08-08 PROCEDURE — 3008F BODY MASS INDEX DOCD: CPT | Mod: CPTII,,, | Performed by: INTERNAL MEDICINE

## 2024-08-08 PROCEDURE — 1159F MED LIST DOCD IN RCRD: CPT | Mod: CPTII,,, | Performed by: INTERNAL MEDICINE

## 2024-08-08 PROCEDURE — 4010F ACE/ARB THERAPY RXD/TAKEN: CPT | Mod: CPTII,,, | Performed by: INTERNAL MEDICINE

## 2024-08-08 PROCEDURE — 99999 PR PBB SHADOW E&M-EST. PATIENT-LVL IV: CPT | Mod: PBBFAC,,, | Performed by: INTERNAL MEDICINE

## 2024-08-08 PROCEDURE — 3074F SYST BP LT 130 MM HG: CPT | Mod: CPTII,,, | Performed by: INTERNAL MEDICINE

## 2024-08-08 PROCEDURE — 3079F DIAST BP 80-89 MM HG: CPT | Mod: CPTII,,, | Performed by: INTERNAL MEDICINE

## 2024-08-08 PROCEDURE — 3061F NEG MICROALBUMINURIA REV: CPT | Mod: CPTII,,, | Performed by: INTERNAL MEDICINE

## 2024-08-08 PROCEDURE — 3044F HG A1C LEVEL LT 7.0%: CPT | Mod: CPTII,,, | Performed by: INTERNAL MEDICINE

## 2024-08-08 PROCEDURE — 3066F NEPHROPATHY DOC TX: CPT | Mod: CPTII,,, | Performed by: INTERNAL MEDICINE

## 2024-08-08 RX ORDER — OLMESARTAN MEDOXOMIL 20 MG/1
10 TABLET ORAL DAILY
COMMUNITY

## 2024-08-15 ENCOUNTER — PATIENT MESSAGE (OUTPATIENT)
Dept: FAMILY MEDICINE | Facility: CLINIC | Age: 49
End: 2024-08-15
Payer: MEDICAID

## 2024-08-19 DIAGNOSIS — M51.9 LUMBAR DISC DISEASE: ICD-10-CM

## 2024-08-19 RX ORDER — CELECOXIB 200 MG/1
200 CAPSULE ORAL DAILY
Qty: 90 CAPSULE | Refills: 1 | Status: CANCELLED | OUTPATIENT
Start: 2024-08-19 | End: 2025-02-15

## 2024-08-19 RX ORDER — CELECOXIB 200 MG/1
200 CAPSULE ORAL DAILY
Qty: 90 CAPSULE | Refills: 1 | Status: SHIPPED | OUTPATIENT
Start: 2024-08-19

## 2024-08-19 NOTE — TELEPHONE ENCOUNTER
No care due was identified.  Health [facility] Embedded Care Due Messages. Reference number: [id number].   8/19/2024 6:02:01 AM CDT

## 2024-08-19 NOTE — TELEPHONE ENCOUNTER
No care due was identified.  [facility] Embedded Care Due Messages. Reference number: [id number].   8/19/2024 4:15:10 PM CDT

## 2024-08-19 NOTE — TELEPHONE ENCOUNTER
No care due was identified.  Health [facility] Embedded Care Due Messages. Reference number: [id number].   8/19/2024 8:36:48 AM CDT

## 2024-08-23 DIAGNOSIS — E11.9 TYPE 2 DIABETES MELLITUS WITHOUT COMPLICATION, WITHOUT LONG-TERM CURRENT USE OF INSULIN: ICD-10-CM

## 2024-08-23 DIAGNOSIS — M51.9 LUMBAR DISC DISEASE: ICD-10-CM

## 2024-08-23 RX ORDER — CELECOXIB 200 MG/1
200 CAPSULE ORAL DAILY
Qty: 90 CAPSULE | Refills: 1 | Status: CANCELLED | OUTPATIENT
Start: 2024-08-23 | End: 2025-02-19

## 2024-08-23 NOTE — TELEPHONE ENCOUNTER
No care due was identified.  [facility] Embedded Care Due Messages. Reference number: [id number].   8/23/2024 3:17:45 PM CDT

## 2024-08-24 RX ORDER — TIRZEPATIDE 5 MG/.5ML
5 INJECTION, SOLUTION SUBCUTANEOUS
Qty: 12 PEN | Refills: 1 | Status: SHIPPED | OUTPATIENT
Start: 2024-08-24

## 2024-08-24 NOTE — TELEPHONE ENCOUNTER
Refill Decision Note   Jose Boggs  is requesting a refill authorization.  Brief Assessment and Rationale for Refill:  Approve     Medication Therapy Plan:        Comments:     Note composed:2:11 PM 08/24/2024

## 2024-08-24 NOTE — TELEPHONE ENCOUNTER
No care due was identified.  [facility] Embedded Care Due Messages. Reference number: [id number].   8/23/2024 7:53:40 PM CDT

## 2024-09-24 ENCOUNTER — PATIENT MESSAGE (OUTPATIENT)
Dept: FAMILY MEDICINE | Facility: CLINIC | Age: 49
End: 2024-09-24
Payer: MEDICAID

## 2024-09-24 DIAGNOSIS — B35.6 JOCK ITCH: ICD-10-CM

## 2024-09-24 DIAGNOSIS — E11.9 TYPE 2 DIABETES MELLITUS WITHOUT COMPLICATION, WITHOUT LONG-TERM CURRENT USE OF INSULIN: ICD-10-CM

## 2024-09-24 RX ORDER — CLOTRIMAZOLE AND BETAMETHASONE DIPROPIONATE 10; .64 MG/G; MG/G
CREAM TOPICAL
Qty: 45 G | Refills: 1 | Status: SHIPPED | OUTPATIENT
Start: 2024-09-24

## 2024-09-24 RX ORDER — CLOTRIMAZOLE AND BETAMETHASONE DIPROPIONATE 10; .64 MG/G; MG/G
CREAM TOPICAL
Qty: 45 G | Refills: 1 | Status: CANCELLED | OUTPATIENT
Start: 2024-09-24

## 2024-09-25 RX ORDER — TIRZEPATIDE 5 MG/.5ML
5 INJECTION, SOLUTION SUBCUTANEOUS
Qty: 12 PEN | Refills: 1 | Status: SHIPPED | OUTPATIENT
Start: 2024-09-25

## 2024-12-18 DIAGNOSIS — B35.6 JOCK ITCH: ICD-10-CM

## 2024-12-18 RX ORDER — CLOTRIMAZOLE AND BETAMETHASONE DIPROPIONATE 10; .64 MG/G; MG/G
CREAM TOPICAL
Qty: 45 G | Refills: 1 | Status: CANCELLED | OUTPATIENT
Start: 2024-12-18

## 2024-12-19 DIAGNOSIS — B35.6 JOCK ITCH: ICD-10-CM

## 2024-12-19 RX ORDER — CLOTRIMAZOLE AND BETAMETHASONE DIPROPIONATE 10; .64 MG/G; MG/G
CREAM TOPICAL
Qty: 45 G | Refills: 1 | Status: SHIPPED | OUTPATIENT
Start: 2024-12-19

## 2024-12-19 NOTE — TELEPHONE ENCOUNTER
Refill Routing Note   Medication(s) are not appropriate for processing by [facility] for the following reason(s):        Outside of protocol    [facility] action(s):  Route             Appointments  past 12m or future 3m with PCP    Date Provider   Last Visit   8/8/2024 Xavier Fernandez, DO   Next Visit   2/5/2025 Xavier Fernandez, DO   ED visits in past 90 days: 0        Note composed:10:08 AM 12/19/2024

## 2024-12-26 LAB
LEFT EYE DM RETINOPATHY: NEGATIVE
RIGHT EYE DM RETINOPATHY: NEGATIVE

## 2025-02-01 ENCOUNTER — LAB VISIT (OUTPATIENT)
Dept: LAB | Facility: HOSPITAL | Age: 50
End: 2025-02-01
Attending: INTERNAL MEDICINE
Payer: COMMERCIAL

## 2025-02-01 DIAGNOSIS — Z12.5 SCREENING FOR PROSTATE CANCER: ICD-10-CM

## 2025-02-01 DIAGNOSIS — E11.9 TYPE 2 DIABETES MELLITUS WITHOUT COMPLICATION, WITHOUT LONG-TERM CURRENT USE OF INSULIN: ICD-10-CM

## 2025-02-01 DIAGNOSIS — I10 PRIMARY HYPERTENSION: ICD-10-CM

## 2025-02-01 LAB
ALBUMIN SERPL BCP-MCNC: 3.8 G/DL (ref 3.5–5.2)
ALBUMIN/CREAT UR: 3 UG/MG (ref 0–30)
ALP SERPL-CCNC: 58 U/L (ref 40–150)
ALT SERPL W/O P-5'-P-CCNC: 24 U/L (ref 10–44)
ANION GAP SERPL CALC-SCNC: 8 MMOL/L (ref 8–16)
AST SERPL-CCNC: 19 U/L (ref 10–40)
BASOPHILS # BLD AUTO: 0.08 K/UL (ref 0–0.2)
BASOPHILS NFR BLD: 0.9 % (ref 0–1.9)
BILIRUB SERPL-MCNC: 0.9 MG/DL (ref 0.1–1)
BUN SERPL-MCNC: 12 MG/DL (ref 6–20)
CALCIUM SERPL-MCNC: 9.1 MG/DL (ref 8.7–10.5)
CHLORIDE SERPL-SCNC: 105 MMOL/L (ref 95–110)
CHOLEST SERPL-MCNC: 117 MG/DL (ref 120–199)
CHOLEST/HDLC SERPL: 3.2 {RATIO} (ref 2–5)
CO2 SERPL-SCNC: 26 MMOL/L (ref 23–29)
COMPLEXED PSA SERPL-MCNC: 0.41 NG/ML (ref 0–4)
CREAT SERPL-MCNC: 1.1 MG/DL (ref 0.5–1.4)
CREAT UR-MCNC: 297 MG/DL (ref 23–375)
DIFFERENTIAL METHOD BLD: NORMAL
EOSINOPHIL # BLD AUTO: 0.4 K/UL (ref 0–0.5)
EOSINOPHIL NFR BLD: 4.2 % (ref 0–8)
ERYTHROCYTE [DISTWIDTH] IN BLOOD BY AUTOMATED COUNT: 12.1 % (ref 11.5–14.5)
EST. GFR  (NO RACE VARIABLE): >60 ML/MIN/1.73 M^2
ESTIMATED AVG GLUCOSE: 123 MG/DL (ref 68–131)
GLUCOSE SERPL-MCNC: 131 MG/DL (ref 70–110)
HBA1C MFR BLD: 5.9 % (ref 4–5.6)
HCT VFR BLD AUTO: 49.5 % (ref 40–54)
HDLC SERPL-MCNC: 37 MG/DL (ref 40–75)
HDLC SERPL: 31.6 % (ref 20–50)
HGB BLD-MCNC: 16.5 G/DL (ref 14–18)
IMM GRANULOCYTES # BLD AUTO: 0.01 K/UL (ref 0–0.04)
IMM GRANULOCYTES NFR BLD AUTO: 0.1 % (ref 0–0.5)
LDLC SERPL CALC-MCNC: 57 MG/DL (ref 63–159)
LYMPHOCYTES # BLD AUTO: 2.8 K/UL (ref 1–4.8)
LYMPHOCYTES NFR BLD: 33.5 % (ref 18–48)
MCH RBC QN AUTO: 30.8 PG (ref 27–31)
MCHC RBC AUTO-ENTMCNC: 33.3 G/DL (ref 32–36)
MCV RBC AUTO: 92 FL (ref 82–98)
MICROALBUMIN UR DL<=1MG/L-MCNC: 9 UG/ML
MONOCYTES # BLD AUTO: 0.7 K/UL (ref 0.3–1)
MONOCYTES NFR BLD: 8.2 % (ref 4–15)
NEUTROPHILS # BLD AUTO: 4.5 K/UL (ref 1.8–7.7)
NEUTROPHILS NFR BLD: 53.1 % (ref 38–73)
NONHDLC SERPL-MCNC: 80 MG/DL
NRBC BLD-RTO: 0 /100 WBC
PLATELET # BLD AUTO: 323 K/UL (ref 150–450)
PMV BLD AUTO: 10.6 FL (ref 9.2–12.9)
POTASSIUM SERPL-SCNC: 5 MMOL/L (ref 3.5–5.1)
PROT SERPL-MCNC: 7.6 G/DL (ref 6–8.4)
RBC # BLD AUTO: 5.36 M/UL (ref 4.6–6.2)
SODIUM SERPL-SCNC: 139 MMOL/L (ref 136–145)
TRIGL SERPL-MCNC: 115 MG/DL (ref 30–150)
WBC # BLD AUTO: 8.49 K/UL (ref 3.9–12.7)

## 2025-02-01 PROCEDURE — 83036 HEMOGLOBIN GLYCOSYLATED A1C: CPT | Performed by: INTERNAL MEDICINE

## 2025-02-01 PROCEDURE — 84153 ASSAY OF PSA TOTAL: CPT | Performed by: INTERNAL MEDICINE

## 2025-02-01 PROCEDURE — 80061 LIPID PANEL: CPT | Performed by: INTERNAL MEDICINE

## 2025-02-01 PROCEDURE — 80053 COMPREHEN METABOLIC PANEL: CPT | Performed by: INTERNAL MEDICINE

## 2025-02-01 PROCEDURE — 82570 ASSAY OF URINE CREATININE: CPT | Performed by: INTERNAL MEDICINE

## 2025-02-01 PROCEDURE — 36415 COLL VENOUS BLD VENIPUNCTURE: CPT | Mod: PO | Performed by: INTERNAL MEDICINE

## 2025-02-01 PROCEDURE — 85025 COMPLETE CBC W/AUTO DIFF WBC: CPT | Performed by: INTERNAL MEDICINE

## 2025-02-04 ENCOUNTER — TELEPHONE (OUTPATIENT)
Dept: FAMILY MEDICINE | Facility: CLINIC | Age: 50
End: 2025-02-04
Payer: COMMERCIAL

## 2025-02-04 DIAGNOSIS — K21.9 GASTROESOPHAGEAL REFLUX DISEASE, UNSPECIFIED WHETHER ESOPHAGITIS PRESENT: ICD-10-CM

## 2025-02-04 RX ORDER — OMEPRAZOLE 20 MG/1
20 CAPSULE, DELAYED RELEASE ORAL DAILY
Qty: 90 CAPSULE | Refills: 1 | Status: SHIPPED | OUTPATIENT
Start: 2025-02-04

## 2025-02-05 ENCOUNTER — OFFICE VISIT (OUTPATIENT)
Dept: FAMILY MEDICINE | Facility: CLINIC | Age: 50
End: 2025-02-05
Payer: COMMERCIAL

## 2025-02-05 VITALS
HEART RATE: 90 BPM | SYSTOLIC BLOOD PRESSURE: 126 MMHG | HEIGHT: 73 IN | BODY MASS INDEX: 33.51 KG/M2 | WEIGHT: 252.88 LBS | OXYGEN SATURATION: 96 % | DIASTOLIC BLOOD PRESSURE: 78 MMHG

## 2025-02-05 DIAGNOSIS — E66.811 CLASS 1 OBESITY: ICD-10-CM

## 2025-02-05 DIAGNOSIS — M51.9 LUMBAR DISC DISEASE: ICD-10-CM

## 2025-02-05 DIAGNOSIS — Z23 NEED FOR PNEUMOCOCCAL 20-VALENT CONJUGATE VACCINATION: ICD-10-CM

## 2025-02-05 DIAGNOSIS — Z12.11 SCREENING FOR COLON CANCER: ICD-10-CM

## 2025-02-05 DIAGNOSIS — I10 PRIMARY HYPERTENSION: Primary | ICD-10-CM

## 2025-02-05 DIAGNOSIS — E11.9 TYPE 2 DIABETES MELLITUS WITHOUT COMPLICATION, WITHOUT LONG-TERM CURRENT USE OF INSULIN: ICD-10-CM

## 2025-02-05 DIAGNOSIS — Z79.1 LONG TERM (CURRENT) USE OF NON-STEROIDAL ANTI-INFLAMMATORIES (NSAID): ICD-10-CM

## 2025-02-05 DIAGNOSIS — K21.9 GASTROESOPHAGEAL REFLUX DISEASE, UNSPECIFIED WHETHER ESOPHAGITIS PRESENT: ICD-10-CM

## 2025-02-05 PROCEDURE — 3066F NEPHROPATHY DOC TX: CPT | Mod: CPTII,S$GLB,, | Performed by: INTERNAL MEDICINE

## 2025-02-05 PROCEDURE — 1159F MED LIST DOCD IN RCRD: CPT | Mod: CPTII,S$GLB,, | Performed by: INTERNAL MEDICINE

## 2025-02-05 PROCEDURE — 3061F NEG MICROALBUMINURIA REV: CPT | Mod: CPTII,S$GLB,, | Performed by: INTERNAL MEDICINE

## 2025-02-05 PROCEDURE — 4010F ACE/ARB THERAPY RXD/TAKEN: CPT | Mod: CPTII,S$GLB,, | Performed by: INTERNAL MEDICINE

## 2025-02-05 PROCEDURE — 1160F RVW MEDS BY RX/DR IN RCRD: CPT | Mod: CPTII,S$GLB,, | Performed by: INTERNAL MEDICINE

## 2025-02-05 PROCEDURE — 3044F HG A1C LEVEL LT 7.0%: CPT | Mod: CPTII,S$GLB,, | Performed by: INTERNAL MEDICINE

## 2025-02-05 PROCEDURE — 99999 PR PBB SHADOW E&M-EST. PATIENT-LVL IV: CPT | Mod: PBBFAC,,, | Performed by: INTERNAL MEDICINE

## 2025-02-05 PROCEDURE — 90677 PCV20 VACCINE IM: CPT | Mod: S$GLB,,, | Performed by: INTERNAL MEDICINE

## 2025-02-05 PROCEDURE — 99396 PREV VISIT EST AGE 40-64: CPT | Mod: 25,S$GLB,, | Performed by: INTERNAL MEDICINE

## 2025-02-05 PROCEDURE — 90471 IMMUNIZATION ADMIN: CPT | Mod: S$GLB,,, | Performed by: INTERNAL MEDICINE

## 2025-02-05 PROCEDURE — 3078F DIAST BP <80 MM HG: CPT | Mod: CPTII,S$GLB,, | Performed by: INTERNAL MEDICINE

## 2025-02-05 PROCEDURE — 3008F BODY MASS INDEX DOCD: CPT | Mod: CPTII,S$GLB,, | Performed by: INTERNAL MEDICINE

## 2025-02-05 PROCEDURE — 3074F SYST BP LT 130 MM HG: CPT | Mod: CPTII,S$GLB,, | Performed by: INTERNAL MEDICINE

## 2025-02-05 RX ORDER — CELECOXIB 200 MG/1
200 CAPSULE ORAL DAILY
Qty: 90 CAPSULE | Refills: 1 | Status: CANCELLED | OUTPATIENT
Start: 2025-02-05

## 2025-02-05 RX ORDER — ATORVASTATIN CALCIUM 10 MG/1
10 TABLET, FILM COATED ORAL DAILY
Qty: 90 TABLET | Refills: 3 | Status: SHIPPED | OUTPATIENT
Start: 2025-02-05 | End: 2025-02-05

## 2025-02-05 RX ORDER — ATORVASTATIN CALCIUM 10 MG/1
5 TABLET, FILM COATED ORAL DAILY
Qty: 45 TABLET | Refills: 3 | Status: SHIPPED | OUTPATIENT
Start: 2025-02-05

## 2025-02-05 RX ORDER — CELECOXIB 200 MG/1
200 CAPSULE ORAL DAILY
Qty: 90 CAPSULE | Refills: 1 | Status: SHIPPED | OUTPATIENT
Start: 2025-02-05

## 2025-02-05 RX ORDER — OLMESARTAN MEDOXOMIL 20 MG/1
10 TABLET ORAL DAILY
Qty: 45 TABLET | Refills: 3 | Status: SHIPPED | OUTPATIENT
Start: 2025-02-05

## 2025-02-05 RX ORDER — OMEPRAZOLE 20 MG/1
20 CAPSULE, DELAYED RELEASE ORAL DAILY
Qty: 90 CAPSULE | Refills: 3 | Status: SHIPPED | OUTPATIENT
Start: 2025-02-05

## 2025-02-05 RX ORDER — TIRZEPATIDE 5 MG/.5ML
5 INJECTION, SOLUTION SUBCUTANEOUS
Qty: 12 PEN | Refills: 1 | Status: SHIPPED | OUTPATIENT
Start: 2025-02-05

## 2025-02-05 NOTE — TELEPHONE ENCOUNTER
No care due was identified.  [facility] Embedded Care Due Messages. Reference number: [id number].   2/04/2025 7:27:38 PM CST

## 2025-02-05 NOTE — TELEPHONE ENCOUNTER
No care due was identified.  [facility] Embedded Care Due Messages. Reference number: [id number].   2/04/2025 7:27:11 PM CST

## 2025-02-05 NOTE — TELEPHONE ENCOUNTER
Refill Decision Note   Jose Boggs  is requesting a refill authorization.  Brief Assessment and Rationale for Refill:  Approve     Medication Therapy Plan:         Comments:     Note composed:7:55 PM 02/04/2025

## 2025-02-05 NOTE — PROGRESS NOTES
Patient ID: Jose Boggs is a 49 y.o. male.    Chief Complaint: Follow-up    annual     Problem  HPI / physical exam Plan   Type 2 diabetes mellitus  Last A1c 5.9. Controlled Continue mounjaro And atorvastatin   Hypertension  Controlled Continue olmesartan 10 mg daily   GERD Stable Continue omeprazole   Class 1 obesity.  Stable Continue healthy diet and exercise, monitor   Chronic low back pain/lumbar disc disease Stable Continue Celebrex. Monitor CBC and CMP Q6 months             Medications, recent labs, health maintenance, and diet has been reviewed.    Exercise- active at work   Alcohol- none   Tobacco- none           Review of Systems   Constitutional:  Negative for fever.   Respiratory:  Negative for shortness of breath.    Cardiovascular:  Negative for chest pain.   Gastrointestinal:  Negative for abdominal pain.      Orders     1. Class 1 obesity    2. Gastroesophageal reflux disease, unspecified whether esophagitis present  - omeprazole (PRILOSEC) 20 MG capsule; Take 1 capsule (20 mg total) by mouth once daily.  Dispense: 90 capsule; Refill: 3    3. Primary hypertension  - olmesartan (BENICAR) 20 MG tablet; Take 0.5 tablets (10 mg total) by mouth once daily.  Dispense: 45 tablet; Refill: 3    4. Type 2 diabetes mellitus without complication, without long-term current use of insulin  - atorvastatin (LIPITOR) 10 MG tablet; Take 0.5 tablets (5 mg total) by mouth once daily.  Dispense: 45 tablet; Refill: 3  - tirzepatide (MOUNJARO) 5 mg/0.5 mL PnIj; Inject 5 mg into the skin every 7 days.  Dispense: 12 Pen; Refill: 1  - Hemoglobin A1C; Future    5. Lumbar disc disease  - celecoxib (CELEBREX) 200 MG capsule; Take 1 capsule (200 mg total) by mouth once daily.  Dispense: 90 capsule; Refill: 1    6. Screening for colon cancer  - Fecal Immunochemical Test (iFOBT); Future    7. Need for pneumococcal 20-valent conjugate vaccination  - pneumoc 20-angie conj-dip cr(PF) (PREVNAR-20 (PF)) injection Syrg 0.5  mL    8. Long term (current) use of non-steroidal anti-inflammatories (nsaid)  - Comprehensive Metabolic Panel; Future  - CBC Auto Differential; Future       I personally reviewed past medical, family and social history.   Objective    Vitals:    02/05/25 1541   BP: 126/78   Pulse: 90      Wt Readings from Last 3 Encounters:   02/05/25 1541 114.7 kg (252 lb 13.9 oz)   08/08/24 1406 115.1 kg (253 lb 12 oz)   05/02/24 0601 114.3 kg (252 lb)   04/30/24 1025 114.5 kg (252 lb 6.8 oz)      Body mass index is 33.36 kg/m².     Physical Exam  Cardiovascular:      Rate and Rhythm: Normal rate and regular rhythm.      Heart sounds: No murmur heard.     No gallop.   Pulmonary:      Breath sounds: Normal breath sounds. No wheezing or rhonchi.   Abdominal:      Palpations: Abdomen is soft.      Tenderness: There is no abdominal tenderness.        Reference     : Visit today included increased complexity associated with the care of the episodic problem Primary hypertension [I10] addressed and managing the longitudinal care of the patient due to the serious and/or complex managed problem(s)     Active Problem List with Overview Notes    Diagnosis Date Noted    Ureteral stone 05/01/2024    Class 1 obesity 12/16/2021    Type II diabetes mellitus 03/31/2021     Controlled, continue mounjaro 5 mg.      Acute bilateral low back pain without sciatica 12/02/2020    Decreased range of motion of intervertebral discs of lumbar spine 12/02/2020    Lumbar disc disease 12/09/2013    Hypertension 12/14/2012    GERD (gastroesophageal reflux disease) 12/14/2012        Diabetes Medications               tirzepatide (MOUNJARO) 5 mg/0.5 mL PnIj Inject 5 mg into the skin every 7 days.           Hypertension Medications               olmesartan (BENICAR) 20 MG tablet Take 10 mg by mouth once daily.           Hyperlipidemia Medications               atorvastatin (LIPITOR) 10 MG tablet Take 1 tablet (10 mg total) by mouth once daily.    omega-3  fatty acids/fish oil (FISH OIL-OMEGA-3 FATTY ACIDS) 300-1,000 mg capsule Take by mouth once daily.           Medication List with Changes/Refills   New Medications    OMEPRAZOLE (PRILOSEC) 20 MG CAPSULE    Take 1 capsule (20 mg total) by mouth once daily.   Current Medications    ACYCLOVIR 5% (ZOVIRAX) 5 % CREA    Apply topically 5 (five) times daily.    BLOOD SUGAR DIAGNOSTIC STRP    Use to check blood sugar 3 times daily    BLOOD-GLUCOSE METER (ONETOUCH VERIO FLEX METER) MISC    Use as directed to check blood glucose    CLOTRIMAZOLE (LOTRIMIN) 1 % CREAM        CLOTRIMAZOLE-BETAMETHASONE 1-0.05% (LOTRISONE) CREAM    APPLY TO AFFECTED AREA TWICE A DAY    LYSINE ORAL    Take by mouth Daily.    OMEGA-3 FATTY ACIDS/FISH OIL (FISH OIL-OMEGA-3 FATTY ACIDS) 300-1,000 MG CAPSULE    Take by mouth once daily.    OMEPRAZOLE (PRILOSEC) 20 MG CAPSULE    Take 1 capsule (20 mg total) by mouth once daily.    ONETOUCH DELICA PLUS LANCET 33 GAUGE MISC    USE TO TEST BLOOD SUGAR DAILY    ONETOUCH ULTRA BLUE TEST STRIP STRP    TEST DAILY    VALACYCLOVIR (VALTREX) 1000 MG TABLET    Take 1 tablet by mouth daily; for a week as needed for flares   Changed and/or Refilled Medications    Modified Medication Previous Medication    ATORVASTATIN (LIPITOR) 10 MG TABLET atorvastatin (LIPITOR) 10 MG tablet       Take 0.5 tablets (5 mg total) by mouth once daily.    Take 1 tablet (10 mg total) by mouth once daily.    CELECOXIB (CELEBREX) 200 MG CAPSULE celecoxib (CELEBREX) 200 MG capsule       Take 1 capsule (200 mg total) by mouth once daily.    Take 1 capsule (200 mg total) by mouth once daily.    OLMESARTAN (BENICAR) 20 MG TABLET olmesartan (BENICAR) 20 MG tablet       Take 0.5 tablets (10 mg total) by mouth once daily.    Take 10 mg by mouth once daily.    TIRZEPATIDE (MOUNJARO) 5 MG/0.5 ML PNIJ tirzepatide (MOUNJARO) 5 mg/0.5 mL PnIj       Inject 5 mg into the skin every 7 days.    Inject 5 mg into the skin every 7 days.   Discontinued  Medications    HYDROCODONE-ACETAMINOPHEN (NORCO) 5-325 MG PER TABLET    Take 1 tablet by mouth every 6 (six) hours as needed for Pain.

## 2025-02-10 ENCOUNTER — PATIENT OUTREACH (OUTPATIENT)
Dept: ADMINISTRATIVE | Facility: HOSPITAL | Age: 50
End: 2025-02-10
Payer: COMMERCIAL

## 2025-02-18 ENCOUNTER — LAB VISIT (OUTPATIENT)
Dept: LAB | Facility: HOSPITAL | Age: 50
End: 2025-02-18
Attending: INTERNAL MEDICINE
Payer: COMMERCIAL

## 2025-02-18 DIAGNOSIS — Z12.11 SCREENING FOR COLON CANCER: ICD-10-CM

## 2025-02-18 LAB — HEMOCCULT STL QL IA: NEGATIVE

## 2025-02-18 PROCEDURE — 82274 ASSAY TEST FOR BLOOD FECAL: CPT | Performed by: INTERNAL MEDICINE

## 2025-05-05 ENCOUNTER — PATIENT MESSAGE (OUTPATIENT)
Dept: FAMILY MEDICINE | Facility: CLINIC | Age: 50
End: 2025-05-05
Payer: COMMERCIAL

## 2025-05-12 ENCOUNTER — PATIENT MESSAGE (OUTPATIENT)
Dept: FAMILY MEDICINE | Facility: CLINIC | Age: 50
End: 2025-05-12
Payer: COMMERCIAL

## 2025-05-15 ENCOUNTER — HOSPITAL ENCOUNTER (OUTPATIENT)
Dept: RADIOLOGY | Facility: HOSPITAL | Age: 50
Discharge: HOME | End: 2025-05-15
Attending: NURSE PRACTITIONER
Payer: COMMERCIAL

## 2025-05-15 ENCOUNTER — RESULTS FOLLOW-UP (OUTPATIENT)
Dept: FAMILY MEDICINE | Facility: CLINIC | Age: 50
End: 2025-05-15

## 2025-05-15 ENCOUNTER — TELEPHONE (OUTPATIENT)
Dept: FAMILY MEDICINE | Facility: CLINIC | Age: 50
End: 2025-05-15

## 2025-05-15 ENCOUNTER — OFFICE VISIT (OUTPATIENT)
Dept: FAMILY MEDICINE | Facility: CLINIC | Age: 50
End: 2025-05-15
Payer: COMMERCIAL

## 2025-05-15 VITALS
WEIGHT: 254.44 LBS | DIASTOLIC BLOOD PRESSURE: 70 MMHG | SYSTOLIC BLOOD PRESSURE: 132 MMHG | OXYGEN SATURATION: 98 % | BODY MASS INDEX: 33.72 KG/M2 | TEMPERATURE: 98 F | HEIGHT: 73 IN | HEART RATE: 91 BPM

## 2025-05-15 DIAGNOSIS — M54.12 CERVICAL RADICULOPATHY: Primary | ICD-10-CM

## 2025-05-15 DIAGNOSIS — M54.12 CERVICAL RADICULOPATHY: ICD-10-CM

## 2025-05-15 PROCEDURE — 3061F NEG MICROALBUMINURIA REV: CPT | Mod: CPTII,S$GLB,, | Performed by: NURSE PRACTITIONER

## 2025-05-15 PROCEDURE — 72040 X-RAY EXAM NECK SPINE 2-3 VW: CPT | Mod: TC,FY,PO

## 2025-05-15 PROCEDURE — 4010F ACE/ARB THERAPY RXD/TAKEN: CPT | Mod: CPTII,S$GLB,, | Performed by: NURSE PRACTITIONER

## 2025-05-15 PROCEDURE — 1160F RVW MEDS BY RX/DR IN RCRD: CPT | Mod: CPTII,S$GLB,, | Performed by: NURSE PRACTITIONER

## 2025-05-15 PROCEDURE — 3008F BODY MASS INDEX DOCD: CPT | Mod: CPTII,S$GLB,, | Performed by: NURSE PRACTITIONER

## 2025-05-15 PROCEDURE — 99213 OFFICE O/P EST LOW 20 MIN: CPT | Mod: S$GLB,,, | Performed by: NURSE PRACTITIONER

## 2025-05-15 PROCEDURE — 3044F HG A1C LEVEL LT 7.0%: CPT | Mod: CPTII,S$GLB,, | Performed by: NURSE PRACTITIONER

## 2025-05-15 PROCEDURE — 3075F SYST BP GE 130 - 139MM HG: CPT | Mod: CPTII,S$GLB,, | Performed by: NURSE PRACTITIONER

## 2025-05-15 PROCEDURE — 99999 PR PBB SHADOW E&M-EST. PATIENT-LVL V: CPT | Mod: PBBFAC,,, | Performed by: NURSE PRACTITIONER

## 2025-05-15 PROCEDURE — 1159F MED LIST DOCD IN RCRD: CPT | Mod: CPTII,S$GLB,, | Performed by: NURSE PRACTITIONER

## 2025-05-15 PROCEDURE — 3078F DIAST BP <80 MM HG: CPT | Mod: CPTII,S$GLB,, | Performed by: NURSE PRACTITIONER

## 2025-05-15 PROCEDURE — 3066F NEPHROPATHY DOC TX: CPT | Mod: CPTII,S$GLB,, | Performed by: NURSE PRACTITIONER

## 2025-05-15 PROCEDURE — 72040 X-RAY EXAM NECK SPINE 2-3 VW: CPT | Mod: 26,,, | Performed by: RADIOLOGY

## 2025-05-15 RX ORDER — METHOCARBAMOL 750 MG/1
750 TABLET, FILM COATED ORAL EVERY 8 HOURS PRN
Qty: 30 TABLET | Refills: 0 | Status: SHIPPED | OUTPATIENT
Start: 2025-05-15

## 2025-05-15 RX ORDER — METHYLPREDNISOLONE 4 MG/1
TABLET ORAL
Qty: 21 EACH | Refills: 0 | Status: SHIPPED | OUTPATIENT
Start: 2025-05-15 | End: 2025-06-05

## 2025-05-15 NOTE — PROGRESS NOTES
Subjective:       Patient ID: Jose Boggs is a 49 y.o. male.    Chief Complaint: Neck Pain (Patient stated on 05/09/2025 he started to feel pain in his neck that generates down the back of his left shoulder. Stated he doesn't know if its a pulled muscle or pinched nerve. ) and Shoulder Pain    HPI pain to left side of neck radiating to left shoulder X 3 weeks but worse over past 7 days.. Reports burning sensation to the left shoulder but denies numbness or tingling.Pt recently got a new pillow and noticed symptoms became worse with new pillow. Tried biofreeze, icy hot and ibuprofen with some relief. Pain improves when lying down but becomes worse throughout the day.    Past Medical History:   Diagnosis Date    Diabetes mellitus     Type    GERD (gastroesophageal reflux disease)     Hypertension     Low back pain        Past Surgical History:   Procedure Laterality Date    CYSTOSCOPY WITH CALCULUS EXTRACTION Right 5/2/2024    Procedure: CYSTOSCOPY, WITH CALCULUS REMOVAL;  Surgeon: Marco Alves MD;  Location: [facility] OR;  Service: Urology;  Laterality: Right;    CYSTOURETEROSCOPY WITH RETROGRADE PYELOGRAPHY AND INSERTION OF STENT INTO URETER Right 5/2/2024    Procedure: CYSTOURETEROSCOPY, WITH RETROGRADE PYELOGRAM;  Surgeon: Marco Alves MD;  Location: [facility] OR;  Service: Urology;  Laterality: Right;    LASER LITHOTRIPSY Right 5/2/2024    Procedure: LITHOTRIPSY, USING LASER- thulium;  Surgeon: Marco Alves MD;  Location: [facility] OR;  Service: Urology;  Laterality: Right;    WISDOM TOOTH EXTRACTION         Review of patient's allergies indicates:   Allergen Reactions    Slo-niacin  [niacin]      Other reaction(s): Flushing (skin)       Social History[1]    Medications Ordered Prior to Encounter[2]    Family History   Problem Relation Name Age of Onset    No Known Problems Mother      Stroke Father      Hypertension Father      Diabetes Father         Review of Systems   Musculoskeletal:  Positive for neck  "pain.       Objective:      /70 (Patient Position: Sitting)   Pulse 91   Temp 98.3 °F (36.8 °C) (Oral)   Ht 6' 1" (1.854 m)   Wt 115.4 kg (254 lb 6.6 oz)   SpO2 98%   BMI 33.57 kg/m²   Physical Exam  Vitals and nursing note reviewed.   Constitutional:       General: He is not in acute distress.     Appearance: Normal appearance. He is well-groomed.   HENT:      Head: Normocephalic.      Right Ear: Tympanic membrane, ear canal and external ear normal.      Left Ear: Tympanic membrane, ear canal and external ear normal.      Nose: Nose normal.      Mouth/Throat:      Lips: Pink.      Mouth: Mucous membranes are moist.      Pharynx: Oropharynx is clear. No oropharyngeal exudate or posterior oropharyngeal erythema.   Eyes:      Extraocular Movements: Extraocular movements intact.      Conjunctiva/sclera: Conjunctivae normal.      Pupils: Pupils are equal, round, and reactive to light.   Neck:      Comments: Pain with cervical extension  Cardiovascular:      Rate and Rhythm: Normal rate and regular rhythm.      Heart sounds: Normal heart sounds. No murmur heard.  Pulmonary:      Effort: Pulmonary effort is normal.      Breath sounds: Normal breath sounds.   Chest:      Chest wall: No tenderness.   Abdominal:      General: Bowel sounds are normal.      Palpations: Abdomen is soft.      Tenderness: There is no abdominal tenderness.   Musculoskeletal:         General: Tenderness (left trapezius muscle) present. No swelling. Normal range of motion.      Cervical back: Normal range of motion and neck supple. Tenderness present.      Right lower leg: No edema.      Left lower leg: No edema.   Lymphadenopathy:      Cervical: No cervical adenopathy.   Skin:     General: Skin is warm and dry.   Neurological:      General: No focal deficit present.      Mental Status: He is alert and oriented to person, place, and time. Mental status is at baseline.      Gait: Gait is intact.   Psychiatric:         Mood and Affect: " Mood normal.         Behavior: Behavior normal.         Assessment:       1. Cervical radiculopathy        Plan:       Cervical radiculopathy  -     X-Ray Cervical Spine AP And Lateral; Future; Expected date: 05/15/2025  -     methylPREDNISolone (MEDROL DOSEPACK) 4 mg tablet; use as directed  Dispense: 21 each; Refill: 0  -     methocarbamoL (ROBAXIN) 750 MG Tab; Take 1 tablet (750 mg total) by mouth every 8 (eight) hours as needed (muscle spasm/tightness).  Dispense: 30 tablet; Refill: 0        Medrol dosepack. Methocarbamol prn muscle tightness. Intermittent ice prn pain. Neck stretches. Will refer to PT if no improvement.         [1]   Social History  Socioeconomic History    Marital status:    Tobacco Use    Smoking status: Never    Smokeless tobacco: Never   Substance and Sexual Activity    Alcohol use: Yes     Comment: very social    Drug use: Never     Social Drivers of Health     Financial Resource Strain: Low Risk  (5/13/2025)    Overall Financial Resource Strain (CARDIA)     Difficulty of Paying Living Expenses: Not very hard   Food Insecurity: No Food Insecurity (5/13/2025)    Hunger Vital Sign     Worried About Running Out of Food in the Last Year: Never true     Ran Out of Food in the Last Year: Never true   Transportation Needs: No Transportation Needs (5/13/2025)    PRAPARE - Transportation     Lack of Transportation (Medical): No     Lack of Transportation (Non-Medical): No   Physical Activity: Sufficiently Active (5/13/2025)    Exercise Vital Sign     Days of Exercise per Week: 5 days     Minutes of Exercise per Session: 30 min   Stress: No Stress Concern Present (5/13/2025)    [employer] - Occupational Stress Questionnaire     Feeling of Stress : Not at all   Housing Stability: Low Risk  (5/13/2025)    Housing Stability Vital Sign     Unable to Pay for Housing in the Last Year: No     Number of Times Moved in the Last Year: 0     Homeless in the Last Year: No    [2]   Current Outpatient Medications on File Prior to Visit   Medication Sig Dispense Refill    atorvastatin (LIPITOR) 10 MG tablet Take ONE-HALF (5 mg total) tablet by mouth once daily. 45 tablet 3    celecoxib (CELEBREX) 200 MG capsule Take 1 capsule (200 mg total) by mouth once daily. 90 capsule 1    clotrimazole (LOTRIMIN) 1 % cream       olmesartan (BENICAR) 20 MG tablet Take 0.5 tablets (10 mg total) by mouth once daily. 45 tablet 3    omeprazole (PRILOSEC) 20 MG capsule Take 1 capsule (20 mg total) by mouth once daily. 90 capsule 1    omeprazole (PRILOSEC) 20 MG capsule Take 1 capsule (20 mg total) by mouth once daily. 90 capsule 3    tirzepatide (MOUNJARO) 5 mg/0.5 mL PnIj Inject 5 mg into the skin every 7 days. 12 Pen 1    valACYclovir (VALTREX) 1000 MG tablet Take 1 tablet by mouth daily; for a week as needed for flares 30 tablet 6    acyclovir 5% (ZOVIRAX) 5 % Crea Apply topically 5 (five) times daily. 5 g 1    blood sugar diagnostic Strp Use to check blood sugar 3 times daily 200 strip 3    blood-glucose meter (ONETOUCH VERIO FLEX METER) Misc Use as directed to check blood glucose 1 each 0    clotrimazole-betamethasone 1-0.05% (LOTRISONE) cream APPLY TO AFFECTED AREA TWICE A DAY 45 g 1    LYSINE ORAL Take by mouth Daily.      ONETOUCH DELICA PLUS LANCET 33 gauge Misc USE TO TEST BLOOD SUGAR DAILY 300 each 3    ONETOUCH ULTRA BLUE TEST STRIP Strp TEST DAILY      [DISCONTINUED] omega-3 fatty acids/fish oil (FISH OIL-OMEGA-3 FATTY ACIDS) 300-1,000 mg capsule Take by mouth once daily. (Patient not taking: Reported on 5/15/2025)       No current facility-administered medications on file prior to visit.

## 2025-05-15 NOTE — TELEPHONE ENCOUNTER
Spoke with pt and informed him that xray C spine showed degenerative changes and straightening, which is usually from muscle spasm. Will refer to PT if no improvement.

## 2025-05-16 ENCOUNTER — TELEPHONE (OUTPATIENT)
Dept: FAMILY MEDICINE | Facility: CLINIC | Age: 50
End: 2025-05-16
Payer: COMMERCIAL

## 2025-05-16 NOTE — TELEPHONE ENCOUNTER
----- Message from Stacie sent at 5/16/2025  8:49 AM CDT -----  Type:  Needs Medical AdviceWho Called: pt Would the patient rather a call back or a response via [facility]? [facility] Call Back Number:  [phone] Information:  pt states he wants to speak with his drs nurse but didn't specify why.   Please call back to advise. Thanks!

## 2025-05-16 NOTE — TELEPHONE ENCOUNTER
Spoke with the patient, he states he was just wanting to get his wife and appointment scheduled to establish care with Dr. Fernandez. Got his wife scheduled.

## 2025-06-01 ENCOUNTER — PATIENT MESSAGE (OUTPATIENT)
Dept: FAMILY MEDICINE | Facility: CLINIC | Age: 50
End: 2025-06-01
Payer: COMMERCIAL

## 2025-07-07 NOTE — TELEPHONE ENCOUNTER
Care Due:                  Date            Visit Type   Department     Provider  --------------------------------------------------------------------------------                                EP -                              PRIMARY      [facility]  Last Visit: 10-      CARE ([facility])   MEDICINE       Xavier Fernandez  Next Visit: None Scheduled  None         None Found                                                            Last  Test          Frequency    Reason                     Performed    Due Date  --------------------------------------------------------------------------------    CBC.........  12 months..  celecoxib................  02- 02-    CMP.........  12 months..  atorvastatin, celecoxib,   02- 02-                             olmesartan...............    Lipid Panel.  12 months..  atorvastatin.............  02- 02-    [facility] Embedded Care Due Messages. Reference number: [id number].   12/21/2023 7:16:23 PM CST   This document is complete and the patient is ready for discharge.

## 2025-08-01 DIAGNOSIS — M51.9 LUMBAR DISC DISEASE: ICD-10-CM

## 2025-08-01 RX ORDER — CELECOXIB 200 MG/1
200 CAPSULE ORAL DAILY
Qty: 90 CAPSULE | Refills: 1 | Status: CANCELLED | OUTPATIENT
Start: 2025-08-01

## 2025-08-01 RX ORDER — CELECOXIB 200 MG/1
200 CAPSULE ORAL DAILY
Qty: 90 CAPSULE | Refills: 1 | Status: SHIPPED | OUTPATIENT
Start: 2025-08-01

## 2025-08-01 NOTE — TELEPHONE ENCOUNTER
No care due was identified.  Health [facility] Embedded Care Due Messages. Reference number: [id number].   8/01/2025 11:40:23 AM CDT

## 2025-08-01 NOTE — TELEPHONE ENCOUNTER
Care Due:                  Date            Visit Type   Department     Provider  --------------------------------------------------------------------------------                                EP -                              [facility] FAMILY  Last Visit: 02-      CARE ([facility])   MARKUS Fernandez                              EP -                              PRIMARY      [facility] FAM[facility]  Next Visit: 08-      CARE ([facility])   MARKUS Fernandez                                                            Last  Test          Frequency    Reason                     Performed    Due Date  --------------------------------------------------------------------------------    HBA1C.......  6 months...  tirzepatide..............  02- 07-    Health [facility] Embedded Care Due Messages. Reference number: [id number].   8/01/2025 10:36:23 AM FILIPE

## 2025-08-01 NOTE — TELEPHONE ENCOUNTER
Refill Routing Note   Medication(s) are not appropriate for processing by [facility] for the following reason(s):        Outside of protocol    [facility] action(s):  Route             Appointments  past 12m or future 3m with PCP    Date Provider   Last Visit   2/5/2025 Xavier Fernandez, DO   Next Visit   8/8/2025 Xavier Fernandez, DO   ED visits in past 90 days: 0        Note composed:10:58 AM 08/01/2025

## 2025-08-02 ENCOUNTER — LAB VISIT (OUTPATIENT)
Dept: LAB | Facility: HOSPITAL | Age: 50
End: 2025-08-02
Attending: INTERNAL MEDICINE
Payer: COMMERCIAL

## 2025-08-02 DIAGNOSIS — Z79.1 LONG TERM (CURRENT) USE OF NON-STEROIDAL ANTI-INFLAMMATORIES (NSAID): ICD-10-CM

## 2025-08-02 DIAGNOSIS — E11.9 TYPE 2 DIABETES MELLITUS WITHOUT COMPLICATION, WITHOUT LONG-TERM CURRENT USE OF INSULIN: ICD-10-CM

## 2025-08-02 LAB
ABSOLUTE EOSINOPHIL (OHS): 0.3 K/UL
ABSOLUTE MONOCYTE (OHS): 0.58 K/UL (ref 0.3–1)
ABSOLUTE NEUTROPHIL COUNT (OHS): 4 K/UL (ref 1.8–7.7)
ALBUMIN SERPL BCP-MCNC: 4 G/DL (ref 3.5–5.2)
ALP SERPL-CCNC: 52 UNIT/L (ref 40–150)
ALT SERPL W/O P-5'-P-CCNC: 20 UNIT/L (ref 0–55)
ANION GAP (OHS): 7 MMOL/L (ref 8–16)
AST SERPL-CCNC: 30 UNIT/L (ref 0–50)
BASOPHILS # BLD AUTO: 0.05 K/UL
BASOPHILS NFR BLD AUTO: 0.6 %
BILIRUB SERPL-MCNC: 0.8 MG/DL (ref 0.1–1)
BUN SERPL-MCNC: 13 MG/DL (ref 6–20)
CALCIUM SERPL-MCNC: 8.8 MG/DL (ref 8.7–10.5)
CHLORIDE SERPL-SCNC: 107 MMOL/L (ref 95–110)
CO2 SERPL-SCNC: 26 MMOL/L (ref 23–29)
CREAT SERPL-MCNC: 0.9 MG/DL (ref 0.5–1.4)
EAG (OHS): 105 MG/DL (ref 68–131)
ERYTHROCYTE [DISTWIDTH] IN BLOOD BY AUTOMATED COUNT: 12.1 % (ref 11.5–14.5)
GFR SERPLBLD CREATININE-BSD FMLA CKD-EPI: >60 ML/MIN/1.73/M2
GLUCOSE SERPL-MCNC: 89 MG/DL (ref 70–110)
HBA1C MFR BLD: 5.3 % (ref 4–5.6)
HCT VFR BLD AUTO: 47.7 % (ref 40–54)
HGB BLD-MCNC: 15.7 GM/DL (ref 14–18)
IMM GRANULOCYTES # BLD AUTO: 0.01 K/UL (ref 0–0.04)
IMM GRANULOCYTES NFR BLD AUTO: 0.1 % (ref 0–0.5)
LYMPHOCYTES # BLD AUTO: 3.19 K/UL (ref 1–4.8)
MCH RBC QN AUTO: 30.6 PG (ref 27–31)
MCHC RBC AUTO-ENTMCNC: 32.9 G/DL (ref 32–36)
MCV RBC AUTO: 93 FL (ref 82–98)
NUCLEATED RBC (/100WBC) (OHS): 0 /100 WBC
PLATELET # BLD AUTO: 289 K/UL (ref 150–450)
PMV BLD AUTO: 10.7 FL (ref 9.2–12.9)
POTASSIUM SERPL-SCNC: 4 MMOL/L (ref 3.5–5.1)
PROT SERPL-MCNC: 7.3 GM/DL (ref 6–8.4)
RBC # BLD AUTO: 5.13 M/UL (ref 4.6–6.2)
RELATIVE EOSINOPHIL (OHS): 3.7 %
RELATIVE LYMPHOCYTE (OHS): 39.2 % (ref 18–48)
RELATIVE MONOCYTE (OHS): 7.1 % (ref 4–15)
RELATIVE NEUTROPHIL (OHS): 49.3 % (ref 38–73)
SODIUM SERPL-SCNC: 140 MMOL/L (ref 136–145)
WBC # BLD AUTO: 8.13 K/UL (ref 3.9–12.7)

## 2025-08-02 PROCEDURE — 83036 HEMOGLOBIN GLYCOSYLATED A1C: CPT

## 2025-08-02 PROCEDURE — 36415 COLL VENOUS BLD VENIPUNCTURE: CPT | Mod: PO

## 2025-08-02 PROCEDURE — 85025 COMPLETE CBC W/AUTO DIFF WBC: CPT

## 2025-08-02 PROCEDURE — 80053 COMPREHEN METABOLIC PANEL: CPT

## 2025-08-08 ENCOUNTER — OFFICE VISIT (OUTPATIENT)
Dept: FAMILY MEDICINE | Facility: CLINIC | Age: 50
End: 2025-08-08
Payer: COMMERCIAL

## 2025-08-08 VITALS
SYSTOLIC BLOOD PRESSURE: 110 MMHG | OXYGEN SATURATION: 97 % | WEIGHT: 247.38 LBS | DIASTOLIC BLOOD PRESSURE: 88 MMHG | HEART RATE: 82 BPM | HEIGHT: 73 IN | BODY MASS INDEX: 32.79 KG/M2

## 2025-08-08 DIAGNOSIS — Z00.00 PREVENTATIVE HEALTH CARE: Primary | ICD-10-CM

## 2025-08-08 DIAGNOSIS — K21.9 GASTROESOPHAGEAL REFLUX DISEASE WITHOUT ESOPHAGITIS: ICD-10-CM

## 2025-08-08 DIAGNOSIS — Z12.5 SCREENING FOR PROSTATE CANCER: ICD-10-CM

## 2025-08-08 DIAGNOSIS — E66.811 CLASS 1 OBESITY: ICD-10-CM

## 2025-08-08 DIAGNOSIS — M51.9 LUMBAR DISC DISEASE: ICD-10-CM

## 2025-08-08 DIAGNOSIS — I10 PRIMARY HYPERTENSION: ICD-10-CM

## 2025-08-08 DIAGNOSIS — E11.9 TYPE 2 DIABETES MELLITUS WITHOUT COMPLICATION, WITHOUT LONG-TERM CURRENT USE OF INSULIN: ICD-10-CM

## 2025-08-08 PROCEDURE — 99999 PR PBB SHADOW E&M-EST. PATIENT-LVL IV: CPT | Mod: PBBFAC,,, | Performed by: INTERNAL MEDICINE

## 2025-08-08 RX ORDER — CELECOXIB 200 MG/1
200 CAPSULE ORAL DAILY
Qty: 90 CAPSULE | Refills: 1 | Status: SHIPPED | OUTPATIENT
Start: 2025-08-08 | End: 2025-08-08

## 2025-08-08 RX ORDER — CELECOXIB 200 MG/1
200 CAPSULE ORAL DAILY
Qty: 90 CAPSULE | Refills: 1 | Status: SHIPPED | OUTPATIENT
Start: 2025-08-08

## 2025-08-08 NOTE — PROGRESS NOTES
Patient ID: Jose Boggs is a 49 y.o. male.    Chief Complaint: Hypertension       HPI / Assessment and plan:     1. Preventative health care  -     Microalbumin/Creatinine Ratio, Urine; Future; Expected date: 08/08/2025  -     Lipid Panel; Future; Expected date: 08/08/2025  -     CBC Auto Differential; Future; Expected date: 08/08/2025  -     Comprehensive Metabolic Panel; Future; Expected date: 08/08/2025  -     PSA, Screening; Future; Expected date: 08/08/2025  -     Hemoglobin A1C; Future; Expected date: 08/08/2025    2. Type 2 diabetes mellitus without complication, without long-term current use of insulin  Overview:  Controlled, continue mounjaro 5 mg.     Orders:  -     Microalbumin/Creatinine Ratio, Urine; Future; Expected date: 08/08/2025  -     Lipid Panel; Future; Expected date: 08/08/2025  -     Hemoglobin A1C; Future; Expected date: 08/08/2025    3. Primary hypertension  Overview:  Hypertension borderline on olmesartan 10 mg.  Monitor blood pressure at home and can increase olmesartan 20 mg if blood pressure remains over 130/80.     Orders:  -     CBC Auto Differential; Future; Expected date: 08/08/2025  -     Comprehensive Metabolic Panel; Future; Expected date: 08/08/2025    4. Screening for prostate cancer  -     PSA, Screening; Future; Expected date: 08/08/2025    5. Lumbar disc disease  Overview:  Stable continue Celebrex, monitor renal function and CBC    Orders:  -     Discontinue: celecoxib (CELEBREX) 200 MG capsule; Take 1 capsule (200 mg total) by mouth once daily.  Dispense: 90 capsule; Refill: 1  -     celecoxib (CELEBREX) 200 MG capsule; Take 1 capsule (200 mg total) by mouth once daily.  Dispense: 90 capsule; Refill: 1    6. Class 1 obesity  Overview:  Weight coming down with mounjaro.  Continue med, healthy diet, and exercise      7. Gastroesophageal reflux disease without esophagitis  Overview:  GERD stable on omeprazole, continue omeprazole.  We did discuss tapering off of  omeprazole by tapering while on Pepcid then tapering Pepcid.             ROS   Review of Systems   Respiratory:  Negative for shortness of breath.    Cardiovascular:  Negative for chest pain.   Gastrointestinal:  Negative for abdominal pain.      Exam   Physical Exam  Cardiovascular:      Rate and Rhythm: Normal rate and regular rhythm.   Pulmonary:      Breath sounds: Normal breath sounds. No wheezing.        Vitals    Vitals:    08/08/25 1500   BP: 110/88   Pulse:       Wt Readings from Last 3 Encounters:   08/08/25 1454 112.2 kg (247 lb 5.7 oz)   05/15/25 1522 115.4 kg (254 lb 6.6 oz)   02/05/25 1541 114.7 kg (252 lb 13.9 oz)      Body mass index is 32.63 kg/m².      Reference   Diabetes Medications              tirzepatide (MOUNJARO) 5 mg/0.5 mL PnIj Inject 5 mg into the skin every 7 days.           Hypertension Medications              olmesartan (BENICAR) 20 MG tablet Take 0.5 tablets (10 mg total) by mouth once daily.           Hyperlipidemia Medications              atorvastatin (LIPITOR) 10 MG tablet Take ONE-HALF (5 mg total) tablet by mouth once daily.                MEDICATIONS   Medication List with Changes/Refills   New Medications    CELECOXIB (CELEBREX) 200 MG CAPSULE    Take 1 capsule (200 mg total) by mouth once daily.   Current Medications    ACYCLOVIR 5% (ZOVIRAX) 5 % CREA    Apply topically 5 (five) times daily.    ATORVASTATIN (LIPITOR) 10 MG TABLET    Take ONE-HALF (5 mg total) tablet by mouth once daily.    BLOOD SUGAR DIAGNOSTIC STRP    Use to check blood sugar 3 times daily    BLOOD-GLUCOSE METER (ONETOUCH VERIO FLEX METER) MISC    Use as directed to check blood glucose    CELECOXIB (CELEBREX) 200 MG CAPSULE    Take 1 capsule (200 mg total) by mouth once daily.    CLOTRIMAZOLE (LOTRIMIN) 1 % CREAM        CLOTRIMAZOLE-BETAMETHASONE 1-0.05% (LOTRISONE) CREAM    APPLY TO AFFECTED AREA TWICE A DAY    LYSINE ORAL    Take by mouth Daily.    METHOCARBAMOL (ROBAXIN) 750 MG TAB    Take 1 tablet (750  mg total) by mouth every 8 (eight) hours as needed (muscle spasm/tightness).    OLMESARTAN (BENICAR) 20 MG TABLET    Take 0.5 tablets (10 mg total) by mouth once daily.    OMEPRAZOLE (PRILOSEC) 20 MG CAPSULE    Take 1 capsule (20 mg total) by mouth once daily.    OMEPRAZOLE (PRILOSEC) 20 MG CAPSULE    Take 1 capsule (20 mg total) by mouth once daily.    ONETOUCH DELICA PLUS LANCET 33 GAUGE MISC    USE TO TEST BLOOD SUGAR DAILY    ONETOUCH ULTRA BLUE TEST STRIP STRP    TEST DAILY    TIRZEPATIDE (MOUNJARO) 5 MG/0.5 ML PNIJ    Inject 5 mg into the skin every 7 days.    VALACYCLOVIR (VALTREX) 1000 MG TABLET    Take 1 tablet by mouth daily; for a week as needed for flares